# Patient Record
Sex: FEMALE | Race: WHITE | Employment: UNEMPLOYED | URBAN - METROPOLITAN AREA
[De-identification: names, ages, dates, MRNs, and addresses within clinical notes are randomized per-mention and may not be internally consistent; named-entity substitution may affect disease eponyms.]

---

## 2017-02-10 ENCOUNTER — OFFICE VISIT (OUTPATIENT)
Dept: INTERNAL MEDICINE CLINIC | Age: 37
End: 2017-02-10

## 2017-02-10 VITALS
HEART RATE: 97 BPM | BODY MASS INDEX: 39.87 KG/M2 | RESPIRATION RATE: 16 BRPM | HEIGHT: 63 IN | SYSTOLIC BLOOD PRESSURE: 118 MMHG | TEMPERATURE: 97.9 F | WEIGHT: 225 LBS | OXYGEN SATURATION: 98 % | DIASTOLIC BLOOD PRESSURE: 79 MMHG

## 2017-02-10 DIAGNOSIS — K21.9 GASTROESOPHAGEAL REFLUX DISEASE WITHOUT ESOPHAGITIS: ICD-10-CM

## 2017-02-10 DIAGNOSIS — E10.8 TYPE 1 DIABETES MELLITUS WITH COMPLICATIONS (HCC): ICD-10-CM

## 2017-02-10 DIAGNOSIS — J30.9 ALLERGIC RHINITIS, UNSPECIFIED ALLERGIC RHINITIS TRIGGER, UNSPECIFIED RHINITIS SEASONALITY: ICD-10-CM

## 2017-02-10 DIAGNOSIS — J06.9 URTI (ACUTE UPPER RESPIRATORY INFECTION): Primary | ICD-10-CM

## 2017-02-10 RX ORDER — INSULIN DEGLUDEC 200 U/ML
INJECTION, SOLUTION SUBCUTANEOUS
COMMUNITY
End: 2017-06-26 | Stop reason: ALTCHOICE

## 2017-02-10 RX ORDER — FLUTICASONE PROPIONATE 50 MCG
2 SPRAY, SUSPENSION (ML) NASAL DAILY
Qty: 1 BOTTLE | Refills: 0 | Status: SHIPPED | OUTPATIENT
Start: 2017-02-10 | End: 2018-04-10

## 2017-02-10 RX ORDER — AMOXICILLIN 875 MG/1
875 TABLET, FILM COATED ORAL 2 TIMES DAILY
Qty: 20 TAB | Refills: 0 | Status: SHIPPED | OUTPATIENT
Start: 2017-02-10 | End: 2017-06-26 | Stop reason: ALTCHOICE

## 2017-02-10 RX ORDER — PANTOPRAZOLE SODIUM 40 MG/1
TABLET, DELAYED RELEASE ORAL
Qty: 30 TAB | Refills: 5 | Status: SHIPPED | OUTPATIENT
Start: 2017-02-10 | End: 2017-11-30 | Stop reason: SDUPTHER

## 2017-02-10 NOTE — PROGRESS NOTES
Chief Complaint   Patient presents with    Ear Pain    Sinus Pain     Reviewed record  In preparation for visit and have obtained necessary documentation. 1. Have you been to the ER, urgent care clinic since your last visit? Hospitalized since your last visit? No    2. Have you seen or consulted any other health care providers outside of the [facility] since your last visit? Include any pap smears or colon screening. Dr. Renata Couch.     Used 2 patient I. D. 's

## 2017-02-10 NOTE — PROGRESS NOTES
HISTORY OF PRESENT ILLNESS  Gumaro Wilkins is a 43 y.o. female. This patient presents today with complaints of ear pain. The patient has been experiencing right ear pain x 1 week. She has had some sinus pressure and nasal congestion as well as cough. No fever or chills. No chest pain or shortness of breath. Patient's history includes Type I DM, followed by Dr. Jaden Zhang. She states she saw him last month with A1c 6.9. Patient' history also includes GERD. She requests refill of Protonix. Visit Vitals    /79    Pulse 97    Temp 97.9 °F (36.6 °C)    Resp 16    Ht 5' 3\" (1.6 m)    Wt 225 lb (102.1 kg)    SpO2 98%    Breastfeeding Yes    BMI 39.86 kg/m2     HPI    Review of Systems   Constitutional: Negative for chills, fever and malaise/fatigue. HENT: Positive for congestion, ear pain and sore throat. Eyes: Negative for blurred vision. Respiratory: Positive for cough. Negative for shortness of breath and wheezing. Cardiovascular: Negative for chest pain and leg swelling. Musculoskeletal: Negative. Skin: Negative. Neurological: Positive for headaches. Negative for dizziness, tingling and tremors. Endo/Heme/Allergies: Negative. Psychiatric/Behavioral: Negative. Physical Exam   Constitutional: She is oriented to person, place, and time. She appears well-developed and well-nourished. No distress. HENT:   Head: Normocephalic and atraumatic. Right Ear: External ear normal.   Left Ear: External ear normal.   Mouth/Throat: No oropharyngeal exudate. Moderate nasal congestion  Frontal sinus tenderness   Eyes: Conjunctivae are normal.   Neck: Neck supple. Cardiovascular: Normal rate and regular rhythm. No murmur heard. Pulmonary/Chest: Effort normal and breath sounds normal. No respiratory distress. She has no wheezes. She has no rales. Abdominal: Soft. She exhibits no distension. Musculoskeletal: She exhibits no edema.    Lymphadenopathy:     She has no cervical adenopathy. Neurological: She is alert and oriented to person, place, and time. Skin: Skin is warm and dry. Psychiatric: She has a normal mood and affect. Her behavior is normal.       ASSESSMENT and PLAN    ICD-10-CM ICD-9-CM    1. URTI (acute upper respiratory infection) J06.9 465.9 Will order  amoxicillin (AMOXIL) 875 mg tablet, 1 tab po bid x 10 days. Patient encouraged to rest and drink plenty of fluids. 2. Allergic rhinitis, unspecified allergic rhinitis trigger, unspecified rhinitis seasonality J30.9 477.9 Will refill:  fluticasone (FLONASE) 50 mcg/actuation nasal spray, 2 sprays to each nostril daily PRN   3. 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 Brittney Joyner   4. Gastroesophageal reflux disease without esophagitis K21.9 530.81 Will refill:  pantoprazole (PROTONIX) 40 mg tablet, 1 tab po daily. Lab results and schedule of future lab studies reviewed with patient  Reviewed diet, exercise and weight control  Reviewed medications and side effects in detail  Patient encouraged to call or return to office if symptoms do not improve or worsen. Reviewed plan of care with patient who acknowledges understanding and agrees.

## 2017-02-10 NOTE — MR AVS SNAPSHOT
Visit Information Date & Time Provider Department Dept. Phone Encounter #  
 2/10/2017  9:45 AM Stiven Zelaya, [address] [facility] Internal Medicine [phone] [id number] Upcoming Health Maintenance Date Due  
 LIPID PANEL Q1 1978 FOOT EXAM Q1 9/29/1988 EYE EXAM RETINAL OR DILATED Q1 9/29/1988 Pneumococcal 19-64 Medium Risk (1 of 1 - PPSV23) 9/29/1997 DTaP/Tdap/Td series (1 - Tdap) 9/29/1999 PAP AKA CERVICAL CYTOLOGY 9/29/1999 HEMOGLOBIN A1C Q6M 5/26/2012 MICROALBUMIN Q1 10/9/2014 Allergies as of 2/10/2017  Review Complete On: 2/10/2017 By: Stiven Zelaya NP Severity Noted Reaction Type Reactions Aspirin  12/12/2010    Itching Orange  11/26/2011    Swelling Sulfa (Sulfonamide Antibiotics)  12/12/2010    Nausea and Vomiting Sudafed [Pseudoephedrine Hcl] Low 11/27/2011   Intolerance Other (comments) Severe head ache Current Immunizations  Never Reviewed No immunizations on file. Not reviewed this visit Vitals BP Pulse Temp Resp Height(growth percentile) Weight(growth percentile) 118/79 97 97.9 °F (36.6 °C) 16 5' 3\" (1.6 m) 225 lb (102.1 kg) LMP SpO2 Breastfeeding? BMI OB Status Smoking Status 01/30/2017 (Approximate) 98% Yes 39.86 kg/m2 Having regular periods Never Smoker Vitals History BMI and BSA Data Body Mass Index Body Surface Area  
 39.86 kg/m 2 2.13 m 2 Preferred Pharmacy Pharmacy Name Phone [facility] [address], [address] [phone] Your Updated Medication List  
  
   
This list is accurate as of: 2/10/17 10:30 AM.  Always use your most recent med list.  
  
  
  
  
 albuterol 90 mcg/actuation inhaler Commonly known as:  PROVENTIL HFA, VENTOLIN HFA, PROAIR HFA Take 2 puffs by inhalation every four (4) hours as needed for Wheezing. amoxicillin 875 mg tablet Commonly known as:  AMOXIL Take 1 Tab by mouth two (2) times a day. fluticasone 50 mcg/actuation nasal spray Commonly known as:  Dante Metro 2 Sprays by Both Nostrils route daily. HUMALOG PEN SC  
by SubCUTAneous route. pantoprazole 40 mg tablet Commonly known as:  PROTONIX  
TAKE ONE TABLET BY MOUTH EVERY DAY PRENATAL MULTIVIT WITH IRON PO Take  by mouth. TRESIBA FLEXTOUCH U-200 200 unit/mL (3 mL) Inpn Generic drug:  insulin degludec  
by SubCUTAneous route. Prescriptions Sent to Pharmacy Refills  
 fluticasone (FLONASE) 50 mcg/actuation nasal spray 0 Si Sprays by Both Nostrils route daily. Class: Normal  
 Pharmacy: [address] Ph #: [phone] Route: Both Nostrils  
 amoxicillin (AMOXIL) 875 mg tablet 0 Sig: Take 1 Tab by mouth two (2) times a day. Class: Normal  
 Pharmacy: [address] Ph #: [phone] Route: Oral  
 pantoprazole (PROTONIX) 40 mg tablet 5 Sig: TAKE ONE TABLET BY MOUTH EVERY DAY Class: Normal  
 Pharmacy: [address] Ph #: [phone] Please provide this summary of care documentation to your next provider. Your primary care clinician is listed as Yenni Winchester. If you have any questions after today's visit, please call [phone].

## 2017-03-10 RX ORDER — ALBUTEROL SULFATE 90 UG/1
2 AEROSOL, METERED RESPIRATORY (INHALATION)
Qty: 1 INHALER | Refills: 1 | Status: SHIPPED | OUTPATIENT
Start: 2017-03-10 | End: 2018-04-10 | Stop reason: SDUPTHER

## 2017-06-26 ENCOUNTER — OFFICE VISIT (OUTPATIENT)
Dept: INTERNAL MEDICINE CLINIC | Age: 37
End: 2017-06-26

## 2017-06-26 VITALS
OXYGEN SATURATION: 97 % | WEIGHT: 230 LBS | RESPIRATION RATE: 14 BRPM | DIASTOLIC BLOOD PRESSURE: 81 MMHG | TEMPERATURE: 96.7 F | HEIGHT: 62 IN | SYSTOLIC BLOOD PRESSURE: 134 MMHG | HEART RATE: 98 BPM | BODY MASS INDEX: 42.33 KG/M2

## 2017-06-26 DIAGNOSIS — J02.9 PHARYNGITIS, UNSPECIFIED ETIOLOGY: ICD-10-CM

## 2017-06-26 DIAGNOSIS — E10.8 TYPE 1 DIABETES MELLITUS WITH COMPLICATIONS (HCC): ICD-10-CM

## 2017-06-26 DIAGNOSIS — J01.00 ACUTE MAXILLARY SINUSITIS, RECURRENCE NOT SPECIFIED: Primary | ICD-10-CM

## 2017-06-26 RX ORDER — AMOXICILLIN AND CLAVULANATE POTASSIUM 875; 125 MG/1; MG/1
1 TABLET, FILM COATED ORAL EVERY 12 HOURS
Qty: 20 TAB | Refills: 0 | Status: SHIPPED | OUTPATIENT
Start: 2017-06-26 | End: 2017-10-06 | Stop reason: ALTCHOICE

## 2017-06-26 RX ORDER — INSULIN GLARGINE 100 [IU]/ML
41 INJECTION, SOLUTION SUBCUTANEOUS
COMMUNITY
End: 2017-10-06

## 2017-06-26 NOTE — PROGRESS NOTES
Subjective:     Chief Complaint   Patient presents with    Sore Throat       History of Present Illness    Katherine Schwartz is a 43y.o. year old female who presents for evaluation of sore throat and sinus pain. Pt states her symptoms began 1 week ago. She is also complaining of a dry cough. She states she has friends with children who have been recently diagnosed with strep throat. She denies any fever, SOB or chest pain. She has a hx of diabetes and states that her blood sugar has been slightly elevated since she started not feeling well. Pt states she is compliant with her medication. Current Outpatient Prescriptions on File Prior to Visit   Medication Sig Dispense Refill    albuterol (PROVENTIL HFA, VENTOLIN HFA, PROAIR HFA) 90 mcg/actuation inhaler Take 2 Puffs by inhalation every four (4) hours as needed for Wheezing. 1 Inhaler 1    pantoprazole (PROTONIX) 40 mg tablet TAKE ONE TABLET BY MOUTH EVERY DAY 30 Tab 5    INSULIN LISPRO (HUMALOG PEN SC) by SubCUTAneous route.  PNV #29/[id number] FUM/FOLIC ACID (PRENATAL MULTIVIT WITH IRON PO) Take  by mouth.  fluticasone (FLONASE) 50 mcg/actuation nasal spray 2 Sprays by Both Nostrils route daily. 1 Bottle 0     No current facility-administered medications on file prior to visit.         Allergies   Allergen Reactions    Aspirin Itching    Orange Swelling    Sulfa (Sulfonamide Antibiotics) Nausea and Vomiting    Sudafed [Pseudoephedrine Hcl] Other (comments)     Severe head ache      Past Medical History:   Diagnosis Date    Diabetes ([address]     Gastrointestinal disorder     GERD    GERD (gastroesophageal reflux disease)     Thyroid disease       Past Surgical History:   Procedure Laterality Date    HX  SECTION      HX CHOLECYSTECTOMY      HX HEENT      eye surgery    HX LIPOMA RESECTION      HX ORTHOPAEDIC      Carpul jesus manuel    HX OTHER SURGICAL      lipoma removal      Social History   Substance Use Topics    Smoking status: Never Smoker    Smokeless tobacco: Never Used    Alcohol use No      Family History   Problem Relation Age of Onset    Thyroid Disease Mother     Heart Disease Father     Cancer Father 61     Colon cancer    Alcohol abuse Father     Cancer Paternal Aunt      Breast cancer    Arthritis-rheumatoid Paternal Aunt     Cancer Paternal Aunt      breast cancer        Objective:     Vitals:    06/26/17 1355   BP: 134/81   Pulse: 98   Resp: 14   Temp: 96.7 °F (35.9 °C)   TempSrc: Oral   SpO2: 97%   Weight: 230 lb (104.3 kg)   Height: 5\"2'       Review of Systems   Constitutional: Negative. HENT:        Sore throat and facial pain   Eyes: Negative. Respiratory: Negative. Cardiovascular: Negative. Gastrointestinal: Negative. Genitourinary: Negative. Musculoskeletal: Negative. Skin: Negative. Neurological: Negative. Psychiatric/Behavioral: Negative. Physical Exam   Constitutional: She is oriented to person, place, and time. She appears well-developed and well-nourished. No distress. HENT:   Head: Normocephalic and atraumatic. Right Ear: Tympanic membrane normal.   Left Ear: Tympanic membrane normal.   Nose: Mucosal edema present. Right sinus exhibits maxillary sinus tenderness. Right sinus exhibits no frontal sinus tenderness. Left sinus exhibits maxillary sinus tenderness. Left sinus exhibits no frontal sinus tenderness. Mouth/Throat: Uvula is midline. Posterior oropharyngeal erythema present. Eyes: Pupils are equal, round, and reactive to light. Neck: Normal range of motion. Neck supple. Cardiovascular: Normal rate, regular rhythm and normal heart sounds. Pulmonary/Chest: Effort normal and breath sounds normal. No respiratory distress. Abdominal: She exhibits no distension. Musculoskeletal: Normal range of motion. She exhibits no edema. Neurological: She is alert and oriented to person, place, and time. Skin: Skin is warm. She is not diaphoretic. Psychiatric: She has a normal mood and affect. Her behavior is normal.   Nursing note and vitals reviewed. Assessment/ Plan:   Yandel Wall was seen today for sore throat. Diagnoses and all orders for this visit:    Acute maxillary sinusitis, recurrence not specified   Will order  -     amoxicillin-clavulanate (AUGMENTIN) 875-125 mg per tablet; Take 1 Tab by mouth every twelve (12) hours. 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 Patient is followed by endocrinology, Dr. Ulysses Grice. Dr. Ulysses Grice recently switched patient from [place] back to Lantus 41 units q HS. Reviewed plan of care with patient. Patient verbalizes understanding. Patient without questions or concerns at this time. Patient encouraged to call or return to office if symptoms do not improve or worsen.

## 2017-06-26 NOTE — MR AVS SNAPSHOT
Visit Information Date & Time Provider Department Dept. Phone Encounter #  
 6/26/2017  1:45 PM Gabriel Pleitez, [address] [facility] Internal Medicine [phone] [id number] Follow-up Instructions Return if symptoms worsen or fail to improve. Upcoming Health Maintenance Date Due  
 LIPID PANEL Q1 1978 FOOT EXAM Q1 9/29/1988 EYE EXAM RETINAL OR DILATED Q1 9/29/1988 Pneumococcal 19-64 Medium Risk (1 of 1 - PPSV23) 9/29/1997 DTaP/Tdap/Td series (1 - Tdap) 9/29/1999 PAP AKA CERVICAL CYTOLOGY 9/29/1999 HEMOGLOBIN A1C Q6M 5/26/2012 MICROALBUMIN Q1 10/9/2014 INFLUENZA AGE 9 TO ADULT 8/1/2017 Allergies as of 6/26/2017  Review Complete On: 6/26/2017 By: Gabriel Pleitez NP Severity Noted Reaction Type Reactions Aspirin  12/12/2010    Itching Orange  11/26/2011    Swelling Sulfa (Sulfonamide Antibiotics)  12/12/2010    Nausea and Vomiting Sudafed [Pseudoephedrine Hcl] Low 11/27/2011   Intolerance Other (comments) Severe head ache Current Immunizations  Never Reviewed No immunizations on file. Not reviewed this visit You Were Diagnosed With   
  
 Codes Comments Acute maxillary sinusitis, recurrence not specified    -  Primary ICD-10-CM: J01.00 ICD-9-CM: 461.0 Vitals BP Pulse Temp Resp Height(growth percentile) Weight(growth percentile) 134/81 (BP 1 Location: Right arm, BP Patient Position: Sitting) 98 96.7 °F (35.9 °C) (Oral) 14 (!) 5.2\" (0.132 m) 230 lb (104.3 kg) SpO2 BMI OB Status Smoking Status 97% 5,980.31 kg/m2 Having regular periods Never Smoker BMI and BSA Data Body Mass Index Body Surface Area  
 5,980.31 kg/m 2 0.62 m 2 Preferred Pharmacy Pharmacy Name Phone [facility] [phone] Your Updated Medication List  
  
   
 This list is accurate as of: 6/26/17  2:04 PM.  Always use your most recent med list.  
  
  
  
  
 albuterol 90 mcg/actuation inhaler Commonly known as:  PROVENTIL HFA, VENTOLIN HFA, PROAIR HFA Take 2 Puffs by inhalation every four (4) hours as needed for Wheezing. amoxicillin-clavulanate 875-125 mg per tablet Commonly known as:  AUGMENTIN Take 1 Tab by mouth every twelve (12) hours. fluticasone 50 mcg/actuation nasal spray Commonly known as:  Rosalie Smack 2 Sprays by Both Nostrils route daily. HUMALOG PEN SC  
by SubCUTAneous route. LANTUS 100 unit/mL injection Generic drug:  insulin glargine 41 Units by SubCUTAneous route nightly. pantoprazole 40 mg tablet Commonly known as:  PROTONIX  
TAKE ONE TABLET BY MOUTH EVERY DAY PRENATAL MULTIVIT WITH IRON PO Take  by mouth. Prescriptions Sent to Pharmacy Refills  
 amoxicillin-clavulanate (AUGMENTIN) 875-125 mg per tablet 0 Sig: Take 1 Tab by mouth every twelve (12) hours. Class: Normal  
 [facility] [address] [facility] [phone] Route: Oral  
  
Follow-up Instructions Return if symptoms worsen or fail to improve. Please provide this summary of care documentation to your next provider. Your primary care clinician is listed as Ayo Scott. If you have any questions after today's visit, please call [phone].

## 2017-06-26 NOTE — PATIENT INSTRUCTIONS
Sinusitis: Care Instructions  Your Care Instructions    Sinusitis is an infection of the lining of the sinus cavities in your head. Sinusitis often follows a cold. It causes pain and pressure in your head and face. In most cases, sinusitis gets better on its own in 1 to 2 weeks. But some mild symptoms may last for several weeks. Sometimes antibiotics are needed. Follow-up care is a key part of your treatment and safety. Be sure to make and go to all appointments, and call your doctor if you are having problems. It's also a good idea to know your test results and keep a list of the medicines you take. How can you care for yourself at home? · Take an over-the-counter pain medicine, such as acetaminophen (Tylenol), ibuprofen (Advil, Motrin), or naproxen (Aleve). Read and follow all instructions on the label. · If the doctor prescribed antibiotics, take them as directed. Do not stop taking them just because you feel better. You need to take the full course of antibiotics. · Be careful when taking over-the-counter cold or flu medicines and Tylenol at the same time. Many of these medicines have acetaminophen, which is Tylenol. Read the labels to make sure that you are not taking more than the recommended dose. Too much acetaminophen (Tylenol) can be harmful. · Breathe warm, moist air from a steamy shower, a hot bath, or a sink filled with hot water. Avoid cold, dry air. Using a humidifier in your home may help. Follow the directions for cleaning the machine. · Use saline (saltwater) nasal washes to help keep your nasal passages open and wash out mucus and bacteria. You can buy saline nose drops at a grocery store or drugstore. Or you can make your own at home by adding 1 teaspoon of salt and 1 teaspoon of baking soda to 2 cups of distilled water. If you make your own, fill a bulb syringe with the solution, insert the tip into your nostril, and squeeze gently. Valerio Gaw your nose.   · Put a hot, wet towel or a warm gel pack on your face 3 or 4 times a day for 5 to 10 minutes each time. · Try a decongestant nasal spray like oxymetazoline (Afrin). Do not use it for more than 3 days in a row. Using it for more than 3 days can make your congestion worse. When should you call for help? Call your doctor now or seek immediate medical care if:  · You have new or worse swelling or redness in your face or around your eyes. · You have a new or higher fever. Watch closely for changes in your health, and be sure to contact your doctor if:  · You have new or worse facial pain. · The mucus from your nose becomes thicker (like pus) or has new blood in it. · You are not getting better as expected. Where can you learn more? Go to http://shira-obed.info/. Enter R052 in the search box to learn more about \"Sinusitis: Care Instructions. \"  Current as of: July 29, 2016  Content Version: 11.3  © 2241-6353 [employer]. Care instructions adapted under license by [employer] (which disclaims liability or warranty for this information). If you have questions about a medical condition or this instruction, always ask your healthcare professional. [place] [address] any warranty or liability for your use of this information. Sore Throat: Care Instructions  Your Care Instructions    Infection by bacteria or a virus causes most sore throats. Cigarette smoke, dry air, air pollution, allergies, and yelling can also cause a sore throat. Sore throats can be painful and annoying. Fortunately, most sore throats go away on their own. If you have a bacterial infection, your doctor may prescribe antibiotics. Follow-up care is a key part of your treatment and safety. Be sure to make and go to all appointments, and call your doctor if you are having problems. It's also a good idea to know your test results and keep a list of the medicines you take. How can you care for yourself at home?   · If your doctor prescribed antibiotics, take them as directed. Do not stop taking them just because you feel better. You need to take the full course of antibiotics. · Gargle with warm salt water once an hour to help reduce swelling and relieve discomfort. Use 1 teaspoon of salt mixed in 1 cup of warm water. · Take an over-the-counter pain medicine, such as acetaminophen (Tylenol), ibuprofen (Advil, Motrin), or naproxen (Aleve). Read and follow all instructions on the label. · Be careful when taking over-the-counter cold or flu medicines and Tylenol at the same time. Many of these medicines have acetaminophen, which is Tylenol. Read the labels to make sure that you are not taking more than the recommended dose. Too much acetaminophen (Tylenol) can be harmful. · Drink plenty of fluids. Fluids may help soothe an irritated throat. Hot fluids, such as tea or soup, may help decrease throat pain. · Use over-the-counter throat lozenges to soothe pain. Regular cough drops or hard candy may also help. These should not be given to young children because of the risk of choking. · Do not smoke or allow others to smoke around you. If you need help quitting, talk to your doctor about stop-smoking programs and medicines. These can increase your chances of quitting for good. · Use a vaporizer or humidifier to add moisture to your bedroom. Follow the directions for cleaning the machine. When should you call for help? Call your doctor now or seek immediate medical care if:  · You have new or worse trouble swallowing. · Your sore throat gets much worse on one side. Watch closely for changes in your health, and be sure to contact your doctor if you do not get better as expected. Where can you learn more? Go to http://shira-obed.info/. Enter 062 441 [id number] [phone] in the search box to learn more about \"Sore Throat: Care Instructions. \"  Current as of: July 29, 2016  Content Version: 11.3  © 5751-1479 [employer]. Care instructions adapted under license by [employer] (which disclaims liability or warranty for this information). If you have questions about a medical condition or this instruction, always ask your healthcare professional. [address] any warranty or liability for your use of this information.

## 2017-10-06 ENCOUNTER — OFFICE VISIT (OUTPATIENT)
Dept: INTERNAL MEDICINE CLINIC | Age: 37
End: 2017-10-06

## 2017-10-06 VITALS
OXYGEN SATURATION: 95 % | BODY MASS INDEX: 41.41 KG/M2 | HEIGHT: 62 IN | SYSTOLIC BLOOD PRESSURE: 110 MMHG | HEART RATE: 88 BPM | WEIGHT: 225 LBS | TEMPERATURE: 98.2 F | DIASTOLIC BLOOD PRESSURE: 70 MMHG | RESPIRATION RATE: 18 BRPM

## 2017-10-06 DIAGNOSIS — L25.9 CONTACT DERMATITIS, UNSPECIFIED CONTACT DERMATITIS TYPE, UNSPECIFIED TRIGGER: Primary | ICD-10-CM

## 2017-10-06 DIAGNOSIS — E10.8 TYPE 1 DIABETES MELLITUS WITH COMPLICATIONS (HCC): ICD-10-CM

## 2017-10-06 DIAGNOSIS — M72.2 PLANTAR FASCIITIS OF RIGHT FOOT: ICD-10-CM

## 2017-10-06 DIAGNOSIS — E66.9 OBESITY (BMI 30-39.9): ICD-10-CM

## 2017-10-06 RX ORDER — TRIAMCINOLONE ACETONIDE 5 MG/G
OINTMENT TOPICAL 2 TIMES DAILY
Qty: 30 G | Refills: 0 | Status: SHIPPED | OUTPATIENT
Start: 2017-10-06 | End: 2018-06-27

## 2017-10-06 RX ORDER — IBUPROFEN 600 MG/1
600 TABLET ORAL
Qty: 30 TAB | Refills: 0 | Status: SHIPPED | OUTPATIENT
Start: 2017-10-06 | End: 2022-07-28

## 2017-10-06 RX ORDER — BLOOD SUGAR DIAGNOSTIC
STRIP MISCELLANEOUS
Refills: 4 | COMMUNITY
Start: 2017-10-02

## 2017-10-06 RX ORDER — INSULIN GLARGINE 300 U/ML
46 INJECTION, SOLUTION SUBCUTANEOUS
COMMUNITY
Start: 2017-10-05

## 2017-10-06 RX ORDER — INSULIN LISPRO 100 [IU]/ML
INJECTION, SOLUTION INTRAVENOUS; SUBCUTANEOUS
Refills: 1 | COMMUNITY
Start: 2017-10-02 | End: 2018-04-10

## 2017-10-06 NOTE — MR AVS SNAPSHOT
Visit Information Date & Time Provider Department Dept. Phone Encounter #  
 10/6/2017 10:40 AM Yu Rice NP [facility] Internal Medicine [phone] [id number] Upcoming Health Maintenance Date Due  
 LIPID PANEL Q1 1978 FOOT EXAM Q1 9/29/1988 EYE EXAM RETINAL OR DILATED Q1 9/29/1988 Pneumococcal 19-64 Medium Risk (1 of 1 - PPSV23) 9/29/1997 DTaP/Tdap/Td series (1 - Tdap) 9/29/1999 PAP AKA CERVICAL CYTOLOGY 9/29/1999 HEMOGLOBIN A1C Q6M 5/26/2012 MICROALBUMIN Q1 10/9/2014 INFLUENZA AGE 9 TO ADULT 8/1/2017 Allergies as of 10/6/2017  Review Complete On: 10/6/2017 By: Yu Rice NP Severity Noted Reaction Type Reactions Aspirin  12/12/2010    Itching Orange  11/26/2011    Swelling Sulfa (Sulfonamide Antibiotics)  12/12/2010    Nausea and Vomiting Sudafed [Pseudoephedrine Hcl] Low 11/27/2011   Intolerance Other (comments) Severe head ache Current Immunizations  Never Reviewed No immunizations on file. Not reviewed this visit You Were Diagnosed With   
  
 Codes Comments Contact dermatitis, unspecified contact dermatitis type, unspecified trigger    -  Primary ICD-10-CM: L25.9 ICD-9-CM: 692.9 Vitals BP Pulse Temp Resp Height(growth percentile) Weight(growth percentile) 110/70 (BP 1 Location: Left arm, BP Patient Position: Sitting) 88 98.2 °F (36.8 °C) (Oral) 18 5' 2\" (1.575 m) 225 lb (102.1 kg) LMP SpO2 BMI OB Status Smoking Status 09/23/2017 (Exact Date) 95% 41.15 kg/m2 Having regular periods Never Smoker Vitals History BMI and BSA Data Body Mass Index Body Surface Area  
 41.15 kg/m 2 2.11 m 2 Preferred Pharmacy Pharmacy Name Phone [phone] Your Updated Medication List  
  
   
This list is accurate as of: 10/6/17 11:17 AM.  Always use your most recent med list.  
  
  
  
  
 albuterol 90 mcg/actuation inhaler Commonly known as:  PROVENTIL HFA, VENTOLIN HFA, PROAIR HFA Take 2 Puffs by inhalation every four (4) hours as needed for Wheezing. fluticasone 50 mcg/actuation nasal spray Commonly known as:  Jeannette Guild 2 Sprays by Both Nostrils route daily. * HUMALOG PEN SC  
by SubCUTAneous route. * HumaLOG KwikPen 100 unit/mL kwikpen Generic drug:  insulin lispro INJECT 30 UNITS UNDER THE SKIN TID AT MEALS AS INSTRUCTED NOVOFINE 30 30 gauge x 1/3\" Generic drug:  Insulin Needles (Disposable) USE TO INJECT INSULIN TID ONETOUCH VERIO strip Generic drug:  glucose blood VI test strips U TO TEST BLOOD SUGAR 8 TIMES D  
  
 pantoprazole 40 mg tablet Commonly known as:  PROTONIX  
TAKE ONE TABLET BY MOUTH EVERY DAY PRENATAL MULTIVIT WITH IRON PO Take  by mouth. TOUJEO SOLOSTAR 300 unit/mL (1.5 mL) Inpn Generic drug:  insulin glargine  
  
 triamcinolone acetonide 0.5 % ointment Commonly known as:  KENALOG Apply  to affected area two (2) times a day. use thin layer * Notice: This list has 2 medication(s) that are the same as other medications prescribed for you. Read the directions carefully, and ask your doctor or other care provider to review them with you. Prescriptions Sent to Pharmacy Refills  
 triamcinolone acetonide (KENALOG) 0.5 % ointment 0 Sig: Apply  to affected area two (2) times a day. use thin layer Class: Normal  
 Pharmacy: [employer] [facility] [address]  #: [phone] Route: Topical  
  
 Please provide this summary of care documentation to your next provider. Your primary care clinician is listed as Cadence Ortiz. If you have any questions after today's visit, please call [phone].

## 2017-10-06 NOTE — PROGRESS NOTES
Health Maintenance Due   Topic Date Due    LIPID PANEL Q1  1978    FOOT EXAM Q1  09/29/1988    EYE EXAM RETINAL OR DILATED Q1  09/29/1988    Pneumococcal 19-64 Medium Risk (1 of 1 - PPSV23) 09/29/1997    DTaP/Tdap/Td series (1 - Tdap) 09/29/1999    PAP AKA CERVICAL CYTOLOGY  09/29/1999    HEMOGLOBIN A1C Q6M  05/26/2012    MICROALBUMIN Q1  10/09/2014    INFLUENZA AGE 9 TO ADULT  08/01/2017       No chief complaint on file. 1. Have you been to the ER, urgent care clinic since your last visit? Hospitalized since your last visit? Yes When: 8/19/17 Where: [facility] Reason for visit: [place]    2. Have you seen or consulted any other health care providers outside of the [facility] since your last visit? Include any pap smears or colon screening. Yes When: 10/4/17 Where: Dr. Deb Alba Reason for visit: Endocrinologist    3) Do you have an Advance Directive on file? no    4) Are you interested in receiving information on Advance Directives? NO      Patient is accompanied by self I have received verbal consent from [facility] to discuss any/all medical information while they are present in the room.

## 2017-10-06 NOTE — PROGRESS NOTES
Subjective:     Chief Complaint   Patient presents with    Rash     Red area on neck    Foot Pain     when moves    Diabetes       History of Present Illness    Nery Al is a 42y.o. year old female who is a patient of Dr. Casa Hoffmann that presents today with complaints of a small irritated area to her neck. She states it has been there for 1 month. She states it is pruritic. She has used OTC antibiotic cream and antifungal cream with no results. She also reports pain to to the bottom of her right foot that causes swelling to the top of her foot. Onset 1 week. She reports taking motrin with moderate relief. She has also applied ice to the affected area. She is ambulatory with a steady gait. She denies any other complaints today. She states her BS has been inconsistent. She reports recently changing from [facility] to [facility]. She is also breast feeding. Her other hx includes thyroid disease and GERD. No CP, SOB, GI, or  symptoms. Reviewed medications, recent lab work and imaging with patient. Pt reports compliance with medications. Current Outpatient Prescriptions on File Prior to Visit   Medication Sig Dispense Refill    albuterol (PROVENTIL HFA, VENTOLIN HFA, PROAIR HFA) 90 mcg/actuation inhaler Take 2 Puffs by inhalation every four (4) hours as needed for Wheezing. 1 Inhaler 1    fluticasone (FLONASE) 50 mcg/actuation nasal spray 2 Sprays by Both Nostrils route daily. 1 Bottle 0    pantoprazole (PROTONIX) 40 mg tablet TAKE ONE TABLET BY MOUTH EVERY DAY 30 Tab 5    INSULIN LISPRO (HUMALOG PEN SC) by SubCUTAneous route.  PNV #71/[id number] FUM/FOLIC ACID (PRENATAL MULTIVIT WITH IRON PO) Take  by mouth.  insulin glargine (LANTUS) 100 unit/mL injection 41 Units by SubCUTAneous route nightly. No current facility-administered medications on file prior to visit.         Allergies   Allergen Reactions    Aspirin Itching    Orange Swelling    Sulfa (Sulfonamide Antibiotics) Nausea and Vomiting    Sudafed [Pseudoephedrine Hcl] Other (comments)     Severe head ache      Past Medical History:   Diagnosis Date    Diabetes ([address]     Gastrointestinal disorder     GERD    GERD (gastroesophageal reflux disease)     Thyroid disease       Past Surgical History:   Procedure Laterality Date    HX  SECTION      HX CHOLECYSTECTOMY  2013    HX HEENT      eye surgery    HX LIPOMA RESECTION      HX ORTHOPAEDIC      Carpul jesus manuel    HX OTHER SURGICAL      lipoma removal      Social History   Substance Use Topics    Smoking status: Never Smoker    Smokeless tobacco: Never Used    Alcohol use No      Family History   Problem Relation Age of Onset    Thyroid Disease Mother     Heart Disease Father     Cancer Father 61     Colon cancer    Alcohol abuse Father     Cancer Paternal Aunt      Breast cancer    Arthritis-rheumatoid Paternal Aunt     Cancer Paternal Aunt      breast cancer        Objective:     Vitals:    10/06/17 1049   BP: 110/70   Pulse: 88   Resp: 18   Temp: 98.2 °F (36.8 °C)   TempSrc: Oral   SpO2: 95%   Weight: 225 lb (102.1 kg)   Height: 5' 2\" (1.575 m)       Review of Systems   Constitutional: Negative. HENT: Negative. Eyes: Negative. Respiratory: Negative. Cardiovascular: Negative. Gastrointestinal: Negative. Genitourinary: Negative. Musculoskeletal: Positive for myalgias. Skin: Positive for itching and rash. Psychiatric/Behavioral: Negative. Physical Exam   Constitutional: She is oriented to person, place, and time. She appears well-developed and well-nourished. No distress. Well-appearing obese  female. NAD    HENT:   Head: Normocephalic and atraumatic. Eyes: Conjunctivae and EOM are normal. Pupils are equal, round, and reactive to light. Neck: Normal range of motion. Neck supple. Cardiovascular: Normal rate, regular rhythm and normal heart sounds.     Pulmonary/Chest: Effort normal and breath sounds normal. No respiratory distress. Abdominal: Soft. Bowel sounds are normal. She exhibits no distension. There is no tenderness. Musculoskeletal: Normal range of motion. She exhibits tenderness. She exhibits no edema or deformity. Pain to the plantar fascia on the right foot. Neurological: She is alert and oriented to person, place, and time. Skin: Skin is warm and dry. She is not diaphoretic. Small maculopapular area to fold of neck on right side. +pruritic. Psychiatric: She has a normal mood and affect. Her behavior is normal.   Nursing note and vitals reviewed.       Assessment/ Plan:

## 2017-10-06 NOTE — PATIENT INSTRUCTIONS
Dermatitis: Care Instructions  Your Care Instructions  Dermatitis is the general name used for any rash or inflammation of the skin. Different kinds of dermatitis cause different kinds of rashes. Common causes of a rash include new medicines, plants (such as poison oak or poison ivy), heat, and stress. Certain illnesses can also cause a rash. An allergic reaction to something that touches your skin, such as latex, nickel, or poison ivy, is called contact dermatitis. Contact dermatitis may also be caused by something that irritates the skin, such as bleach, a chemical, or soap. These types of rashes cannot be spread from person to person. How long your rash will last depends on what caused it. Rashes may last a few days or months. Follow-up care is a key part of your treatment and safety. Be sure to make and go to all appointments, and call your doctor if you are having problems. It's also a good idea to know your test results and keep a list of the medicines you take. How can you care for yourself at home? · Do not scratch the rash. Cut your nails short, and file them smooth. Or wear gloves if this helps keep you from scratching. · Wash the area with water only. Pat dry. · Put cold, wet cloths on the rash to reduce itching. · Keep cool, and stay out of the sun. · Leave the rash open to the air as much as possible. · If the rash itches, use hydrocortisone cream. Follow the directions on the label. Calamine lotion may help for plant rashes. · Take an over-the-counter antihistamine, such as diphenhydramine (Benadryl) or loratadine (Claritin), to help calm the itching. Read and follow all instructions on the label. · If your doctor prescribed a cream, use it as directed. If your doctor prescribed medicine, take it exactly as directed. When should you call for help?   Call your doctor now or seek immediate medical care if:  · You have symptoms of infection, such as:  ¨ Increased pain, swelling, warmth, or redness. ¨ Red streaks leading from the area. ¨ Pus draining from the area. ¨ A fever. · You have joint pain along with the rash. Watch closely for changes in your health, and be sure to contact your doctor if:  · Your rash is changing or getting worse. · You are not getting better as expected. Where can you learn more? Go to http://abena.info/. Enter (80) [phone] in the search box to learn more about \"Dermatitis: Care Instructions. \"  Current as of: October 13, 2016  Content Version: 11.3  © 3567-6239 [employer]. Care instructions adapted under license by [employer] (which disclaims liability or warranty for this information). If you have questions about a medical condition or this instruction, always ask your healthcare professional. [employer] any warranty or liability for your use of this information. Bones of the Foot: Anatomy Sketch    Current as of: March 21, 2017  Content Version: 11.3  © 6677-3808 [employer]. Care instructions adapted under license by [employer] (which disclaims liability or warranty for this information). If you have questions about a medical condition or this instruction, always ask your healthcare professional. [address] any warranty or liability for your use of this information. Plantar Fasciitis: Care Instructions  Your Care Instructions    Plantar fasciitis is pain and inflammation of the plantar fascia, the tissue at the bottom of your foot that connects the heel bone to the toes. The plantar fascia also supports the arch. If you strain the plantar fascia, it can develop small tears and cause heel pain when you stand or walk. Plantar fasciitis can be caused by running or other sports. It also may occur in people who are overweight or who have high arches or flat feet.  You may get plantar fasciitis if you walk or stand for long periods, or have a tight Achilles tendon or calf muscles. You can improve your foot pain with rest and other care at home. It might take a few weeks to a few months for your foot to heal completely. Follow-up care is a key part of your treatment and safety. Be sure to make and go to all appointments, and call your doctor if you are having problems. It's also a good idea to know your test results and keep a list of the medicines you take. How can you care for yourself at home? · Rest your feet often. Reduce your activity to a level that lets you avoid pain. If possible, do not run or walk on hard surfaces. · Take pain medicines exactly as directed. ¨ If the doctor gave you a prescription medicine for pain, take it as prescribed. ¨ If you are not taking a prescription pain medicine, take an over-the-counter anti-inflammatory medicine for pain and swelling, such as ibuprofen (Advil, Motrin) or naproxen (Aleve). Read and follow all instructions on the label. · Use ice massage to help with pain and swelling. You can use an ice cube or an ice cup several times a day. To make an ice cup, fill a paper cup with water and freeze it. Cut off the top of the cup until a half-inch of ice shows. Hold onto the remaining paper to use the cup. Rub the ice in small circles over the area for 5 to 7 minutes. · Contrast baths, which alternate hot and cold water, can also help reduce swelling. But because heat alone may make pain and swelling worse, end a contrast bath with a soak in cold water. · Wear a night splint if your doctor suggests it. A night splint holds your foot with the toes pointed up and the foot and ankle at a 90-degree angle. This position gives the bottom of your foot a constant, gentle stretch. · Do simple exercises such as calf stretches and towel stretches 2 to 3 times each day, especially when you first get up in the morning. These can help the plantar fascia become more flexible.  They also make the muscles that support your arch stronger. Hold these stretches for 15 to 30 seconds per stretch. Repeat 2 to 4 times. ¨ Stand about 1 foot from a wall. Place the palms of both hands against the wall at chest level. Lean forward against the wall, keeping one leg with the knee straight and heel on the ground while bending the knee of the other leg. ¨ Sit down on the floor or a mat with your feet stretched in front of you. Roll up a towel lengthwise, and loop it over the ball of your foot. Holding the towel at both ends, gently pull the towel toward you to stretch your foot. · Wear shoes with good arch support. Athletic shoes or shoes with a well-cushioned sole are good choices. · Try heel cups or shoe inserts (orthotics) to help cushion your heel. You can buy these at many shoe stores. · Put on your shoes as soon as you get out of bed. Going barefoot or wearing slippers may make your pain worse. · Reach and stay at a good weight for your height. This puts less strain on your feet. When should you call for help? Call your doctor now or seek immediate medical care if:  · You have heel pain with fever, redness, or warmth in your heel. · You cannot put weight on the sore foot. Watch closely for changes in your health, and be sure to contact your doctor if:  · You have numbness or tingling in your heel. · Your heel pain lasts more than 2 weeks. Where can you learn more? Go to http://shira-obed.info/. Caro Lie in the search box to learn more about \"Plantar Fasciitis: Care Instructions. \"  Current as of: March 21, 2017  Content Version: 11.3  © 8378-3979 [employer]. Care instructions adapted under license by [employer] (which disclaims liability or warranty for this information). If you have questions about a medical condition or this instruction, always ask your healthcare professional. [address] any warranty or liability for your use of this information.

## 2018-02-21 ENCOUNTER — OFFICE VISIT (OUTPATIENT)
Dept: INTERNAL MEDICINE CLINIC | Age: 38
End: 2018-02-21

## 2018-02-21 VITALS
HEART RATE: 87 BPM | RESPIRATION RATE: 18 BRPM | TEMPERATURE: 97.4 F | DIASTOLIC BLOOD PRESSURE: 77 MMHG | HEIGHT: 62 IN | SYSTOLIC BLOOD PRESSURE: 142 MMHG | BODY MASS INDEX: 41.66 KG/M2 | OXYGEN SATURATION: 97 % | WEIGHT: 226.4 LBS

## 2018-02-21 DIAGNOSIS — L03.011 PARONYCHIA OF RIGHT MIDDLE FINGER: Primary | ICD-10-CM

## 2018-02-21 DIAGNOSIS — K21.9 GASTROESOPHAGEAL REFLUX DISEASE WITHOUT ESOPHAGITIS: ICD-10-CM

## 2018-02-21 PROBLEM — E66.01 OBESITY, MORBID (HCC): Status: ACTIVE | Noted: 2018-02-21

## 2018-02-21 RX ORDER — PANTOPRAZOLE SODIUM 40 MG/1
TABLET, DELAYED RELEASE ORAL
Qty: 30 TAB | Refills: 3 | Status: SHIPPED | OUTPATIENT
Start: 2018-02-21 | End: 2018-09-19 | Stop reason: SDUPTHER

## 2018-02-21 RX ORDER — CEPHALEXIN 500 MG/1
500 CAPSULE ORAL 3 TIMES DAILY
Qty: 21 CAP | Refills: 0 | Status: SHIPPED | OUTPATIENT
Start: 2018-02-21 | End: 2018-02-28

## 2018-02-21 NOTE — PROGRESS NOTES
Health Maintenance Due   Topic Date Due    LIPID PANEL Q1  1978    FOOT EXAM Q1  09/29/1988    EYE EXAM RETINAL OR DILATED Q1  09/29/1988    Pneumococcal 19-64 Medium Risk (1 of 1 - PPSV23) 09/29/1997    DTaP/Tdap/Td series (1 - Tdap) 09/29/1999    PAP AKA CERVICAL CYTOLOGY  09/29/1999    HEMOGLOBIN A1C Q6M  05/26/2012    MICROALBUMIN Q1  10/09/2014    Influenza Age 9 to Adult  08/01/2017       Chief Complaint   Patient presents with    Hand Injury     wound       1. Have you been to the ER, urgent care clinic since your last visit? Hospitalized since your last visit? No    2. Have you seen or consulted any other health care providers outside of the [address] [place] [address] since your last visit? Include any pap smears or colon screening. Yes.  dorothea  3) Do you have an Advance Directive on file? no    4) Are you interested in receiving information on Advance Directives? NO      Patient is accompanied by self I have received verbal consent from Hussein Ba to discuss any/all medical information while they are present in the room.

## 2018-02-21 NOTE — MR AVS SNAPSHOT
[facility] [facility] [address] [facility] 
[phone] Patient: Serene Loja MRN: [id number] Visit Information Date & Time Provider Department Dept. Phone Encounter #  
 2/21/2018 11:00 AM Thu Whitlock NP [facility] Internal Medicine [phone] [id number] Upcoming Health Maintenance Date Due  
 LIPID PANEL Q1 1978 FOOT EXAM Q1 9/29/1988 EYE EXAM RETINAL OR DILATED Q1 9/29/1988 Pneumococcal 19-64 Medium Risk (1 of 1 - PPSV23) 9/29/1997 DTaP/Tdap/Td series (1 - Tdap) 9/29/1999 PAP AKA CERVICAL CYTOLOGY 9/29/1999 HEMOGLOBIN A1C Q6M 5/26/2012 MICROALBUMIN Q1 10/9/2014 Influenza Age 5 to Adult 8/1/2017 Allergies as of 2/21/2018  Review Complete On: 2/21/2018 By: Thu Whitlock NP Severity Noted Reaction Type Reactions Aspirin  12/12/2010    Itching Orange  11/26/2011    Swelling Sulfa (Sulfonamide Antibiotics)  12/12/2010    Nausea and Vomiting Sudafed [Pseudoephedrine Hcl] Low 11/27/2011   Intolerance Other (comments) Severe head ache Current Immunizations  Never Reviewed No immunizations on file. Not reviewed this visit You Were Diagnosed With   
  
 Codes Comments Paronychia of right middle finger    -  Primary ICD-10-CM: L03.011 
ICD-9-CM: 681.02 Gastroesophageal reflux disease without esophagitis     ICD-10-CM: K21.9 ICD-9-CM: 530.81 Vitals BP Pulse Temp Resp Height(growth percentile) Weight(growth percentile) 142/77 (BP 1 Location: Right arm, BP Patient Position: Sitting) 87 97.4 °F (36.3 °C) (Oral) 18 5' 2\" (1.575 m) 226 lb 6.4 oz (102.7 kg) LMP SpO2 BMI OB Status Smoking Status 02/14/2018 97% 41.41 kg/m2 Having regular periods Never Smoker Vitals History BMI and BSA Data Body Mass Index Body Surface Area  
 41.41 kg/m 2 2.12 m 2 Preferred Pharmacy Pharmacy Name Phone [facility] [phone] Your Updated Medication List  
  
   
This list is accurate as of 2/21/18 11:29 AM.  Always use your most recent med list.  
  
  
  
  
 albuterol 90 mcg/actuation inhaler Commonly known as:  PROVENTIL HFA, VENTOLIN HFA, PROAIR HFA Take 2 Puffs by inhalation every four (4) hours as needed for Wheezing. cephALEXin 500 mg capsule Commonly known as:  Catrachito Gaona Take 1 Cap by mouth three (3) times daily for 7 days. fluticasone 50 mcg/actuation nasal spray Commonly known as:  Delmon Pickup 2 Sprays by Both Nostrils route daily. * HUMALOG PEN SC  
by SubCUTAneous route. * HumaLOG KwikPen Insulin 100 unit/mL kwikpen Generic drug:  insulin lispro INJECT 30 UNITS UNDER THE SKIN TID AT MEALS AS INSTRUCTED  
  
 ibuprofen 600 mg tablet Commonly known as:  MOTRIN Take 1 Tab by mouth every six (6) hours as needed for Pain. NOVOFINE 30 30 gauge x 1/3\" Generic drug:  Insulin Needles (Disposable) USE TO INJECT INSULIN TID ONETOUCH VERIO strip Generic drug:  glucose blood VI test strips U TO TEST BLOOD SUGAR 8 TIMES D  
  
 pantoprazole 40 mg tablet Commonly known as:  PROTONIX  
TAKE 1 TABLET BY MOUTH EVERY DAY PRENATAL MULTIVIT WITH IRON PO Take  by mouth. TOUJEO SOLOSTAR U-300 INSULIN 300 unit/mL (1.5 mL) Inpn Generic drug:  insulin glargine  
  
 triamcinolone acetonide 0.5 % ointment Commonly known as:  KENALOG Apply  to affected area two (2) times a day. use thin layer * Notice: This list has 2 medication(s) that are the same as other medications prescribed for you. Read the directions carefully, and ask your doctor or other care provider to review them with you. Prescriptions Sent to Pharmacy Refills  
 cephALEXin (KEFLEX) 500 mg capsule 0 Sig: Take 1 Cap by mouth three (3) times daily for 7 days. Class: Normal  
 Pharmacy: [facility] [employer] [facility] [address] Ph #: [phone] Route: Oral  
 pantoprazole (PROTONIX) 40 mg tablet 3 Sig: TAKE 1 TABLET BY MOUTH EVERY DAY Class: Normal  
 [facility] [address] Ph #: [phone] Introducing [facility] SERVICES! Dear Pia Ramsey: Thank you for requesting a [employer] account. Our records indicate that you have previously registered for a [employer] account but its currently inactive. Please call our [employer] support line at [phone]. Additional Information If you have questions, please visit the Frequently Asked Questions section of the [employer] website at [email or web address] [email or web address] Remember, [employer] is NOT to be used for urgent needs. For medical emergencies, dial 911. Now available from your iPhone and Android! Please provide this summary of care documentation to your next provider. Your primary care clinician is listed as Cadence Ortiz. If you have any questions after today's visit, please call [phone].

## 2018-04-10 ENCOUNTER — OFFICE VISIT (OUTPATIENT)
Dept: INTERNAL MEDICINE CLINIC | Age: 38
End: 2018-04-10

## 2018-04-10 VITALS
BODY MASS INDEX: 41.77 KG/M2 | OXYGEN SATURATION: 95 % | HEART RATE: 95 BPM | TEMPERATURE: 98.3 F | HEIGHT: 62 IN | SYSTOLIC BLOOD PRESSURE: 128 MMHG | WEIGHT: 227 LBS | RESPIRATION RATE: 18 BRPM | DIASTOLIC BLOOD PRESSURE: 65 MMHG

## 2018-04-10 DIAGNOSIS — J40 BRONCHITIS: Primary | ICD-10-CM

## 2018-04-10 DIAGNOSIS — J01.00 ACUTE NON-RECURRENT MAXILLARY SINUSITIS: ICD-10-CM

## 2018-04-10 RX ORDER — ALBUTEROL SULFATE 90 UG/1
2 AEROSOL, METERED RESPIRATORY (INHALATION)
Qty: 1 INHALER | Refills: 1 | Status: SHIPPED | OUTPATIENT
Start: 2018-04-10

## 2018-04-10 RX ORDER — AZITHROMYCIN 250 MG/1
250 TABLET, FILM COATED ORAL SEE ADMIN INSTRUCTIONS
Qty: 6 TAB | Refills: 0 | Status: SHIPPED | OUTPATIENT
Start: 2018-04-10 | End: 2018-04-15

## 2018-04-10 RX ORDER — PROMETHAZINE HYDROCHLORIDE AND DEXTROMETHORPHAN HYDROBROMIDE 6.25; 15 MG/5ML; MG/5ML
5 SYRUP ORAL
Qty: 118 ML | Refills: 0 | Status: SHIPPED | OUTPATIENT
Start: 2018-04-10 | End: 2018-04-17

## 2018-04-10 RX ORDER — METHYLPREDNISOLONE 4 MG/1
TABLET ORAL
Qty: 1 DOSE PACK | Refills: 0 | Status: SHIPPED | OUTPATIENT
Start: 2018-04-10 | End: 2018-06-19

## 2018-04-10 NOTE — PROGRESS NOTES
Health Maintenance Due   Topic Date Due    LIPID PANEL Q1  1978    FOOT EXAM Q1  09/29/1988    EYE EXAM RETINAL OR DILATED Q1  09/29/1988    Pneumococcal 19-64 Medium Risk (1 of 1 - PPSV23) 09/29/1997    DTaP/Tdap/Td series (1 - Tdap) 09/29/1999    PAP AKA CERVICAL CYTOLOGY  09/29/1999    HEMOGLOBIN A1C Q6M  05/26/2012    MICROALBUMIN Q1  10/09/2014    Influenza Age 9 to Adult  08/01/2017       Chief Complaint   Patient presents with    Cold Symptoms     cough, yellow sputum, congestion,        1. Have you been to the ER, urgent care clinic since your last visit? Hospitalized since your last visit? No    2. Have you seen or consulted any other health care providers outside of the [address] [place] [address] since your last visit? Include any pap smears or colon screening. No    3) Do you have an Advance Directive on file? no    4) Are you interested in receiving information on Advance Directives? NO      Patient is accompanied by self I have received verbal consent from Pepe Russell to discuss any/all medical information while they are present in the room.

## 2018-04-10 NOTE — PATIENT INSTRUCTIONS
Bronchitis: Care Instructions  Your Care Instructions    Bronchitis is inflammation of the bronchial tubes, which carry air to the lungs. The tubes swell and produce mucus, or phlegm. The mucus and inflamed bronchial tubes make you cough. You may have trouble breathing. Most cases of bronchitis are caused by viruses like those that cause colds. Antibiotics usually do not help and they may be harmful. Bronchitis usually develops rapidly and lasts about 2 to 3 weeks in otherwise healthy people. Follow-up care is a key part of your treatment and safety. Be sure to make and go to all appointments, and call your doctor if you are having problems. It's also a good idea to know your test results and keep a list of the medicines you take. How can you care for yourself at home? · Take all medicines exactly as prescribed. Call your doctor if you think you are having a problem with your medicine. · Get some extra rest.  · Take an over-the-counter pain medicine, such as acetaminophen (Tylenol), ibuprofen (Advil, Motrin), or naproxen (Aleve) to reduce fever and relieve body aches. Read and follow all instructions on the label. · Do not take two or more pain medicines at the same time unless the doctor told you to. Many pain medicines have acetaminophen, which is Tylenol. Too much acetaminophen (Tylenol) can be harmful. · Take an over-the-counter cough medicine that contains dextromethorphan to help quiet a dry, hacking cough so that you can sleep. Avoid cough medicines that have more than one active ingredient. Read and follow all instructions on the label. · Breathe moist air from a humidifier, hot shower, or sink filled with hot water. The heat and moisture will thin mucus so you can cough it out. · Do not smoke. Smoking can make bronchitis worse. If you need help quitting, talk to your doctor about stop-smoking programs and medicines. These can increase your chances of quitting for good.   When should you call for help? Call 911 anytime you think you may need emergency care. For example, call if:  ? · You have severe trouble breathing. ?Call your doctor now or seek immediate medical care if:  ? · You have new or worse trouble breathing. ? · You cough up dark brown or bloody mucus (sputum). ? · You have a new or higher fever. ? · You have a new rash. ? Watch closely for changes in your health, and be sure to contact your doctor if:  ? · You cough more deeply or more often, especially if you notice more mucus or a change in the color of your mucus. ? · You are not getting better as expected. Where can you learn more? Go to http://shira-obed.info/. Enter H333 in the search box to learn more about \"Bronchitis: Care Instructions. \"  Current as of: May 12, 2017  Content Version: 11.4  © 3230-4664 [employer]. Care instructions adapted under license by [employer] (which disclaims liability or warranty for this information). If you have questions about a medical condition or this instruction, always ask your healthcare professional. [place] [address] any warranty or liability for your use of this information. Sinusitis: Care Instructions  Your Care Instructions    Sinusitis is an infection of the lining of the sinus cavities in your head. Sinusitis often follows a cold. It causes pain and pressure in your head and face. In most cases, sinusitis gets better on its own in 1 to 2 weeks. But some mild symptoms may last for several weeks. Sometimes antibiotics are needed. Follow-up care is a key part of your treatment and safety. Be sure to make and go to all appointments, and call your doctor if you are having problems. It's also a good idea to know your test results and keep a list of the medicines you take. How can you care for yourself at home?   · Take an over-the-counter pain medicine, such as acetaminophen (Tylenol), ibuprofen (Advil, Motrin), or naproxen (Aleve). Read and follow all instructions on the label. · If the doctor prescribed antibiotics, take them as directed. Do not stop taking them just because you feel better. You need to take the full course of antibiotics. · Be careful when taking over-the-counter cold or flu medicines and Tylenol at the same time. Many of these medicines have acetaminophen, which is Tylenol. Read the labels to make sure that you are not taking more than the recommended dose. Too much acetaminophen (Tylenol) can be harmful. · Breathe warm, moist air from a steamy shower, a hot bath, or a sink filled with hot water. Avoid cold, dry air. Using a humidifier in your home may help. Follow the directions for cleaning the machine. · Use saline (saltwater) nasal washes to help keep your nasal passages open and wash out mucus and bacteria. You can buy saline nose drops at a grocery store or drugstore. Or you can make your own at home by adding 1 teaspoon of salt and 1 teaspoon of baking soda to 2 cups of distilled water. If you make your own, fill a bulb syringe with the solution, insert the tip into your nostril, and squeeze gently. [place] your nose. · Put a hot, wet towel or a warm gel pack on your face 3 or 4 times a day for 5 to 10 minutes each time. · Try a decongestant nasal spray like oxymetazoline (Afrin). Do not use it for more than 3 days in a row. Using it for more than 3 days can make your congestion worse. When should you call for help? Call your doctor now or seek immediate medical care if:  ? · You have new or worse swelling or redness in your face or around your eyes. ? · You have a new or higher fever. ? Watch closely for changes in your health, and be sure to contact your doctor if:  ? · You have new or worse facial pain. ? · The mucus from your nose becomes thicker (like pus) or has new blood in it. ? · You are not getting better as expected. Where can you learn more?   Go to http://shira-obed.info/. Enter V521 in the search box to learn more about \"Sinusitis: Care Instructions. \"  Current as of: May 12, 2017  Content Version: 11.4  © 6020-7852 [employer], [employer]. Care instructions adapted under license by [employer] (which disclaims liability or warranty for this information). If you have questions about a medical condition or this instruction, always ask your healthcare professional. [place] [address] any warranty or liability for your use of this information.

## 2018-04-10 NOTE — MR AVS SNAPSHOT
[age] [address] [facility] [address] [facility] 57 
[phone] Patient: Rupal Gandhi MRN: [id number] OPN:[id number] Visit Information Date & Time Provider Department Dept. Phone Encounter #  
 4/10/2018 11:20 AM Jese Jacques NP [facility] [phone] [id number] Upcoming Health Maintenance Date Due  
 LIPID PANEL Q1 1978 FOOT EXAM Q1 9/29/1988 EYE EXAM RETINAL OR DILATED Q1 9/29/1988 Pneumococcal 19-64 Medium Risk (1 of 1 - PPSV23) 9/29/1997 DTaP/Tdap/Td series (1 - Tdap) 9/29/1999 PAP AKA CERVICAL CYTOLOGY 9/29/1999 HEMOGLOBIN A1C Q6M 5/26/2012 MICROALBUMIN Q1 10/9/2014 Influenza Age 5 to Adult 8/1/2017 Allergies as of 4/10/2018  Review Complete On: 4/10/2018 By: Jese Jacques NP Severity Noted Reaction Type Reactions Aspirin  12/12/2010    Itching Orange  11/26/2011    Swelling Sulfa (Sulfonamide Antibiotics)  12/12/2010    Nausea and Vomiting Sudafed [Pseudoephedrine Hcl] Low 11/27/2011   Intolerance Other (comments) Severe head ache Current Immunizations  Never Reviewed No immunizations on file. Not reviewed this visit You Were Diagnosed With   
  
 Codes Comments Bronchitis    -  Primary ICD-10-CM: Y87 ICD-9-CM: 932 Vitals BP Pulse Temp Resp Height(growth percentile) Weight(growth percentile) 128/65 (BP 1 Location: Right arm, BP Patient Position: Sitting) 95 98.3 °F (36.8 °C) (Oral) 18 5' 2\" (1.575 m) 227 lb (103 kg) LMP SpO2 BMI OB Status Smoking Status 03/18/2018 95% 41.52 kg/m2 Having regular periods Never Smoker Vitals History BMI and BSA Data Body Mass Index Body Surface Area 41.52 kg/m 2 2.12 m 2 Preferred Pharmacy Pharmacy Name Phone [facility] [phone] Your Updated Medication List  
  
   
This list is accurate as of 4/10/18 11:49 AM.  Always use your most recent med list.  
  
  
  
  
 albuterol 90 mcg/actuation inhaler Commonly known as:  PROVENTIL HFA, VENTOLIN HFA, PROAIR HFA Take 2 Puffs by inhalation every four (4) hours as needed for Wheezing. azithromycin 250 mg tablet Commonly known as:  [place] Bartholomew Take 1 Tab by mouth See Admin Instructions for 5 days. HUMALOG PEN SC  
by SubCUTAneous route. ibuprofen 600 mg tablet Commonly known as:  MOTRIN Take 1 Tab by mouth every six (6) hours as needed for Pain. methylPREDNISolone 4 mg tablet Commonly known as:  Luis Angel Stellaris Use as directed NOVOFINE 30 30 gauge x 1/3\" Generic drug:  Insulin Needles (Disposable) USE TO INJECT INSULIN TID ONETOUCH VERIO strip Generic drug:  glucose blood VI test strips U TO TEST BLOOD SUGAR 8 TIMES D  
  
 pantoprazole 40 mg tablet Commonly known as:  PROTONIX  
TAKE 1 TABLET BY MOUTH EVERY DAY PRENATAL MULTIVIT WITH IRON PO Take  by mouth.  
  
 promethazine-dextromethorphan 6.25-15 mg/5 mL syrup Commonly known as:  PROMETHAZINE-DM Take 5 mL by mouth every four (4) hours as needed for Cough for up to 7 days. TOUJEO SOLOSTAR U-300 INSULIN 300 unit/mL (1.5 mL) Inpn Generic drug:  insulin glargine  
  
 triamcinolone acetonide 0.5 % ointment Commonly known as:  KENALOG Apply  to affected area two (2) times a day. use thin layer Prescriptions Sent to Pharmacy Refills  
 albuterol (PROVENTIL HFA, VENTOLIN HFA, PROAIR HFA) 90 mcg/actuation inhaler 1 Sig: Take 2 Puffs by inhalation every four (4) hours as needed for Wheezing. Class: Normal  
 Pharmacy: [facility] Ph #: [phone] Route: Inhalation  
 methylPREDNISolone (MEDROL DOSEPACK) 4 mg tablet 0 Sig: Use as directed Class: Normal  
 Pharmacy: [place] [id number] Ph #: [phone]  
 promethazine-dextromethorphan (PROMETHAZINE-DM) 6.25-15 mg/5 mL syrup 0 Sig: Take 5 mL by mouth every four (4) hours as needed for Cough for up to 7 days. Class: Normal  
 Pharmacy: [place] [id number] Ph #: [phone] Route: Oral  
 azithromycin (ZITHROMAX) 250 mg tablet 0 Sig: Take 1 Tab by mouth See Admin Instructions for 5 days. Class: Normal  
 Pharmacy: [address] [facility] Ph #: [phone] Route: Oral  
  
Introducing [facility]! Dear Lacey Meade: Thank you for requesting a [employer] account. Our records indicate that you have previously registered for a [employer] account but its currently inactive. Please call our [employer] support line at [phone]. Additional Information If you have questions, please visit the Frequently Asked Questions section of the [employer] website at [email or web address] Remember, [employer] is NOT to be used for urgent needs. For medical emergencies, dial 911. Now available from your iPhone and Android! Please provide this summary of care documentation to your next provider. Your primary care clinician is listed as Cadence Ortiz. If you have any questions after today's visit, please call [phone].

## 2018-04-10 NOTE — PROGRESS NOTES
Subjective:     Chief Complaint   Patient presents with    Cold Symptoms     cough, yellow sputum, congestion,     Diabetic Foot Exam     eye exam, (done last spring, requesting)       History of Present Illness    Ole Ceja is a 42y.o. year old female who presents for complaints of URI symptoms. She states that 2 weeks ago she began with chest congestion, cough, and sinus pressure. She reports a try cough. She has not taken anything for her symptoms. She denies any fever. She has been using albuterol with relief. She needs a refill. NAD. She denies any other new complaints today. She reports increasing her physical activity. She has been going to the gym on a schedule for 12 weeks. No CP, SOB, GI, or  symptoms. Reviewed medications, recent lab work and imaging with patient. Pt reports compliance with medications. Current Outpatient Prescriptions on File Prior to Visit   Medication Sig Dispense Refill    pantoprazole (PROTONIX) 40 mg tablet TAKE 1 TABLET BY MOUTH EVERY DAY 30 Tab 3    pantoprazole (PROTONIX) 40 mg tablet TAKE 1 TABLET BY MOUTH EVERY DAY 30 Tab 3    TOUJEO SOLOSTAR 300 unit/mL (1.5 mL) inpn       HUMALOG KWIKPEN 100 unit/mL kwikpen INJECT 30 UNITS UNDER THE SKIN TID AT MEALS AS INSTRUCTED  1    NOVOFINE 30 30 gauge x 1/3\" USE TO INJECT INSULIN TID  2    ONETOUCH VERIO strip U TO TEST BLOOD SUGAR 8 TIMES D  4    triamcinolone acetonide (KENALOG) 0.5 % ointment Apply  to affected area two (2) times a day. use thin layer 30 g 0    ibuprofen (MOTRIN) 600 mg tablet Take 1 Tab by mouth every six (6) hours as needed for Pain. 30 Tab 0    albuterol (PROVENTIL HFA, VENTOLIN HFA, PROAIR HFA) 90 mcg/actuation inhaler Take 2 Puffs by inhalation every four (4) hours as needed for Wheezing. 1 Inhaler 1    fluticasone (FLONASE) 50 mcg/actuation nasal spray 2 Sprays by Both Nostrils route daily. 1 Bottle 0    INSULIN LISPRO (HUMALOG PEN SC) by SubCUTAneous route.       PNV #32/[id number] FUM/FOLIC ACID (PRENATAL MULTIVIT WITH IRON PO) Take  by mouth. No current facility-administered medications on file prior to visit. Allergies   Allergen Reactions    Aspirin Itching    Orange Swelling    Sulfa (Sulfonamide Antibiotics) Nausea and Vomiting    Sudafed [Pseudoephedrine Hcl] Other (comments)     Severe head ache      Past Medical History:   Diagnosis Date    Diabetes ([address]     Gastrointestinal disorder     GERD    GERD (gastroesophageal reflux disease)     Thyroid disease       Past Surgical History:   Procedure Laterality Date    HX  SECTION      HX CHOLECYSTECTOMY      HX HEENT      eye surgery    HX LIPOMA RESECTION      HX ORTHOPAEDIC      Carpul jesus manuel    HX OTHER SURGICAL      lipoma removal      Social History   Substance Use Topics    Smoking status: Never Smoker    Smokeless tobacco: Never Used    Alcohol use No      Family History   Problem Relation Age of Onset    Thyroid Disease Mother     Heart Disease Father     Cancer Father 61     Colon cancer    Alcohol abuse Father     Cancer Paternal Aunt      Breast cancer    Arthritis-rheumatoid Paternal Aunt     Cancer Paternal Aunt      breast cancer        Objective:     Vitals:    04/10/18 1130   BP: 128/65   Pulse: 95   Resp: 18   Temp: 98.3 °F (36.8 °C)   TempSrc: Oral   SpO2: 95%   Weight: 227 lb (103 kg)   Height: 5' 2\" (1.575 m)       Review of Systems   Constitutional: Negative. HENT: Positive for congestion and sinus pain. Eyes: Negative. Respiratory: Positive for cough. Cardiovascular: Positive for PND. Gastrointestinal: Negative. Genitourinary: Negative. Musculoskeletal: Negative. Skin: Negative. Neurological: Negative. Psychiatric/Behavioral: Negative. Physical Exam   Constitutional: She is oriented to person, place, and time. She appears well-developed and well-nourished. No distress. Well-appearing  female.   NAD HENT:   Head: Normocephalic and atraumatic. Right Ear: Tympanic membrane normal.   Left Ear: Tympanic membrane normal.   Nose: Mucosal edema and rhinorrhea present. Right sinus exhibits maxillary sinus tenderness. Left sinus exhibits maxillary sinus tenderness. Mouth/Throat: Posterior oropharyngeal erythema present. Eyes: Conjunctivae and EOM are normal. Pupils are equal, round, and reactive to light. Neck: Normal range of motion. Neck supple. Cardiovascular: Normal rate, regular rhythm and normal heart sounds. Pulmonary/Chest: Effort normal and breath sounds normal. No respiratory distress. She has no wheezes. Abdominal: She exhibits no distension. Musculoskeletal: Normal range of motion. She exhibits no edema, tenderness or deformity. Neurological: She is alert and oriented to person, place, and time. Skin: Skin is warm and dry. No rash noted. She is not diaphoretic. No erythema. No pallor. Psychiatric: She has a normal mood and affect. Her behavior is normal.   Nursing note and vitals reviewed. Diabetic foot exam:     Left: Reflexes 3+     Vibratory sensation normal    Proprioception normal   Sharp/dull discrimination normal    Filament test normal sensation with micro filament   Pulse DP: 2+ (normal)   Pulse PT: 2+ (normal)   Deformities: None  Right: Reflexes 3+   Vibratory sensation normal   Proprioception normal   Sharp/dull discrimination normal   Filament test normal sensation with micro filament   Pulse DP: 2+ (normal)   Pulse PT: 2+ (normal)   Deformities: None    Assessment/ Plan:     Diagnoses and all orders for this visit:    1. Bronchitis   Will order  -     albuterol (PROVENTIL HFA, VENTOLIN HFA, PROAIR HFA) 90 mcg/actuation inhaler; Take 2 Puffs by inhalation every four (4) hours as needed for Wheezing.  -     methylPREDNISolone (MEDROL DOSEPACK) 4 mg tablet; Use as directed  -     promethazine-dextromethorphan (PROMETHAZINE-DM) 6.25-15 mg/5 mL syrup;  Take 5 mL by mouth every four (4) hours as needed for Cough for up to 7 days. 2. Acute non-recurrent maxillary sinusitis   Will order  -     methylPREDNISolone (MEDROL DOSEPACK) 4 mg tablet; Use as directed  -     azithromycin (ZITHROMAX) 250 mg tablet; Take 1 Tab by mouth See Admin Instructions for 5 days. Patient's plan of care has been reviewed with them. Patient and/or family have verbally conveyed their understanding and agreement of the patient's signs, symptoms, diagnosis, treatment and prognosis and additionally agree to follow up as recommended or return to [facility] should their condition change prior to follow-up. Discharge instructions have also been provided to the patient with some educational information regarding their diagnosis as well a list of reasons why they would want to return to the office prior to their follow-up appointment should their condition change. Follow-up as scheduled.

## 2018-06-19 ENCOUNTER — OFFICE VISIT (OUTPATIENT)
Dept: URGENT CARE | Age: 38
End: 2018-06-19

## 2018-06-19 VITALS
OXYGEN SATURATION: 98 % | TEMPERATURE: 97.8 F | RESPIRATION RATE: 16 BRPM | DIASTOLIC BLOOD PRESSURE: 64 MMHG | SYSTOLIC BLOOD PRESSURE: 135 MMHG | HEART RATE: 87 BPM | BODY MASS INDEX: 4.93 KG/M2 | WEIGHT: 26.8 LBS | HEIGHT: 62 IN

## 2018-06-19 DIAGNOSIS — R21 RASH: Primary | ICD-10-CM

## 2018-06-19 DIAGNOSIS — M79.645 THUMB PAIN, LEFT: ICD-10-CM

## 2018-06-19 RX ORDER — CEPHALEXIN 500 MG/1
500 CAPSULE ORAL 3 TIMES DAILY
Qty: 21 CAP | Refills: 0 | Status: SHIPPED | OUTPATIENT
Start: 2018-06-19 | End: 2018-06-26

## 2018-06-19 RX ORDER — NAPROXEN 500 MG/1
500 TABLET ORAL
Qty: 20 TAB | Refills: 0 | Status: SHIPPED | OUTPATIENT
Start: 2018-06-19 | End: 2022-08-03

## 2018-06-19 RX ORDER — INSULIN LISPRO 100 [IU]/ML
INJECTION, SOLUTION INTRAVENOUS; SUBCUTANEOUS
Refills: 4 | COMMUNITY
Start: 2018-03-31 | End: 2022-07-28

## 2018-06-19 NOTE — MR AVS SNAPSHOT
[address] [facility] [address] 
[phone] Patient: Pretty Macario MRN: [id number] KCE:[birth year]92[birth year] Visit Information Date & Time Provider Department Dept. Phone Encounter #  
 6/19/2018  9:45 AM [address] [facility] [phone] [id number] Upcoming Health Maintenance Date Due  
 LIPID PANEL Q1 1978 EYE EXAM RETINAL OR DILATED Q1 9/29/1988 Pneumococcal 19-64 Medium Risk (1 of 1 - PPSV23) 9/29/1997 DTaP/Tdap/Td series (1 - Tdap) 9/29/1999 PAP AKA CERVICAL CYTOLOGY 9/29/1999 HEMOGLOBIN A1C Q6M 5/26/2012 MICROALBUMIN Q1 10/9/2014 Influenza Age 5 to Adult 9/1/2018* FOOT EXAM Q1 4/10/2019 *Topic was postponed. The date shown is not the original due date. Allergies as of 6/19/2018  Review Complete On: 6/19/2018 By: Annia Owens RN Severity Noted Reaction Type Reactions Aspirin  12/12/2010    Itching Orange  11/26/2011    Swelling Sulfa (Sulfonamide Antibiotics)  12/12/2010    Nausea and Vomiting Sudafed [Pseudoephedrine Hcl] Low 11/27/2011   Intolerance Other (comments) Severe head ache Current Immunizations  Never Reviewed No immunizations on file. Not reviewed this visit You Were Diagnosed With   
  
 Codes Comments Rash    -  Primary ICD-10-CM: R21 
ICD-9-CM: 782.1 Thumb pain, left     ICD-10-CM: A48.738 ICD-9-CM: 729.5 Vitals BP Pulse Temp Resp Height(growth percentile) Weight(growth percentile) 135/64 87 97.8 °F (36.6 °C) 16 5' 2\" (1.575 m) 26 lb 12.8 oz (12.2 kg) LMP SpO2 BMI OB Status Smoking Status 06/15/2018 98% 4.9 kg/m2 Having regular periods Never Smoker BMI and BSA Data Body Mass Index Body Surface Area 4.9 kg/m 2 0.73 m 2 Preferred Pharmacy Pharmacy Name [address] [phone] Your Updated Medication List  
  
   
This list is accurate as of 6/19/18 10:11 AM.  Always use your most recent med list.  
  
  
  
  
 albuterol 90 mcg/actuation inhaler Commonly known as:  PROVENTIL HFA, VENTOLIN HFA, PROAIR HFA Take 2 Puffs by inhalation every four (4) hours as needed for Wheezing. cephALEXin 500 mg capsule Commonly known as:  Isaak Neer Take 1 Cap by mouth three (3) times daily for 7 days. * HUMALOG PEN SC  
by SubCUTAneous route. * HumaLOG KwikPen Insulin 100 unit/mL kwikpen Generic drug:  insulin lispro INJECT 30 UNITS UNDER THE SKIN TID AT MEALS AS INSTRUCTED  
  
 ibuprofen 600 mg tablet Commonly known as:  MOTRIN Take 1 Tab by mouth every six (6) hours as needed for Pain. naproxen 500 mg tablet Commonly known as:  NAPROSYN Take 1 Tab by mouth every twelve (12) hours as needed for Pain. NOVOFINE 30 30 gauge x 1/3\" Generic drug:  Insulin Needles (Disposable) USE TO INJECT INSULIN TID ONETOUCH VERIO strip Generic drug:  glucose blood VI test strips U TO TEST BLOOD SUGAR 8 TIMES D  
  
 pantoprazole 40 mg tablet Commonly known as:  PROTONIX  
TAKE 1 TABLET BY MOUTH EVERY DAY PRENATAL MULTIVIT WITH IRON PO Take  by mouth. TOUJEO SOLOSTAR U-300 INSULIN 300 unit/mL (1.5 mL) Inpn Generic drug:  insulin glargine  
  
 triamcinolone acetonide 0.5 % ointment Commonly known as:  KENALOG Apply  to affected area two (2) times a day. use thin layer * Notice: This list has 2 medication(s) that are the same as other medications prescribed for you. Read the directions carefully, and ask your doctor or other care provider to review them with you. Prescriptions Sent to Pharmacy Refills  
 naproxen (NAPROSYN) 500 mg tablet 0 Sig: Take 1 Tab by mouth every twelve (12) hours as needed for Pain.   
 Class: Normal  
 Pharmacy: [facility] [facility] [address] Ph #: [phone] Route: Oral  
 cephALEXin (KEFLEX) 500 mg capsule 0 Sig: Take 1 Cap by mouth three (3) times daily for 7 days. Class: Normal  
 Pharmacy: [facility] [address] Ph #: [phone] Route: Oral  
  
Patient Instructions Rash: Care Instructions Your Care Instructions A rash is any irritation or inflammation of the skin. Rashes have many possible causes, including allergy, infection, illness, heat, and emotional stress. Follow-up care is a key part of your treatment and safety. Be sure to make and go to all appointments, and call your doctor if you are having problems. It's also a good idea to know your test results and keep a list of the medicines you take. How can you care for yourself at home? · Wash the area with water only. Soap can make dryness and itching worse. Pat dry. · Put cold, wet cloths on the rash to reduce itching. · Keep cool, and stay out of the sun. · Leave the rash open to the air as much of the time as possible. · Sometimes petroleum jelly (Vaseline) can help relieve the discomfort caused by a rash. A moisturizing lotion, such as Cetaphil, also may help. Calamine lotion may help for rashes caused by contact with something (such as a plant or soap) that irritated the skin. Use it 3 or 4 times a day. · If your doctor prescribed a cream, use it as directed. If your doctor prescribed medicine, take it exactly as directed. · If your rash itches so badly that it interferes with your normal activities, take an over-the-counter antihistamine, such as diphenhydramine (Benadryl) or loratadine (Claritin). Read and follow all instructions on the label. When should you call for help? Call your doctor now or seek immediate medical care if: 
? · You have signs of infection, such as: 
¨ Increased pain, swelling, warmth, or redness. ¨ Red streaks leading from the area. ¨ Pus draining from the area. ¨ A fever. ? · You have joint pain along with the rash. ? Watch closely for changes in your health, and be sure to contact your doctor if: 
? · Your rash is changing or getting worse. For example, call if you have pain along with the rash, the rash is spreading, or you have new blisters. ? · You do not get better after 1 week. Where can you learn more? Go to http://shira-obed.info/. Enter C439 in the search box to learn more about \"Rash: Care Instructions. \" Current as of: October 13, 2016 Content Version: 11.4 © 7509-0655 [employer]. Care instructions adapted under license by "[employer] (which disclaims liability or warranty for this information). If you have questions about a medical condition or this instruction, always ask your healthcare professional. [place] [address] any warranty or liability for your use of this information. Introducing [address]! Dear Viviana Dillon: Thank you for requesting a [employer] account. Our records indicate that you already have an active [employer] account. You can access your account anytime at [email or web address] Did you know that you can access your hospital and ER discharge instructions at any time in [employer]? You can also review all of your test results from your hospital stay or ER visit. Additional Information If you have questions, please visit the Frequently Asked Questions section of the [employer] website at [email or web address] Remember, [employer] is NOT to be used for urgent needs. For medical emergencies, dial 911. Now available from your iPhone and Android! Please provide this summary of care documentation to your next provider. Your primary care clinician is listed as Cadence Ortiz.  If you have any questions after today's visit, please call [phone].

## 2018-06-19 NOTE — PATIENT INSTRUCTIONS
Rash: Care Instructions  Your Care Instructions  A rash is any irritation or inflammation of the skin. Rashes have many possible causes, including allergy, infection, illness, heat, and emotional stress. Follow-up care is a key part of your treatment and safety. Be sure to make and go to all appointments, and call your doctor if you are having problems. It's also a good idea to know your test results and keep a list of the medicines you take. How can you care for yourself at home? · Wash the area with water only. Soap can make dryness and itching worse. Pat dry. · Put cold, wet cloths on the rash to reduce itching. · Keep cool, and stay out of the sun. · Leave the rash open to the air as much of the time as possible. · Sometimes petroleum jelly (Vaseline) can help relieve the discomfort caused by a rash. A moisturizing lotion, such as Cetaphil, also may help. Calamine lotion may help for rashes caused by contact with something (such as a plant or soap) that irritated the skin. Use it 3 or 4 times a day. · If your doctor prescribed a cream, use it as directed. If your doctor prescribed medicine, take it exactly as directed. · If your rash itches so badly that it interferes with your normal activities, take an over-the-counter antihistamine, such as diphenhydramine (Benadryl) or loratadine (Claritin). Read and follow all instructions on the label. When should you call for help? Call your doctor now or seek immediate medical care if:  ? · You have signs of infection, such as:  ¨ Increased pain, swelling, warmth, or redness. ¨ Red streaks leading from the area. ¨ Pus draining from the area. ¨ A fever. ? · You have joint pain along with the rash. ? Watch closely for changes in your health, and be sure to contact your doctor if:  ? · Your rash is changing or getting worse. For example, call if you have pain along with the rash, the rash is spreading, or you have new blisters.    ? · You do not get better after 1 week. Where can you learn more? Go to http://shira-obed.info/. Enter U941 in the search box to learn more about \"Rash: Care Instructions. \"  Current as of: October 13, 2016  Content Version: 11.4  © 6415-1510 [employer]. Care instructions adapted under license by [employer] (which disclaims liability or warranty for this information). If you have questions about a medical condition or this instruction, always ask your healthcare professional. [address] any warranty or liability for your use of this information.

## 2018-06-19 NOTE — PROGRESS NOTES
Patient is a 42 y.o. female presenting with rash and hand pain. The history is provided by the patient. Rash    This is a new problem. Episode onset: started about 2-3 weeks ago. started behind right ear, then spread to behind left ear. rash is bumpy, mildly red. nowhere else on scalp/face. pt tried triamcinolone ointment for the past 5 days and rash actually got a little worse. The problem has been gradually worsening. The problem is associated with nothing (denies any new cosmetics, detergents, lotions, etc. No headgear/glasses, etc.). There has been no fever. The patient is experiencing no pain. Pertinent negatives include no blisters, no pain, no weeping and no hives. She has tried steroid cream for the symptoms. The treatment provided no relief. Hand Pain   This is a new (Left thumb pain. Located over IP joint. No redness, swelling. No trauma. No joint pain anywhere else. No joint deformity. Only mild ache, not constant. No hx of repetitive movement) problem. Episode onset: 1 week. Episode frequency: occasionally. The problem has not changed since onset. Pertinent negatives include no chest pain, no abdominal pain, no headaches and no shortness of breath. Nothing aggravates the symptoms. The symptoms are relieved by medications. Treatments tried: ibuprofen. The treatment provided mild relief.         Past Medical History:   Diagnosis Date    Diabetes ([address]     Gastrointestinal disorder     GERD    GERD (gastroesophageal reflux disease)     Thyroid disease         Past Surgical History:   Procedure Laterality Date    HX  SECTION      HX CHOLECYSTECTOMY      HX HEENT      eye surgery    HX LIPOMA RESECTION      HX ORTHOPAEDIC      Carpul jesus manuel    HX OTHER SURGICAL      lipoma removal         Family History   Problem Relation Age of Onset    Thyroid Disease Mother     Heart Disease Father     Cancer Father 61     Colon cancer    Alcohol abuse Father     Cancer Paternal Aunt Breast cancer    Arthritis-rheumatoid Paternal Aunt     Cancer Paternal Aunt      breast cancer        Social History     Social History    Marital status:      Spouse name: N/A    Number of children: N/A    Years of education: N/A     Occupational History     Self Employed     Social History Main Topics    Smoking status: Never Smoker    Smokeless tobacco: Never Used    Alcohol use No    Drug use: No    Sexual activity: Yes     Partners: Male     Birth control/ protection: Condom      Comment:  has 2 children     Other Topics Concern    Not on file     Social History Narrative                ALLERGIES: Aspirin; Orange; Sulfa (sulfonamide antibiotics); and Sudafed [pseudoephedrine hcl]    Review of Systems   Constitutional: Negative for chills, diaphoresis, fever and unexpected weight change. HENT: Negative for rhinorrhea and sore throat. Eyes: Negative for pain. Respiratory: Negative for shortness of breath, wheezing and stridor. Cardiovascular: Negative for chest pain and leg swelling. Gastrointestinal: Negative for abdominal pain, blood in stool, diarrhea, nausea and vomiting. Genitourinary: Negative for difficulty urinating, dysuria and flank pain. Musculoskeletal: Positive for arthralgias (left thumb). Negative for back pain and neck stiffness. Skin: Positive for rash. Neurological: Negative for seizures, syncope, weakness, light-headedness and headaches. Psychiatric/Behavioral: Negative for confusion. Vitals:    06/19/18 0953   BP: 135/64   Pulse: 87   Resp: 16   Temp: 97.8 °F (36.6 °C)   SpO2: 98%   Weight: 26 lb 12.8 oz (12.2 kg)   Height: 5' 2\" (1.575 m)       Physical Exam   Constitutional: She is oriented to person, place, and time. She appears well-developed. No distress. HENT:   Head: Normocephalic and atraumatic.    Right Ear: External ear normal.   Left Ear: External ear normal.   Eyes: Conjunctivae are normal. Right eye exhibits no discharge. Left eye exhibits no discharge. Cardiovascular: Normal rate. Pulmonary/Chest: Effort normal.   Musculoskeletal: Normal range of motion. She exhibits no edema or deformity. Mild ttp over left thumb IP joint. No swelling/erythema. FROM. Neg finkelstein test   Neurological: She is alert and oriented to person, place, and time. Skin: Skin is warm and dry. Rash (papular, mildly erythematous rash in the bilateral posterior auricular area) noted. She is not diaphoretic. MDM     Differential Diagnosis; Clinical Impression; Plan:     Posterior auricular skin rash, refractory to steroids. Does not appear fungal in nature. No evidence mastoiditis. Will tx with empiric abx. Left thumb pain. Unclear etiology. No evidence of septic joint or rheumatologic process. Conservative tx with nsaids for now.       Procedures

## 2018-06-27 ENCOUNTER — OFFICE VISIT (OUTPATIENT)
Dept: INTERNAL MEDICINE CLINIC | Age: 38
End: 2018-06-27

## 2018-06-27 VITALS
WEIGHT: 226.7 LBS | OXYGEN SATURATION: 98 % | SYSTOLIC BLOOD PRESSURE: 132 MMHG | HEIGHT: 62 IN | DIASTOLIC BLOOD PRESSURE: 65 MMHG | HEART RATE: 88 BPM | TEMPERATURE: 97.2 F | BODY MASS INDEX: 41.72 KG/M2 | RESPIRATION RATE: 20 BRPM

## 2018-06-27 DIAGNOSIS — L98.9 SKIN PROBLEM: Primary | ICD-10-CM

## 2018-06-27 RX ORDER — CEPHALEXIN 500 MG/1
500 CAPSULE ORAL 2 TIMES DAILY
Qty: 14 CAP | Refills: 0 | Status: SHIPPED | OUTPATIENT
Start: 2018-06-27 | End: 2018-07-04

## 2018-06-27 RX ORDER — BACITRACIN ZINC 500 UNIT/G
OINTMENT (GRAM) TOPICAL 2 TIMES DAILY
Qty: 15 G | Refills: 0 | Status: SHIPPED | OUTPATIENT
Start: 2018-06-27 | End: 2019-02-12

## 2018-06-27 NOTE — PROGRESS NOTES
Subjective:     Chief Complaint   Patient presents with    Rash     rash behind ears       History of Present Illness    Corinne Reyna is a 42y.o. year old female who presents for evaluation. She was recently seen at [facility] for complaints of a rash behind the ears. She was prescribed Keflex and told to follow-up with PCP. She states since taking the Keflex the areas have improved but are not fully resolved. She does report itching. She denies fever or pain. No other complaints at this time. NAD. No CP, SOB, GI, or  symptoms. Reviewed medications, recent lab work and imaging with patient. Pt reports compliance with medications. Current Outpatient Prescriptions on File Prior to Visit   Medication Sig Dispense Refill    HUMALOG KWIKPEN INSULIN 100 unit/mL kwikpen INJECT 30 UNITS UNDER THE SKIN TID AT MEALS AS INSTRUCTED  4    naproxen (NAPROSYN) 500 mg tablet Take 1 Tab by mouth every twelve (12) hours as needed for Pain. 20 Tab 0    albuterol (PROVENTIL HFA, VENTOLIN HFA, PROAIR HFA) 90 mcg/actuation inhaler Take 2 Puffs by inhalation every four (4) hours as needed for Wheezing. 1 Inhaler 1    pantoprazole (PROTONIX) 40 mg tablet TAKE 1 TABLET BY MOUTH EVERY DAY 30 Tab 3    TOUJEO SOLOSTAR 300 unit/mL (1.5 mL) inpn       NOVOFINE 30 30 gauge x 1/3\" USE TO INJECT INSULIN TID  2    ONETOUCH VERIO strip U TO TEST BLOOD SUGAR 8 TIMES D  4    ibuprofen (MOTRIN) 600 mg tablet Take 1 Tab by mouth every six (6) hours as needed for Pain. 30 Tab 0    INSULIN LISPRO (HUMALOG PEN SC) by SubCUTAneous route.  PNV #95/[id number] FUM/FOLIC ACID (PRENATAL MULTIVIT WITH IRON PO) Take  by mouth.  [] cephALEXin (KEFLEX) 500 mg capsule Take 1 Cap by mouth three (3) times daily for 7 days. 21 Cap 0     No current facility-administered medications on file prior to visit.         Allergies   Allergen Reactions    Aspirin Itching    Orange Swelling    Sulfa (Sulfonamide Antibiotics) Nausea and Vomiting    Sudafed [Pseudoephedrine Hcl] Other (comments)     Severe head ache      Past Medical History:   Diagnosis Date    Diabetes ([address]     Gastrointestinal disorder     GERD    GERD (gastroesophageal reflux disease)     Thyroid disease       Past Surgical History:   Procedure Laterality Date    HX  SECTION      HX CHOLECYSTECTOMY  2013    HX HEENT      eye surgery    HX LIPOMA RESECTION      HX ORTHOPAEDIC      Carpul jesus manuel    HX OTHER SURGICAL      lipoma removal      Social History   Substance Use Topics    Smoking status: Never Smoker    Smokeless tobacco: Never Used    Alcohol use No      Family History   Problem Relation Age of Onset    Thyroid Disease Mother     Heart Disease Father     Cancer Father 61     Colon cancer    Alcohol abuse Father     Cancer Paternal Aunt      Breast cancer    Arthritis-rheumatoid Paternal Aunt     Cancer Paternal Aunt      breast cancer        Objective:     Vitals:    18 1019   BP: 132/65   Pulse: 88   Resp: 20   Temp: 97.2 °F (36.2 °C)   TempSrc: Oral   SpO2: 98%   Weight: 226 lb 11.2 oz (102.8 kg)   Height: 5' 2\" (1.575 m)       Review of Systems   Constitutional: Negative. HENT: Negative. Eyes: Negative. Respiratory: Negative. Cardiovascular: Negative. Gastrointestinal: Negative. Genitourinary: Negative. Musculoskeletal: Negative. Skin: Positive for itching and rash. Neurological: Negative. Psychiatric/Behavioral: Negative. Physical Exam   Constitutional: She is oriented to person, place, and time. She appears well-developed and well-nourished. No distress. Well-appearing  female. NAD. HENT:   Head: Normocephalic and atraumatic. Eyes: Conjunctivae and EOM are normal. Pupils are equal, round, and reactive to light. Neck: Normal range of motion. Neck supple. Cardiovascular: Normal rate, regular rhythm and normal heart sounds.     Pulmonary/Chest: Effort normal and breath sounds normal. No respiratory distress. She has no wheezes. Abdominal: She exhibits no distension. Musculoskeletal: Normal range of motion. She exhibits no edema, tenderness or deformity. Neurological: She is alert and oriented to person, place, and time. Skin: Skin is warm and dry. No rash noted. She is not diaphoretic. No erythema. No pallor. One small papule behind each ear. No drainage, erythema, or pain. Psychiatric: She has a normal mood and affect. Her behavior is normal.   Nursing note and vitals reviewed. Assessment/ Plan:   Diagnoses and all orders for this visit:    1. Skin problem   Will order  -     bacitracin zinc (BACITRACIN) ointment; Apply  to affected area two (2) times a day. -     cephALEXin (KEFLEX) 500 mg capsule; Take 1 Cap by mouth two (2) times a day for 7 days. (Watchful waiting. Only use if symptoms do not improve). Patient's plan of care has been reviewed with them. Patient and/or family have verbally conveyed their understanding and agreement of the patient's signs, symptoms, diagnosis, treatment and prognosis and additionally agree to follow up as recommended or return to [facility] should their condition change prior to follow-up. Discharge instructions have also been provided to the patient with some educational information regarding their diagnosis as well a list of reasons why they would want to return to the office prior to their follow-up appointment should their condition change. Follow-up as scheduled.

## 2018-06-27 NOTE — PATIENT INSTRUCTIONS
Dermatitis: Care Instructions  Your Care Instructions  Dermatitis is the general name used for any rash or inflammation of the skin. Different kinds of dermatitis cause different kinds of rashes. Common causes of a rash include new medicines, plants (such as poison oak or poison ivy), heat, and stress. Certain illnesses can also cause a rash. An allergic reaction to something that touches your skin, such as latex, nickel, or poison ivy, is called contact dermatitis. Contact dermatitis may also be caused by something that irritates the skin, such as bleach, a chemical, or soap. These types of rashes cannot be spread from person to person. How long your rash will last depends on what caused it. Rashes may last a few days or months. Follow-up care is a key part of your treatment and safety. Be sure to make and go to all appointments, and call your doctor if you are having problems. It's also a good idea to know your test results and keep a list of the medicines you take. How can you care for yourself at home? · Do not scratch the rash. Cut your nails short, and file them smooth. Or wear gloves if this helps keep you from scratching. · Wash the area with water only. Pat dry. · Put cold, wet cloths on the rash to reduce itching. · Keep cool, and stay out of the sun. · Leave the rash open to the air as much as possible. · If the rash itches, use hydrocortisone cream. Follow the directions on the label. Calamine lotion may help for plant rashes. · Take an over-the-counter antihistamine, such as diphenhydramine (Benadryl) or loratadine (Claritin), to help calm the itching. Read and follow all instructions on the label. · If your doctor prescribed a cream, use it as directed. If your doctor prescribed medicine, take it exactly as directed. When should you call for help?   Call your doctor now or seek immediate medical care if:  ? · You have symptoms of infection, such as:  ¨ Increased pain, swelling, warmth, or redness. ¨ Red streaks leading from the area. ¨ Pus draining from the area. ¨ A fever. ? · You have joint pain along with the rash. ? Watch closely for changes in your health, and be sure to contact your doctor if:  ? · Your rash is changing or getting worse. ? · You are not getting better as expected. Where can you learn more? Go to http://shira-obed.info/. Enter [phone] in the search box to learn more about \"Dermatitis: Care Instructions. \"  Current as of: October 13, 2016  Content Version: 11.4  © 3631-2695 [employer]. Care instructions adapted under license by [employer] (which disclaims liability or warranty for this information). If you have questions about a medical condition or this instruction, always ask your healthcare professional. [address] any warranty or liability for your use of this information.

## 2018-06-27 NOTE — MR AVS SNAPSHOT
[address] [facility] 102 1400 [address] [place] [address] 
[phone] Patient: Christiano Villar MRN: [id number]:[id number] Visit Information Date & Time Provider Department Dept. Phone Encounter #  
 6/27/2018 10:20 AM Rick Licea NP [facility] [phone] [id number] Upcoming Health Maintenance Date Due  
 LIPID PANEL Q1 1978 EYE EXAM RETINAL OR DILATED Q1 9/29/1988 Pneumococcal 19-64 Medium Risk (1 of 1 - PPSV23) 9/29/1997 DTaP/Tdap/Td series (1 - Tdap) 9/29/1999 PAP AKA CERVICAL CYTOLOGY 9/29/1999 HEMOGLOBIN A1C Q6M 5/26/2012 MICROALBUMIN Q1 10/9/2014 Influenza Age 5 to Adult 9/1/2018* FOOT EXAM Q1 4/10/2019 *Topic was postponed. The date shown is not the original due date. Allergies as of 6/27/2018  Review Complete On: 6/27/2018 By: Libra Brady LPN Severity Noted Reaction Type Reactions Aspirin  12/12/2010    Itching Orange  11/26/2011    Swelling Sulfa (Sulfonamide Antibiotics)  12/12/2010    Nausea and Vomiting Sudafed [Pseudoephedrine Hcl] Low 11/27/2011   Intolerance Other (comments) Severe head ache Current Immunizations  Never Reviewed No immunizations on file. Not reviewed this visit You Were Diagnosed With   
  
 Codes Comments Skin problem    -  Primary ICD-10-CM: L98.9 ICD-9-CM: 709.9 Vitals BP Pulse Temp Resp Height(growth percentile) Weight(growth percentile) 132/65 88 97.2 °F (36.2 °C) (Oral) 20 5' 2\" (1.575 m) 226 lb 11.2 oz (102.8 kg) LMP SpO2 Breastfeeding? BMI OB Status Smoking Status 06/15/2018 98% No 41.46 kg/m2 Having regular periods Never Smoker BMI and BSA Data Body Mass Index Body Surface Area  
 41.46 kg/m 2 2.12 m 2 Preferred Pharmacy Pharmacy Name Phone [facility] [phone] Your Updated Medication List  
  
   
This list is accurate as of 6/27/18 10:38 AM.  Always use your most recent med list.  
  
  
  
  
 albuterol 90 mcg/actuation inhaler Commonly known as:  PROVENTIL HFA, VENTOLIN HFA, PROAIR HFA Take 2 Puffs by inhalation every four (4) hours as needed for Wheezing. bacitracin zinc ointment Commonly known as:  BACITRACIN Apply  to affected area two (2) times a day. cephALEXin 500 mg capsule Commonly known as:  Nadine Luiz Take 1 Cap by mouth two (2) times a day for 7 days. * HUMALOG PEN SC  
by SubCUTAneous route. * HumaLOG KwikPen Insulin 100 unit/mL kwikpen Generic drug:  insulin lispro INJECT 30 UNITS UNDER THE SKIN TID AT MEALS AS INSTRUCTED  
  
 ibuprofen 600 mg tablet Commonly known as:  MOTRIN Take 1 Tab by mouth every six (6) hours as needed for Pain. naproxen 500 mg tablet Commonly known as:  NAPROSYN Take 1 Tab by mouth every twelve (12) hours as needed for Pain. NOVOFINE 30 30 gauge x 1/3\" Generic drug:  Insulin Needles (Disposable) USE TO INJECT INSULIN TID ONETOUCH VERIO strip Generic drug:  glucose blood VI test strips U TO TEST BLOOD SUGAR 8 TIMES D  
  
 pantoprazole 40 mg tablet Commonly known as:  PROTONIX  
TAKE 1 TABLET BY MOUTH EVERY DAY PRENATAL MULTIVIT WITH IRON PO Take  by mouth. TOUJEO SOLOSTAR U-300 INSULIN 300 unit/mL (1.5 mL) Inpn Generic drug:  insulin glargine  
  
 triamcinolone acetonide 0.5 % ointment Commonly known as:  KENALOG Apply  to affected area two (2) times a day. use thin layer * Notice: This list has 2 medication(s) that are the same as other medications prescribed for you. Read the directions carefully, and ask your doctor or other care provider to review them with you. Prescriptions Sent to Pharmacy Refills  
 bacitracin zinc (BACITRACIN) ointment 0 Sig: Apply  to affected area two (2) times a day. Class: Normal  
 Pharmacy: [facility] Ph #: [phone] Route: Topical  
 cephALEXin (KEFLEX) 500 mg capsule 0 Sig: Take 1 Cap by mouth two (2) times a day for 7 days. Class: Normal  
 Pharmacy: [facility] [address] Ph #: [phone] Route: Oral  
  
Introducing [facility] & [facility]! Dear Viviana Dillon: Thank you for requesting a Kivo account. Our records indicate that you already have an active Kivo account. You can access your account anytime at [email or web address] Did you know that you can access your hospital and ER discharge instructions at any time in Kivo? You can also review all of your test results from your hospital stay or ER visit. Additional Information If you have questions, please visit the Frequently Asked Questions section of the Kivo website at [email or web address] Remember, Kivo is NOT to be used for urgent needs. For medical emergencies, dial 911. Now available from your iPhone and Android! Please provide this summary of care documentation to your next provider. Your primary care clinician is listed as Cadence Ortiz. If you have any questions after today's visit, please call [phone].

## 2018-06-27 NOTE — PROGRESS NOTES
Chief Complaint   Patient presents with    Rash     rash behind ears     1. Have you been to the ER, urgent care clinic since your last visit? Hospitalized since your last visit? Yes, Urgent Care    2. Have you seen or consulted any other health care providers outside of the [address] [place] [address] since your last visit? Include any pap smears or colon screening.  No

## 2018-09-19 DIAGNOSIS — K21.9 GASTROESOPHAGEAL REFLUX DISEASE WITHOUT ESOPHAGITIS: ICD-10-CM

## 2018-09-19 RX ORDER — PANTOPRAZOLE SODIUM 40 MG/1
TABLET, DELAYED RELEASE ORAL
Qty: 30 TAB | Refills: 3 | Status: SHIPPED | OUTPATIENT
Start: 2018-09-19 | End: 2019-01-28 | Stop reason: SDUPTHER

## 2019-02-08 ENCOUNTER — OFFICE VISIT (OUTPATIENT)
Dept: INTERNAL MEDICINE CLINIC | Age: 39
End: 2019-02-08

## 2019-02-08 VITALS
HEART RATE: 96 BPM | RESPIRATION RATE: 18 BRPM | SYSTOLIC BLOOD PRESSURE: 128 MMHG | BODY MASS INDEX: 41.73 KG/M2 | DIASTOLIC BLOOD PRESSURE: 82 MMHG | OXYGEN SATURATION: 97 % | HEIGHT: 62 IN | TEMPERATURE: 97.6 F | WEIGHT: 226.8 LBS

## 2019-02-08 DIAGNOSIS — E66.9 OBESITY (BMI 30-39.9): ICD-10-CM

## 2019-02-08 DIAGNOSIS — R23.8 SKIN IRRITATION: Primary | ICD-10-CM

## 2019-02-08 RX ORDER — INSULIN ASPART INJECTION 100 [IU]/ML
10 INJECTION, SOLUTION SUBCUTANEOUS
COMMUNITY
Start: 2019-02-02

## 2019-02-08 RX ORDER — CLOTRIMAZOLE AND BETAMETHASONE DIPROPIONATE 10; .64 MG/G; MG/G
CREAM TOPICAL 2 TIMES DAILY
Qty: 15 G | Refills: 0 | Status: SHIPPED | OUTPATIENT
Start: 2019-02-08 | End: 2022-07-28

## 2019-02-08 RX ORDER — ZINC GLUCONATE 50 MG
1 TABLET ORAL
COMMUNITY

## 2019-02-08 NOTE — PROGRESS NOTES
Subjective:     Chief Complaint   Patient presents with    Rash     lower stomach region has had rash since Monday. History of Present Illness    Christiano Villar is a 34y.o. year old female who presents for evaluation. She arrives today complaining about a rash to her lower abdomen. She denies any pain or irritation. She states she put steroid cream on it and it has improved. She denies any other new complaints at this time. She is requesting to have her cholesterol checked. She states she recently had it checked with endocrinology but she wasn't fasting and her LDL was slightly elevated. Last A1c was 6.6. No other new complaints at this time. NAD. No CP, SOB, GI, or  symptoms. Reviewed medications, recent lab work and imaging with patient. Pt reports compliance with medications. Current Outpatient Medications on File Prior to Visit   Medication Sig Dispense Refill    FIASP FLEXTOUCH U-100 INSULIN 100 unit/mL (3 mL) inpn       Magnesium Oxide 500 mg cap Take  by mouth.  pantoprazole (PROTONIX) 40 mg tablet take 1 tablet by mouth once daily 90 Tab 4    naproxen (NAPROSYN) 500 mg tablet Take 1 Tab by mouth every twelve (12) hours as needed for Pain. 20 Tab 0    albuterol (PROVENTIL HFA, VENTOLIN HFA, PROAIR HFA) 90 mcg/actuation inhaler Take 2 Puffs by inhalation every four (4) hours as needed for Wheezing. 1 Inhaler 1    TOUJEO SOLOSTAR 300 unit/mL (1.5 mL) inpn       NOVOFINE 30 30 gauge x 1/3\" USE TO INJECT INSULIN TID  2    ONETOUCH VERIO strip U TO TEST BLOOD SUGAR 8 TIMES D  4    PNV #94/INQZSEU FUM/FOLIC ACID (PRENATAL MULTIVIT WITH IRON PO) Take  by mouth.  bacitracin zinc (BACITRACIN) ointment Apply  to affected area two (2) times a day. 15 g 0    HUMALOG KWIKPEN INSULIN 100 unit/mL kwikpen INJECT 30 UNITS UNDER THE SKIN TID AT MEALS AS INSTRUCTED  4    ibuprofen (MOTRIN) 600 mg tablet Take 1 Tab by mouth every six (6) hours as needed for Pain.  30 Tab 0    INSULIN LISPRO (HUMALOG PEN SC) by SubCUTAneous route. No current facility-administered medications on file prior to visit. Allergies   Allergen Reactions    Aspirin Itching    Orange Swelling    Sulfa (Sulfonamide Antibiotics) Nausea and Vomiting    Sudafed [Pseudoephedrine Hcl] Other (comments)     Severe head ache      Past Medical History:   Diagnosis Date    Diabetes ([address]     Gastrointestinal disorder     GERD    GERD (gastroesophageal reflux disease)     Thyroid disease       Past Surgical History:   Procedure Laterality Date    HX  SECTION      HX CHOLECYSTECTOMY      HX HEENT      eye surgery    HX LIPOMA RESECTION      HX ORTHOPAEDIC      Carpul jesus manuel    HX OTHER SURGICAL      lipoma removal      Social History     Tobacco Use    Smoking status: Never Smoker    Smokeless tobacco: Never Used   Substance Use Topics    Alcohol use: No     Alcohol/week: 0.0 oz    Drug use: No      Family History   Problem Relation Age of Onset    Thyroid Disease Mother     Heart Disease Father     Cancer Father 61        Colon cancer    Alcohol abuse Father     Cancer Paternal Aunt         Breast cancer    Arthritis-rheumatoid Paternal Aunt     Cancer Paternal Aunt         breast cancer        Objective:     Vitals:    19 1014   BP: 128/82   Pulse: 96   Resp: 18   Temp: 97.6 °F (36.4 °C)   TempSrc: Oral   SpO2: 97%   Weight: 226 lb 12.8 oz (102.9 kg)   Height: 5' 2\" (1.575 m)       Review of Systems   Constitutional: Negative. HENT: Negative. Eyes: Negative. Respiratory: Negative. Cardiovascular: Negative. Gastrointestinal: Negative. Genitourinary: Negative. Musculoskeletal: Negative. Skin: Positive for rash. Neurological: Negative. Psychiatric/Behavioral: Negative. Physical Exam   Constitutional: She is oriented to person, place, and time. She appears well-developed and well-nourished. No distress.    Well-appearing morbidly obese  female. NAD   HENT:   Head: Normocephalic and atraumatic. Eyes: Conjunctivae and EOM are normal. Pupils are equal, round, and reactive to light. Neck: Normal range of motion. Neck supple. Cardiovascular: Normal rate, regular rhythm and normal heart sounds. Pulmonary/Chest: Effort normal and breath sounds normal. No respiratory distress. She has no wheezes. Abdominal: She exhibits no distension. Musculoskeletal: Normal range of motion. She exhibits no edema, tenderness or deformity. Neurological: She is alert and oriented to person, place, and time. Skin: Skin is warm and dry. Rash (three small red circular macular areas to left lower abdomen. No drainage. Non tender ) noted. She is not diaphoretic. No erythema. No pallor. Psychiatric: She has a normal mood and affect. Her behavior is normal.   Nursing note and vitals reviewed. Assessment/ Plan:   Diagnoses and all orders for this visit:    1. Skin irritation/possible ring worm? Will order  -     clotrimazole-betamethasone (LOTRISONE) topical cream; Apply  to affected area two (2) times a day. 2. Obesity (BMI 30-39. 9)   Will order  -     LIPID PANEL    Patient's plan of care has been reviewed with them. Patient and/or family have verbally conveyed their understanding and agreement of the patient's signs, symptoms, diagnosis, treatment and prognosis and additionally agree to follow up as recommended or return to [facility] should their condition change prior to follow-up. Discharge instructions have also been provided to the patient with some educational information regarding their diagnosis as well a list of reasons why they would want to return to the office prior to their follow-up appointment should their condition change. Follow-up in 6 months.

## 2019-02-08 NOTE — PROGRESS NOTES
Pretty Macario is a 34 y.o. female     Chief Complaint   Patient presents with    Rash     lower stomach region has had rash since Monday. Visit Vitals  /82 (BP 1 Location: Left arm, BP Patient Position: Sitting)   Pulse 96   Temp 97.6 °F (36.4 °C) (Oral)   Resp 18   Ht 5' 2\" (1.575 m)   Wt 226 lb 12.8 oz (102.9 kg)   SpO2 97%   BMI 41.48 kg/m²       Health Maintenance Due   Topic Date Due    LIPID PANEL Q1  1978    EYE EXAM RETINAL OR DILATED  09/29/1988    Pneumococcal 19-64 Medium Risk (1 of 1 - PPSV23) 09/29/1997    DTaP/Tdap/Td series (1 - Tdap) 09/29/1999    PAP AKA CERVICAL CYTOLOGY  09/29/1999    HEMOGLOBIN A1C Q6M  05/26/2012    MICROALBUMIN Q1  10/09/2014    Influenza Age 9 to Adult  08/01/2018       1. Have you been to the ER, urgent care clinic since your last visit? Hospitalized since your last visit? No    2. Have you seen or consulted any other health care providers outside of the [address] [place] [address] since your last visit? Include any pap smears or colon screening.  No

## 2019-02-12 ENCOUNTER — TELEPHONE (OUTPATIENT)
Dept: INTERNAL MEDICINE CLINIC | Age: 39
End: 2019-02-12

## 2019-02-12 ENCOUNTER — OFFICE VISIT (OUTPATIENT)
Dept: URGENT CARE | Age: 39
End: 2019-02-12

## 2019-02-12 VITALS
HEIGHT: 62 IN | OXYGEN SATURATION: 98 % | BODY MASS INDEX: 41.96 KG/M2 | WEIGHT: 228 LBS | TEMPERATURE: 98.3 F | HEART RATE: 91 BPM | RESPIRATION RATE: 16 BRPM | DIASTOLIC BLOOD PRESSURE: 67 MMHG | SYSTOLIC BLOOD PRESSURE: 130 MMHG

## 2019-02-12 DIAGNOSIS — J02.9 SORE THROAT: ICD-10-CM

## 2019-02-12 DIAGNOSIS — J03.90 TONSILLITIS: Primary | ICD-10-CM

## 2019-02-12 LAB
GLUCOSE POC: 94 MG/DL
S PYO AG THROAT QL: NEGATIVE
VALID INTERNAL CONTROL?: YES

## 2019-02-12 RX ORDER — AMOXICILLIN AND CLAVULANATE POTASSIUM 875; 125 MG/1; MG/1
1 TABLET, FILM COATED ORAL EVERY 12 HOURS
Qty: 20 TAB | Refills: 0 | Status: SHIPPED | OUTPATIENT
Start: 2019-02-12 | End: 2019-02-22

## 2019-02-12 NOTE — PROGRESS NOTES
33 yo female presents to  with cc of sore throat for past 3-4 days. She is accompanied by her children, one of which is + for strep throat. Her PMH is significant for type I diabetes, which she manages under the care of an endocrinologist and with long acting and short acting sliding scale insulin. She reports that her blood sugars have been more variable than previously during this last few days, but that she has sufficient sensitivity and control over her glucose with her sliding scale. She has not been hypoglycemic. The history is provided by the patient. Sore Throat    The history is provided by the patient. This is a new problem. The current episode started more than 2 days ago. The problem has been gradually worsening. There has been no fever. Associated symptoms include congestion, ear pain, headaches, swollen glands and cough. Pertinent negatives include no drooling, no shortness of breath and no trouble swallowing. She has had exposure to strep. She has tried acetaminophen for the symptoms. The treatment provided mild relief.         Past Medical History:   Diagnosis Date    Diabetes ([address]     Gastrointestinal disorder     GERD    GERD (gastroesophageal reflux disease)     Thyroid disease         Past Surgical History:   Procedure Laterality Date    HX  SECTION      HX CHOLECYSTECTOMY      HX HEENT      eye surgery    HX LIPOMA RESECTION      HX ORTHOPAEDIC      Carpul jesus manuel    HX OTHER SURGICAL      lipoma removal         Family History   Problem Relation Age of Onset    Thyroid Disease Mother     Heart Disease Father     Cancer Father 61        Colon cancer    Alcohol abuse Father     Cancer Paternal Aunt         Breast cancer    Arthritis-rheumatoid Paternal Aunt     Cancer Paternal Aunt         breast cancer        Social History     Socioeconomic History    Marital status:      Spouse name: Not on file    Number of children: Not on file    Years of education: Not on file    Highest education level: Not on file   Social Needs    Financial resource strain: Not on file    Food insecurity - worry: Not on file    Food insecurity - inability: Not on file    Transportation needs - medical: Not on file   Trufa needs - non-medical: Not on file   Occupational History     Employer: self employed   Tobacco Use    Smoking status: Never Smoker    Smokeless tobacco: Never Used   Substance and Sexual Activity    Alcohol use: No     Alcohol/week: 0.0 oz    Drug use: No    Sexual activity: Yes     Partners: Male     Birth control/protection: Condom     Comment:  has 2 children   Other Topics Concern    Not on file   Social History Narrative    Not on file                ALLERGIES: Aspirin; Orange; Sulfa (sulfonamide antibiotics); and Sudafed [pseudoephedrine hcl]    Review of Systems   Constitutional: Positive for fatigue. Negative for chills and fever. HENT: Positive for congestion, ear pain, sinus pressure, sinus pain and sore throat. Negative for dental problem, drooling, mouth sores, trouble swallowing and voice change. Respiratory: Positive for cough. Negative for shortness of breath. Cardiovascular: Negative. Gastrointestinal: Negative. Endocrine: Negative. Genitourinary: Negative. Musculoskeletal: Negative. Allergic/Immunologic: Positive for environmental allergies and food allergies. Neurological: Positive for headaches. Vitals:    02/12/19 1533   BP: 130/67   Pulse: 91   Resp: 16   Temp: 98.3 °F (36.8 °C)   SpO2: 98%   Weight: 228 lb (103.4 kg)   Height: 5' 2\" (1.575 m)       Physical Exam   Constitutional: She appears well-developed and well-nourished. She is cooperative. No distress. HENT:   Head: Normocephalic. Right Ear: Hearing, external ear and ear canal normal. A middle ear effusion is present.    Left Ear: Hearing, external ear and ear canal normal. A middle ear effusion is present. Nose: Mucosal edema and rhinorrhea present. Right sinus exhibits no maxillary sinus tenderness and no frontal sinus tenderness. Left sinus exhibits no maxillary sinus tenderness and no frontal sinus tenderness. Mouth/Throat: No oropharyngeal exudate or tonsillar abscesses. Neck: Normal range of motion. Neck supple. Cardiovascular: Normal rate and regular rhythm. Pulmonary/Chest: Effort normal and breath sounds normal.   Abdominal: Soft. Neurological: She is alert. Skin: She is not diaphoretic. Nursing note and vitals reviewed. MDM     Differential Diagnosis; Clinical Impression; Plan: Tonsillitis  Orders Placed This Encounter      amoxicillin-clavulanate (AUGMENTIN) 875-125 mg per tablet          Sig: Take 1 Tab by mouth every twelve (12) hours for 10 days. Dispense:  20 Tab          Refill:  0    Reviewed symptomatic management with tylenol. Discussed with patient hand hygiene, encouraged hydration of sugar free fluids. She is diabetic and reports that her glucoses have been less regularly controlled over the last few days. She does report sufficient management of her diabetes with a sliding scale of short acting insulin. The patients condition was discussed with the patient and they understand. The patient is to follow up with PCP. If signs and symptoms become worse the pt is to go to the ER. The patient is to take medications as prescribed. Recent Results (from the past 12 hour(s))  -AMB POC RAPID STREP A  Collection Time: 02/12/19  3:37 PM       Result                      Value             Ref Range           VALID INTERNAL CONTROL*     Yes                                   Group A Strep Ag            Negative          Negative         Throat Culture send out- results will return in 1-2 days expected.            Procedures

## 2019-02-12 NOTE — TELEPHONE ENCOUNTER
Pt called with an update on her condition from last visit. Pt advised that her throat is starting to hurt and wants to know what  recommends?

## 2019-02-12 NOTE — PATIENT INSTRUCTIONS

## 2019-02-12 NOTE — TELEPHONE ENCOUNTER
Call placed to pt and she states that x 2 days she has had a sore throat and dry cough, denies nasal congestion, fever, body aches and chills.  Writer conferred with provider and advise pt to treat the sx and if pt sx were to worsen or continue to call the office and schedule an appointment, pt voiced understanding

## 2019-02-12 NOTE — PROGRESS NOTES
HPI  
 
Past Medical History:  
Diagnosis Date  Diabetes ([facility] [address]  Gastrointestinal disorder GERD  GERD (gastroesophageal reflux disease)  Thyroid disease Past Surgical History:  
Procedure Laterality Date  HX  SECTION    HX CHOLECYSTECTOMY    HX HEENT    
 eye surgery [address] RESECTION    HX ORTHOPAEDIC [place]  HX OTHER SURGICAL    
 lipoma removal  
   
 
Family History Problem Relation Age of Onset  Thyroid Disease Mother  Heart Disease Father  Cancer Father 61 Colon cancer  Alcohol abuse Father  Cancer Paternal Aunt Breast cancer  Arthritis-rheumatoid Paternal Aunt  Cancer Paternal Aunt   
     breast cancer Social History Socioeconomic History  Marital status:  Spouse name: Not on file  Number of children: Not on file  Years of education: Not on file  Highest education level: Not on file Social Needs  Financial resource strain: Not on file  Food insecurity - worry: Not on file  Food insecurity - inability: Not on file  Transportation needs - medical: Not on file  Transportation needs - non-medical: Not on file Occupational History Employer: self employed Tobacco Use  Smoking status: Never Smoker  Smokeless tobacco: Never Used Substance and Sexual Activity  Alcohol use: No  
  Alcohol/week: 0.0 oz  Drug use: No  
 Sexual activity: Yes  
  Partners: Male Birth control/protection: Condom Comment:  has 2 children Other Topics Concern  Not on file Social History Narrative  Not on file ALLERGIES: Aspirin; Orange; Sulfa (sulfonamide antibiotics); and Sudafed [pseudoephedrine hcl] Review of Systems Vitals:  
 19 1533 BP: 130/67 Pulse: 91  
Resp: 16 Temp: 98.3 °F (36.8 °C) SpO2: 98% Weight: 228 lb (103.4 kg) Height: 5' 2\" (1.575 m) Physical Exam 
 
MDM 
 
 Procedures

## 2019-02-16 LAB
CHOLEST SERPL-MCNC: 191 MG/DL (ref 100–199)
HDLC SERPL-MCNC: 55 MG/DL
INTERPRETATION, 910389: NORMAL
LDLC SERPL CALC-MCNC: 121 MG/DL (ref 0–99)
TRIGL SERPL-MCNC: 77 MG/DL (ref 0–149)
VLDLC SERPL CALC-MCNC: 15 MG/DL (ref 5–40)

## 2019-02-18 NOTE — PROGRESS NOTES
1.  LDL slightly elevated. Recommend that patient watch diet for fatty foods and exercise as tolerated.

## 2020-03-27 ENCOUNTER — HOSPITAL ENCOUNTER (OUTPATIENT)
Dept: MAMMOGRAPHY | Age: 40
Discharge: HOME | End: 2020-03-27
Attending: NURSE PRACTITIONER
Payer: MEDICAID

## 2020-03-27 DIAGNOSIS — R59.1 LYMPHADENOPATHY: ICD-10-CM

## 2020-03-27 DIAGNOSIS — R59.9 SWELLING OF LYMPH NODES: ICD-10-CM

## 2020-03-27 PROCEDURE — 76882 US LMTD JT/FCL EVL NVASC XTR: CPT

## 2020-03-27 PROCEDURE — 77062 BREAST TOMOSYNTHESIS BI: CPT

## 2021-08-14 ENCOUNTER — TRANSCRIBE ORDER (OUTPATIENT)
Dept: SCHEDULING | Age: 41
End: 2021-08-14

## 2021-08-14 DIAGNOSIS — N89.8 VAGINAL CYST: ICD-10-CM

## 2021-08-14 DIAGNOSIS — R19.00 ABDOMINAL WALL SWELLING: Primary | ICD-10-CM

## 2022-03-19 PROBLEM — E66.01 OBESITY, MORBID (HCC): Status: ACTIVE | Noted: 2018-02-21

## 2022-06-10 NOTE — PATIENT INSTRUCTIONS
Occupational Therapy                              [facility] ARU OCCUPATIONAL THERAPY EVALUATION    Chart Review:  Past Medical History:   Diagnosis Date    Diabetes mellitus ([address]     H/O cardiac catheterization 05/18/2021    no significant CAD in main vessels, has severe stenosis in small  branch diagonal vessel    H/O cardiovascular stress test 3/22/18,03/30/2017    ECG portion of the stress test is negative for ischemia EF >70% Normal stress myocardial perfusion.  H/O cardiovascular stress test 04/07/2021    abnormal stress    H/O Doppler ultrasound (Abdomimal Aorta) 03/29/2017    No evidence of abdominal aortic aneurysm.  H/O echocardiogram 07/02/2014    Normal left ventricular size, wall motion and systolic function. Mildle elevated pulmonary artery systolic pressure. Mild MR and TR and trace AR EF 55 to 60%    Hx of echocardiogram 03/29/2017    EF 55-60%. Grade I diastolic dysfunction. Mildly dilated left atrium. No evidence of pericardial effusion.      Hyperlipidemia     Hypertension     Sleep apnea     Thyroid disease      Past Surgical History:   Procedure Laterality Date    BREAST BIOPSY Right     approx age 74, benign    CATARACT REMOVAL      CHOLECYSTECTOMY      COLONOSCOPY N/A 5/25/2022    COLONOSCOPY POLYPECTOMY SNARE/COLD BIOPSY performed by Yaw Kramer MD at [address] [facility] N/A 5/27/2022    BOWEL RESECTION RIGHT HEMICOLECTOMY LAPAROSCOPIC ROBOTIC XI performed by Opal Landin MD at [address] [facility]      eye lift     Social History:  Social/Functional History  Lives With: Spouse  Type of Home: House  Home Layout: One level  Home Access: Stairs to enter with rails  Entrance Stairs - Number of Steps: 2 to porch, 1 into house  Entrance Stairs - Rails: Both  Bathroom Shower/Tub: Walk-in shower  Bathroom Toilet: Standard  Bathroom Equipment: Built-in shower seat,Grab bars in [employer] Paronychia: Care Instructions  Your Care Instructions  Paronychia (say \"rnjq-io-ZA-jhonatan-uh\") is an infection of the skin around a fingernail or toenail. It happens when germs enter through a break in the skin. The doctor may have made a small cut in the infected area to drain the pus. Most cases of paronychia improve in a few days. But watch your symptoms and follow your doctor's advice. Though rare, a mild case can turn into something more serious and infect your entire finger or toe. Also, it is possible for an infection to return. Follow-up care is a key part of your treatment and safety. Be sure to make and go to all appointments, and call your doctor if you are having problems. It's also a good idea to know your test results and keep a list of the medicines you take. How can you care for yourself at home? · If your doctor told you how to care for your infected nail, follow the doctor's instructions. If you did not get instructions, follow this general advice:  ¨ Wash the area with clean water 2 times a day. Don't use hydrogen peroxide or alcohol, which can slow healing. ¨ You may cover the area with a thin layer of petroleum jelly, such as Vaseline, and a nonstick bandage. ¨ Apply more petroleum jelly and replace the bandage as needed. · If your doctor prescribed antibiotics, take them as directed. Do not stop taking them just because you feel better. You need to take the full course of antibiotics. · Take an over-the-counter pain medicine, such as acetaminophen (Tylenol), ibuprofen (Advil, Motrin), or naproxen (Aleve). Read and follow all instructions on the label. · Do not take two or more pain medicines at the same time unless the doctor told you to. Many pain medicines have acetaminophen, which is Tylenol. Too much acetaminophen (Tylenol) can be harmful. · Prop up the toe or finger so that it is higher than the level of your heart. This will help with pain and swelling. · Apply heat.  Put a warm water bottle, heating pad set on low, or warm cloth on your finger or toe. Do not go to sleep with a heating pad on your skin. · Soak the area in warm water twice a day for 15 minutes each time. After soaking, dry the area well and apply a thin layer of petroleum jelly, such as Vaseline. Put on a new bandage. When should you call for help? Call your doctor now or seek immediate medical care if:  ? · You have signs of new or worsening infection, such as:  ¨ Increased pain, swelling, warmth, or redness. ¨ Red streaks leading from the infected skin. ¨ Pus draining from the area. ¨ A fever. ? Watch closely for changes in your health, and be sure to contact your doctor if:  ? · You do not get better as expected. Where can you learn more? Go to http://shira-obed.info/. Enter C435 in the search box to learn more about \"Paronychia: Care Instructions. \"  Current as of: October 13, 2016  Content Version: 11.4  © 1634-1422 [employer]. Care instructions adapted under license by [employer] (which disclaims liability or warranty for this information). If you have questions about a medical condition or this instruction, always ask your healthcare professional. [address] any warranty or liability for your use of this information. (degrees)  Right Hand AROM: WFL    Strength:    LUE Strength  L Hand General: 4/5  LUE Strength Comment: 4/5 grossly  RUE Strength  R Hand General: 4/5  RUE Strength Comment: 4/5 grossly    Quality of Movement: Tone RUE  RUE Tone: Normotonic  Tone LUE  LUE Tone: Normotonic  Coordination  Movements Are Fluid And Coordinated: Yes  Coordination and Movement Description: Fine motor impairments       Sensation:    Sensation  Overall Sensation Status: Impaired (Pt with numbness to B hands, previous CT release in R hand and current CTS in L hand.)     ADLs:  Eating: Eating  Assistance Needed: Independent  Comment: Pt able to open packages/containers to self feed IND. CARE Score: 6  Discharge Goal: Independent       Oral Hygiene: Oral Hygiene  Assistance Needed: Independent  Comment: Pt completed oral care Mod I seated. CARE Score: 6  Discharge Goal: Independent    UB/LB Bathing: Shower/Bathe Self  Assistance Needed: Substantial/maximal assistance  Comment: Pt required assist with rear washing and Max A for balance to remain in stance. CARE Score: 2  Discharge Goal: Supervision or touching assistance    UB Dressing: Upper Body Dressing  Assistance Needed: Partial/moderate assistance  Comment: Pt required assist pulling shirt down in the back and adjusting sleeves. CARE Score: 3  Discharge Goal: Independent         LB Dressing:   Lower Body Dressing  Assistance Needed: Substantial/maximal assistance  Comment: Pt required assist threading depends onto L foot, however Pt able to thread BLEs into pants. Pt required Max A as Pt required therapist pull pants up from below knee level while providing assist for balance to remain in stance. CARE Score: 2  Discharge Goal: Supervision or touching assistance    Donning and [place] Footwear: Putting On/Taking Off Footwear  Assistance Needed: Supervision or touching assistance  Comment: Pt able to doff/don socks using figure four method. Pt without shoes on ARU at this time.   CARE Score: 4  Discharge Goal: Independent      Toiletin [address] needed: Substantial/maximal assistance  Comment: Pt lost balance during clothing management at toilet using grab bars, requiring Max A to correct and assistance managing depends. CARE Score: 2  Discharge Goal: Supervision or touching assistance      Bed Mobility:    Bed mobility  Supine to Sit: Minimal assistance    Transfers:    Transfers  Stand Pivot Transfers: Minimal assistance  Sit to stand: Minimal assistance,Moderate assistance (Ranged from Min-Mod A.)  Stand to sit: Minimal assistance           Toilet Transfer  Assistance needed: Partial/moderate assistance  Comment: Mod A to perform stand pivot transfer using 2WW and grab bars d/t loss of balance. Pt required Max safety cues for correct positioning of hands/body/2WW. CARE Score: 3  Discharge Goal: Supervision or touching assistance    Functional Mobility:    Balance  Sitting Balance: Stand by assistance  Standing Balance: Maximum assistance (Occasional Max A to correct R lateral lean.)  Standing Balance  Time: < 1 min  Activity: ADL  Functional Mobility  Functional - Mobility Device: Rolling Walker  Activity: To/from bathroom  Assist Level: Minimal assistance  Functional Mobility Comments: Pt consistently losing balance requiring Min A to correct. Cognition:  Cognition  Overall Cognitive Status: Exceptions  Arousal/Alertness: Appropriate responses to stimuli  Following Commands:  Follows one step commands with increased time  Safety Judgement: Decreased awareness of need for assistance,Decreased awareness of need for safety  Problem Solving: Decreased awareness of errors,Assistance required to identify errors made,Assistance required to generate solutions  Insights: Decreased awareness of deficits  Initiation: Requires cues for some  Sequencing: Requires cues for some    Perception:  Perception  Overall Perceptual Status: WFL      Assessment:     Performance deficits / Impairments: Decreased functional mobility ,Decreased safe awareness,Decreased balance,Decreased coordination,Decreased ADL status,Decreased cognition,Decreased posture,Decreased endurance,Decreased high-level IADLs,Decreased strength,Decreased sensation,Decreased fine motor control    The patient is an 78year old female admitted onto ARU after hospitalization for complaints of abdominal pain with resulting cecal mass. Pt underwent right hemicolectomy on 5/27 and remains with CLAUDIO drain and full liquid diet. Oncology pathology revealed that Pt has stage IIIB terminal ileum adenocarcinoma. Option of adjuvant chemotherapy discussed with Pt and Pt would begin treatment s/p ARU admission. PTA, Pt living with spouse and IND with ADLs/IADLs as well as functional mobility without a device. Today, Pt demonstrated poor static/dynamic standing balance with right lateral lean, requiring Max A during LB dressing/bathing and toileting d/t this deficit. Pt completed functional mobility/transfers with 2WW requiring Mod A. The QI, MMT, and ROM standardized assessments were used this date to determine the above performance deficits, which compromise pt's ability to safely complete ADLs/IADLs/mobility. Pt will benefit from ARU OT services to increase functional performance and return to PLOF. Decision Making: Medium Complexity  Clinical Presentation:  Pt presents to this ARU with deficits including functional balance/transfers/mobility, strength, endurance, FMC, and ADLs/IADLs. Patient education:   ARU procotol, Role of O.T., O.T. plan of care  []   Patient goal was established and reviewed in Rehabtracker with patient and/or family this date. REQUIRES OT FOLLOW-UP: Yes  Discharge Recommendations:  Home with spouse and increased supervision; [facility]  Equipment Recommendations:  None, Pt has built in shower seat with grab bars and a BSC.     Goals:     Short Term Goals  Time Frame for Short term goals: STGs=LTGs  Long Term Goals  Time Frame for Long term goals : 10-12 days or until d/c. Long Term Goal 1: Pt will complete grooming tasks c Mod I.  Long Term Goal 2: Pt will complete total body bathing c AE PRN and supervision. Long Term Goal 3: Pt will complete UB dressing c Mod I.  Long Term Goal 4: Pt will complete LB dressing c AE PRN and supervision. Long Term Goal 5: Pt will doff/don footwear c AE PRN and Mod I. Additional Goals?: Yes  Long Term Goal 6: Pt will complete toileting c supervision. Long Term Goal 7: Pt will perform functional transfers (bed, chair, toilet, shower) c DME PRN and supervision. Long Term Goal 8: Pt will perform therex/therax to facilitate an increase in strength/endurance/ax tolerance (c emphasis on static/dynamic standing balance/tolerance > 6 mins) c supervision. Long Term Goal 9: Pt will complete light home management tasks c supervision.     Plan:    Pt will be seen at least 60 minutes per day for a minimum of 5 days per week, plus group therapy as appropriate  Current Treatment Recommendations: Strengthening,Balance training,Functional mobility training,Endurance training,Safety education & training,Patient/Caregiver education & training,Equipment evaluation, education, & procurement,Positioning,Home management training,Self-Care / Aloma Chauncey re-education,Coordination training    OT Individual Minutes  Time In: 5134  Time Out: 7455  Minutes: 101                Number of Minutes/Billable Intervention      OT Evaluation 25   Therapeutic Exercise    ADL Self-care 76   Neuro Re-Ed    Therapeutic Activity    Group    Other:    TOTAL 101     Electronically signed by:    RANDELL Hill, OTR/L    6/10/2022, 3:11 PM

## 2022-07-27 ENCOUNTER — APPOINTMENT (OUTPATIENT)
Dept: CT IMAGING | Age: 42
End: 2022-07-27
Attending: FAMILY MEDICINE
Payer: MEDICAID

## 2022-07-27 ENCOUNTER — HOSPITAL ENCOUNTER (EMERGENCY)
Age: 42
Discharge: HOME | End: 2022-07-27
Attending: EMERGENCY MEDICINE | Admitting: EMERGENCY MEDICINE
Payer: MEDICAID

## 2022-07-27 VITALS
SYSTOLIC BLOOD PRESSURE: 117 MMHG | DIASTOLIC BLOOD PRESSURE: 66 MMHG | RESPIRATION RATE: 18 BRPM | TEMPERATURE: 98.6 F | OXYGEN SATURATION: 99 % | HEART RATE: 95 BPM

## 2022-07-27 DIAGNOSIS — R19.00 PELVIC MASS: Primary | ICD-10-CM

## 2022-07-27 LAB
ALBUMIN SERPL-MCNC: 3.3 G/DL (ref 3.5–5)
ALBUMIN/GLOB SERPL: 0.9 {RATIO} (ref 1.1–2.2)
ALP SERPL-CCNC: 81 U/L (ref 45–117)
ALT SERPL-CCNC: 14 U/L (ref 12–78)
ANION GAP SERPL CALC-SCNC: 8 MMOL/L (ref 5–15)
APPEARANCE UR: ABNORMAL
AST SERPL-CCNC: 16 U/L (ref 15–37)
BACTERIA URNS QL MICRO: NEGATIVE /HPF
BASOPHILS # BLD: 0 K/UL (ref 0–0.1)
BASOPHILS NFR BLD: 0 % (ref 0–1)
BILIRUB SERPL-MCNC: 0.3 MG/DL (ref 0.2–1)
BILIRUB UR QL: NEGATIVE
BUN SERPL-MCNC: 5 MG/DL (ref 6–20)
BUN/CREAT SERPL: 8 (ref 12–20)
CALCIUM SERPL-MCNC: 9.1 MG/DL (ref 8.5–10.1)
CANCER AG125 SERPL-ACNC: 73 U/ML (ref 1.5–35)
CEA SERPL-MCNC: 70.5 NG/ML
CHLORIDE SERPL-SCNC: 103 MMOL/L (ref 97–108)
CO2 SERPL-SCNC: 28 MMOL/L (ref 21–32)
COLOR UR: ABNORMAL
COMMENT, HOLDF: NORMAL
CREAT SERPL-MCNC: 0.61 MG/DL (ref 0.55–1.02)
DIFFERENTIAL METHOD BLD: NORMAL
EOSINOPHIL # BLD: 0.1 K/UL (ref 0–0.4)
EOSINOPHIL NFR BLD: 2 % (ref 0–7)
EPITH CASTS URNS QL MICRO: ABNORMAL /LPF
ERYTHROCYTE [DISTWIDTH] IN BLOOD BY AUTOMATED COUNT: 13.3 % (ref 11.5–14.5)
GLOBULIN SER CALC-MCNC: 3.5 G/DL (ref 2–4)
GLUCOSE SERPL-MCNC: 264 MG/DL (ref 65–100)
GLUCOSE UR STRIP.AUTO-MCNC: 100 MG/DL
HCG UR QL: NEGATIVE
HCT VFR BLD AUTO: 37.1 % (ref 35–47)
HGB BLD-MCNC: 12.2 G/DL (ref 11.5–16)
HGB UR QL STRIP: NEGATIVE
HYALINE CASTS URNS QL MICRO: ABNORMAL /LPF (ref 0–5)
IMM GRANULOCYTES # BLD AUTO: 0 K/UL (ref 0–0.04)
IMM GRANULOCYTES NFR BLD AUTO: 0 % (ref 0–0.5)
KETONES UR QL STRIP.AUTO: NEGATIVE MG/DL
LEUKOCYTE ESTERASE UR QL STRIP.AUTO: NEGATIVE
LIPASE SERPL-CCNC: 59 U/L (ref 73–393)
LYMPHOCYTES # BLD: 0.9 K/UL (ref 0.8–3.5)
LYMPHOCYTES NFR BLD: 20 % (ref 12–49)
MCH RBC QN AUTO: 29.3 PG (ref 26–34)
MCHC RBC AUTO-ENTMCNC: 32.9 G/DL (ref 30–36.5)
MCV RBC AUTO: 89 FL (ref 80–99)
MONOCYTES # BLD: 0.3 K/UL (ref 0–1)
MONOCYTES NFR BLD: 7 % (ref 5–13)
NEUTS SEG # BLD: 3.2 K/UL (ref 1.8–8)
NEUTS SEG NFR BLD: 71 % (ref 32–75)
NITRITE UR QL STRIP.AUTO: NEGATIVE
NRBC # BLD: 0 K/UL (ref 0–0.01)
NRBC BLD-RTO: 0 PER 100 WBC
PH UR STRIP: 8.5 [PH] (ref 5–8)
PLATELET # BLD AUTO: 230 K/UL (ref 150–400)
PMV BLD AUTO: 9.7 FL (ref 8.9–12.9)
POTASSIUM SERPL-SCNC: 3.6 MMOL/L (ref 3.5–5.1)
PROT SERPL-MCNC: 6.8 G/DL (ref 6.4–8.2)
PROT UR STRIP-MCNC: NEGATIVE MG/DL
RBC # BLD AUTO: 4.17 M/UL (ref 3.8–5.2)
RBC #/AREA URNS HPF: ABNORMAL /HPF (ref 0–5)
SAMPLES BEING HELD,HOLD: NORMAL
SODIUM SERPL-SCNC: 139 MMOL/L (ref 136–145)
SP GR UR REFRACTOMETRY: 1.01 (ref 1–1.03)
UA: UC IF INDICATED,UAUC: ABNORMAL
UROBILINOGEN UR QL STRIP.AUTO: 0.2 EU/DL (ref 0.2–1)
WBC # BLD AUTO: 4.5 K/UL (ref 3.6–11)
WBC URNS QL MICRO: ABNORMAL /HPF (ref 0–4)

## 2022-07-27 PROCEDURE — 36415 COLL VENOUS BLD VENIPUNCTURE: CPT

## 2022-07-27 PROCEDURE — 82378 CARCINOEMBRYONIC ANTIGEN: CPT

## 2022-07-27 PROCEDURE — 86301 IMMUNOASSAY TUMOR CA 19-9: CPT

## 2022-07-27 PROCEDURE — 74177 CT ABD & PELVIS W/CONTRAST: CPT

## 2022-07-27 PROCEDURE — 80053 COMPREHEN METABOLIC PANEL: CPT

## 2022-07-27 PROCEDURE — 85025 COMPLETE CBC W/AUTO DIFF WBC: CPT

## 2022-07-27 PROCEDURE — 99285 EMERGENCY DEPT VISIT HI MDM: CPT

## 2022-07-27 PROCEDURE — 83690 ASSAY OF LIPASE: CPT

## 2022-07-27 PROCEDURE — 81025 URINE PREGNANCY TEST: CPT

## 2022-07-27 PROCEDURE — 86304 IMMUNOASSAY TUMOR CA 125: CPT

## 2022-07-27 PROCEDURE — 74011000636 HC RX REV CODE- 636: Performed by: EMERGENCY MEDICINE

## 2022-07-27 PROCEDURE — 81001 URINALYSIS AUTO W/SCOPE: CPT

## 2022-07-27 RX ORDER — ONDANSETRON 2 MG/ML
4 INJECTION INTRAMUSCULAR; INTRAVENOUS
Status: DISCONTINUED | OUTPATIENT
Start: 2022-07-27 | End: 2022-07-28 | Stop reason: HOSPADM

## 2022-07-27 RX ORDER — MORPHINE SULFATE 4 MG/ML
4 INJECTION INTRAVENOUS
Status: DISCONTINUED | OUTPATIENT
Start: 2022-07-27 | End: 2022-07-28 | Stop reason: HOSPADM

## 2022-07-27 RX ADMIN — IOPAMIDOL 100 ML: 755 INJECTION, SOLUTION INTRAVENOUS at 16:49

## 2022-07-27 NOTE — ED TRIAGE NOTES
For the last month pt has had abdominal pain. Pain gets worse after eating. Pt only has a bowel movement every few days and then it is loose. Pt does have nausea but denies vomiting. Pt denies sob, chest pain, and fevers.

## 2022-07-27 NOTE — ED PROVIDER NOTES
Patient is a 75-year-old female with past medical history of GERD, diabetes, hypothyroidism presenting for evaluation of constipation and abdominal pain. Reports that she has been having issues with constipation for approximately 1 month. Notes that she goes several days without having a bowel movement, and has loose stool. She has tried Mylanta, probiotics, and MiraLAX at home without symptom relief. She states that MiraLAX caused severe gas related pain. She does have a history of GERD and takes Protonix daily, which does help with the symptoms. She reports that her last bowel movement today, but it was just a small amount of loose stool. Went to her primary care, who wanted her to come to the emergency department for CAT scan to rule out small bowel obstruction.   She does have an appointment with GI at the end of August.           Past Medical History:   Diagnosis Date    Diabetes ([address]     Gastrointestinal disorder     GERD    GERD (gastroesophageal reflux disease)     Thyroid disease        Past Surgical History:   Procedure Laterality Date    HX  SECTION      HX CHOLECYSTECTOMY      HX HEENT      eye surgery    HX LIPOMA RESECTION      HX ORTHOPAEDIC      Carpul jesus manuel    HX OTHER SURGICAL      lipoma removal         Family History:   Problem Relation Age of Onset    Thyroid Disease Mother     Heart Disease Father     Cancer Father 61        Colon cancer    Alcohol abuse Father     Breast Cancer Paternal Grandmother     Breast Cancer Other         great paternal aunt    Cancer Paternal Aunt         Breast cancer    Arthritis-rheumatoid Paternal Aunt     Breast Cancer Paternal Aunt     Cancer Paternal Aunt         breast cancer    Breast Cancer Paternal Aunt        Social History     Socioeconomic History    Marital status:      Spouse name: Not on file    Number of children: Not on file    Years of education: Not on file    Highest education level: Not on file   Occupational History     Employer: self employed   Tobacco Use    Smoking status: Never    Smokeless tobacco: Never   Substance and Sexual Activity    Alcohol use: No     Alcohol/week: 0.0 standard drinks    Drug use: No    Sexual activity: Yes     Partners: Male     Birth control/protection: Condom     Comment:  has 2 children   Other Topics Concern    Not on file   Social History Narrative    Not on file     Social Determinants of Health     Financial Resource Strain: Not on file   Food Insecurity: Not on file   Transportation Needs: Not on file   Physical Activity: Not on file   Stress: Not on file   Social Connections: Not on file   Intimate Partner Violence: Not on file   Housing Stability: Not on file         ALLERGIES: Aspirin, Orange, Sulfa (sulfonamide antibiotics), and Sudafed [pseudoephedrine hcl]    Review of Systems   Constitutional:  Negative for unexpected weight change. HENT:  Negative for congestion. Eyes:  Negative for visual disturbance. Respiratory:  Negative for cough, chest tightness and shortness of breath. Cardiovascular:  Negative for chest pain. Gastrointestinal:  Positive for abdominal pain, constipation and nausea. Negative for blood in stool and vomiting. Endocrine: Negative for polyuria. Genitourinary:  Negative for dysuria. Musculoskeletal:  Negative for back pain. Skin:  Negative for color change. Allergic/Immunologic: Negative for immunocompromised state. Neurological:  Negative for dizziness and headaches. Hematological:  Negative for adenopathy. Psychiatric/Behavioral:  Negative for agitation. Vitals:    07/27/22 1359   BP: 117/66   Pulse: 95   Resp: 18   Temp: 98.6 °F (37 °C)   SpO2: 99%            Physical Exam  Vitals and nursing note reviewed. Constitutional:       General: She is not in acute distress. Appearance: She is well-developed. She is obese. HENT:      Head: Atraumatic.    Cardiovascular:      Rate and Rhythm: Normal rate and regular rhythm. Heart sounds: Normal heart sounds. Pulmonary:      Effort: Pulmonary effort is normal.      Breath sounds: Normal breath sounds. Abdominal:      General: Abdomen is flat. Bowel sounds are normal.      Palpations: Abdomen is soft. Tenderness: There is generalized abdominal tenderness. There is no right CVA tenderness, left CVA tenderness, guarding or rebound. Hernia: No hernia is present. Skin:     General: Skin is warm and dry. Capillary Refill: Capillary refill takes less than 2 seconds. Neurological:      General: No focal deficit present. Mental Status: She is alert and oriented to person, place, and time. Psychiatric:         Mood and Affect: Mood normal.         Behavior: Behavior normal.        MDM  Number of Diagnoses or Management Options  Pelvic mass  Diagnosis management comments: Patient presenting with abdominal pain, bloating, possible constipation. Sent by PCP to rule out obstruction. Labs within normal limits. UA unremarkable. CT with evidence of large pelvic cystic mass, likely adnexal in origin. Offered patient admission for pain control and further work-up versus discharge home and outpatient follow-up. Patient prefers to be admitted. Spoke to Dr. Alma Boyd, who will admit patient after he is out of surgery. [address] [place], [place] [address]Dr. Waldo Ramon evaluated patient at bedside. At this time, patient has changed her mind and prefers to go home. She will follow-up in office on Friday and plan for surgical intervention on Monday. Declines wanting prescription for pain medication. Discussed my clinical impression(s), any labs and/or radiology results with the patient. I answered any questions and addressed any concerns. Discussed the importance of following up with their primary care physician and/or specialist(s). Discussed signs or symptoms that would warrant return back to the ER for further evaluation. The patient is agreeable with discharge.        Amount and/or Complexity of Data Reviewed  Clinical lab tests: ordered and reviewed  Tests in the radiology section of CPT®: ordered and reviewed    Patient Progress  Patient progress: stable         Procedures

## 2022-07-28 ENCOUNTER — OFFICE VISIT (OUTPATIENT)
Dept: GYNECOLOGY | Age: 42
End: 2022-07-28
Payer: MEDICAID

## 2022-07-28 VITALS
WEIGHT: 208.4 LBS | HEIGHT: 62 IN | BODY MASS INDEX: 38.35 KG/M2 | DIASTOLIC BLOOD PRESSURE: 73 MMHG | HEART RATE: 81 BPM | SYSTOLIC BLOOD PRESSURE: 108 MMHG

## 2022-07-28 DIAGNOSIS — R19.00 PELVIC MASS: Primary | ICD-10-CM

## 2022-07-28 LAB
COMMENT, HOLDF: NORMAL
SAMPLES BEING HELD,HOLD: NORMAL

## 2022-07-28 PROCEDURE — 99204 OFFICE O/P NEW MOD 45 MIN: CPT | Performed by: OBSTETRICS & GYNECOLOGY

## 2022-07-28 RX ORDER — METHIMAZOLE 10 MG/1
TABLET ORAL
COMMUNITY
Start: 2022-05-31 | End: 2022-07-28

## 2022-07-28 RX ORDER — METHIMAZOLE 10 MG/1
10 TABLET ORAL
COMMUNITY

## 2022-07-28 NOTE — PERIOP NOTES
PAT PREOP PHONE INTERVIEW COMPLETED WITH:     PATIENT ADVISED NOT TO EAT/DRINK ANYTHING PAST MIDNIGHT EVENING PRIOR TO SURGERY,  NOTHING TO EAT/DRINK MORNING OF SURGERY UNLESS SIP OF WATER TO SWALLOW MEDICATION;  LEAVE ALL VALUABLES AT HOME; DO BRING PICTURE ID, INSURANCE CARD AND ANY COPAY; WEAR COMFORTABLE CLOTHING;  NO PERFUMES, POWDERS, LOTIONS; NO ALCOHOL 24 HOURS BEFORE OR AFTER SURGERY;  WILL NEED TO BE DRIVEN HOME BY FAMILY OR FRIEND;  AVOID TAKING NSAIDS, ASPIRIN, FISH OIL, VITAMIN E OR GLUCOSAMINE/CHONDROITIN DURING THIS TIME PRIOR TO SURGERY;  MAY TAKE TYLENOL. INSTRUCTED TO REPORT  [address] BY SURGEON'S OFFICE. INSTRUCTION PROVIDED FOR CHG SOAP.        NOT VACCINATED WITH COVID 19.

## 2022-07-28 NOTE — CONSULTS
Patient briefly seen and evaluated. CT findings discussed. Explained that she will need surgery, but I would not be able to do it until Monday. She is OK with that and would like to go home. I will see her in the office on Friday for a pelvic exam and further discuss surgery. I will talk with her gyn, Dr. Brandi Cadet about a vaginal cyst that she has been following her for. We may need to address it at the same time. We will check tumor markers, including CA-125, CEA, and CA 19-9, before she goes home. An electronic signature was used to sign this note.     J Carlos Palacio MD  07/27/22   9:50 PM

## 2022-07-28 NOTE — PROGRESS NOTES
New Patient, Referred by [facility] ER, pt reports pelvic pain that is constant, she reports nausea and loose stools, no vomiting , no fever or chills    1. Have you been to the ER, urgent care clinic since your last visit? Hospitalized since your last visit? 2. Have you seen or consulted any other health care providers outside of the [address] [place] [address] since your last visit? Include any pap smears or colon screening.

## 2022-07-28 NOTE — PROGRESS NOTES
[address] [place] [address] [address], [address] [place]  P [phone]  F [phone]    Office Note  Patient ID:  Name:  Saulo Cagle  MRN:  [id number]  :  1980/41 y.o. Date:  2022      HISTORY OF PRESENT ILLNESS:  Saulo Cagle is a 35 y.o.  premenopausal female who is a new patient being seen for a large, multi-septated, pelvic mass. She was seen in the ER on the evening of 22 for complaints of abdominal pain and constipation. These issues had been ongoing for at least a month. She had seen her PCP about the symptoms and they had actually ordered a CT scan, but that had not been done. She also had an appointment with gastroenterology later in the month. A CT scan was done in the ER. This revealed a large pelvic cystic mass measuring 20.2 x 12.7 x 17.4 cm with multiple septations. There was no free fluid, carcinomatosis, or lymphadenopathy. I briefly saw her in the ER and reviewed her imaging results. She wanted to go home since I would not be able to operate for several days. I did have the ER draw tumor markers before she was discharged. Her CA-125 was mildly elevated at 73. Her CEA was normal.  Her CA 19-9 is still pending. She is followed by Dr. Odilia Dowling with [facility] for gynecologic care. She last saw her in 2021. Her pap smear that time was negative. Dr. Gabby Rausch has been following her for a large cyst of the left vaginal wall. She actually drained it by aspiration in the office at her last visit. The cyst remains and was noted on her CT.        ROS:   and GI review:  Negative  Cardiopulmonary review:  Negative   Musculoskeletal:  Negative    A comprehensive review of systems was negative except for that written in the History of Present Illness. , 10 point ROS      OB/GYN ROS/HX:  [id number]   section x 2      Problem List:  Patient Active Problem List    Diagnosis Date Noted    Obesity, morbid ([facility] 2018 ANGULO (dyspnea on exertion) 2016    Type 1 diabetes mellitus with complications ([facility]     Obesity (BMI 30-39.9) 2016    Pneumonia, organism unspecified(486) 2011    Gall bladder stones 2011     PMH:  Past Medical History:   Diagnosis Date    Diabetes ([facility]     Gastrointestinal disorder     GERD    GERD (gastroesophageal reflux disease)     Thyroid disease       PSH:  Past Surgical History:   Procedure Laterality Date    HX  SECTION      HX CHOLECYSTECTOMY      HX HEENT      eye surgery    HX LIPOMA RESECTION      HX ORTHOPAEDIC      Carpul jesus manuel    HX OTHER SURGICAL      lipoma removal      Social History:  Social History     Tobacco Use    Smoking status: Never    Smokeless tobacco: Never   Substance Use Topics    Alcohol use: No     Alcohol/week: 0.0 standard drinks      Family History:  Family History   Problem Relation Age of Onset    Thyroid Disease Mother     Heart Disease Father     Cancer Father 61        Colon cancer    Alcohol abuse Father     Breast Cancer Paternal Grandmother     Cancer Paternal Aunt         Breast cancer    Arthritis-rheumatoid Paternal Aunt     Breast Cancer Paternal Aunt     Cancer Paternal Aunt         breast cancer    Breast Cancer Paternal Aunt     Breast Cancer Other         great paternal aunt    Cancer Other         paternal aunt, ovarian cancer      Medications: (reviewed)  Current Outpatient Medications   Medication Sig    methIMAzole (TAPAZOLE) 10 mg tablet TAKE 3 TABLETS BY MOUTH EVERY MORNING    FIASP FLEXTOUCH U-100 INSULIN 100 unit/mL (3 mL) inpn     Magnesium Oxide 500 mg cap Take  by mouth. clotrimazole-betamethasone (LOTRISONE) topical cream Apply  to affected area two (2) times a day. pantoprazole (PROTONIX) 40 mg tablet take 1 tablet by mouth once daily    naproxen (NAPROSYN) 500 mg tablet Take 1 Tab by mouth every twelve (12) hours as needed for Pain.     albuterol (PROVENTIL HFA, VENTOLIN HFA, PROAIR HFA) 90 mcg/actuation inhaler Take 2 Puffs by inhalation every four (4) hours as needed for Wheezing. TOUJEO SOLOSTAR 300 unit/mL (1.5 mL) inpn     ibuprofen (MOTRIN) 600 mg tablet Take 1 Tab by mouth every six (6) hours as needed for Pain. PNV #08/UMQSTLR FUM/FOLIC ACID (PRENATAL MULTIVIT WITH IRON PO) Take  by mouth. HUMALOG KWIKPEN INSULIN 100 unit/mL kwikpen INJECT 30 UNITS UNDER THE SKIN TID AT MEALS AS INSTRUCTED (Patient not taking: Reported on 7/28/2022)    Eve Bhatia 30 30 gauge x 1/3\" USE TO INJECT INSULIN TID (Patient not taking: Reported on 7/28/2022)    ONETOUCH VERIO strip U TO TEST BLOOD SUGAR 8 TIMES D (Patient not taking: Reported on 7/28/2022)    INSULIN LISPRO (HUMALOG PEN SC) by SubCUTAneous route. (Patient not taking: Reported on 7/28/2022)     No current facility-administered medications for this visit. Allergies: (reviewed)  Allergies   Allergen Reactions    Aspirin Itching    Orange Swelling    Sulfa (Sulfonamide Antibiotics) Nausea and Vomiting    Sudafed [Pseudoephedrine Hcl] Other (comments)     Severe head ache          OBJECTIVE:    Physical Exam:  VITAL SIGNS: Vitals:    07/28/22 1139   BP: 108/73   Pulse: 81   Weight: 208 lb 6.4 oz (94.5 kg)   Height: 5' 2\" (1.575 m)     Body mass index is 38.12 kg/m². GENERAL TIGIST: Conversant, alert, oriented. No acute distress. HEENT: HEENT. No thyroid enlargement. No JVD. Neck: Supple without restrictions. RESPIRATORY: Clear to auscultation and percussion to the bases. No CVAT. CARDIOVASC: RRR without murmur/rub. GASTROINT: Soft, ND, mildly tender. Mass palpable to the umbilicus. MUSCULOSKEL: no joint tenderness, deformity or swelling   EXTREMITIES: extremities normal, atraumatic, no cyanosis or edema   PELVIC: Normal external genitalia. Broad-based, egg-shaped, cyst noted along the left vaginal wall measuring 4-5 cm in greatest dimension. Normal cervix. Large mass noted on bimanual exam. Limited mobility.   No appreciable cul de sac nodularity. RECTAL: Deferred   HEATHER SURVEY: No suspicious lymphadenopathy or edema noted. NEURO: Grossly intact. No acute deficit. Lab Data:    Lab Results   Component Value Date/Time    WBC 4.5 07/27/2022 02:30 PM    HGB 12.2 07/27/2022 02:30 PM    HCT 37.1 07/27/2022 02:30 PM    PLATELET 709 60/64/6781 02:30 PM    MCV 89.0 07/27/2022 02:30 PM     Lab Results   Component Value Date/Time    Sodium 139 07/27/2022 02:30 PM    Potassium 3.6 07/27/2022 02:30 PM    Chloride 103 07/27/2022 02:30 PM    CO2 28 07/27/2022 02:30 PM    Anion gap 8 07/27/2022 02:30 PM    Glucose 264 (H) 07/27/2022 02:30 PM    BUN 5 (L) 07/27/2022 02:30 PM    Creatinine 0.61 07/27/2022 02:30 PM    BUN/Creatinine ratio 8 (L) 07/27/2022 02:30 PM    GFR est AA >60 07/27/2022 02:30 PM    GFR est non-AA >60 07/27/2022 02:30 PM    Calcium 9.1 07/27/2022 02:30 PM         CT of abdomen/pelvis (7/27/22)  The visualized lung bases demonstrate no mass or consolidation. The heart size  is normal. There is no pericardial or pleural effusion. The liver, spleen, pancreas, and adrenal glands are normal. The gall bladder is  surgically absent without intra- or extra-hepatic biliary dilatation. The kidneys enhance symmetrically. There is mild left hydroureteronephrosis  likely attributable to mass effect from the pelvic cystic mass. There is no left  hydronephrosis. There are no dilated bowel loops. The appendix is normal.       There are no enlarged lymph nodes. There is no free fluid or free air. The urinary bladder is normal. There is a large pelvic cystic mass measuring  20.2 x 12.7 x 17.4 cm with multiple septations. The bony structures are age-appropriate. IMPRESSION  A large pelvic cystic mass with multiple septations is likely adnexal in origin  with differential considerations including benign and malignant etiologies. Mild  right hydronephrosis.       IMPRESSION/PLAN:  Ana María sifuentes a 35 y.o. female with a diagnosis of large, complex, pelvic mass. I reviewed with Dagojose Burgos her medical records, physical exam, and review of symptoms. I personally reviewed the CT images with her and her  and explained the findings. I discussed with them the differential diagnosis, including both benign and malignant etiologies. Although the mass is complex in appearance, I am reassured by the lack of ascites, carcinomatosis, and lymphadenopathy. Her CA-125 is mildly elevated, but I explained that it is not that specific, especially in a premenopausal woman. I recommend proceeding with surgical excision of the mass. Due to the size of the mass and the limited mobility, this is not something that can be done laparoscopically. I recommended that we proceed with exploratory laparotomy with resection of the mass. We will decide whether or not to proceed with hysterectomy and bilateral salpingooophorectomy based upon the operative findings. We will also address the vaginal cyst while we are in the OR. We may opt excise it, but will likely proceed with drainage and marsupialization, based upon operative findings. She was counseled on the risks, benefits, indications and alternatives of the planned procedure. We discussed the possible surgical risks of bleeding, infection, potential injury to other organs/structures, and the risks of anesthesia. Her questions were answered to her satisfaction and she wishes to proceed as planned. An electronic signature was used to sign this note.     Brandi Howard MD  07/28/22

## 2022-07-28 NOTE — LETTER
2022 12:35 PM    Patient:  Derick Alicea   YOB: 1980  Date of Visit: 2022      Dear Jaden Langston NP  Via In [facility], [facility] [address]  Via Fax: [phone]     Dion Graham, [address] [facility] [address]  [place] [id number]  Via Fax: [phone]: Thank you for referring Ms. Jules Jeff to me for evaluation/treatment. Below are the relevant portions of my assessment and plan of care. [address] [place] [address], [address], [address] [facility] [phone]  F [phone]    Office Note  Patient ID:  Name:  Derick Alicea  MRN:  [id number]  :  1980/41 y.o. Date:  2022      HISTORY OF PRESENT ILLNESS:  Derick Alicea is a 35 y.o.  premenopausal female who is a new patient being seen for a large, multi-septated, pelvic mass. She was seen in the ER on the evening of 22 for complaints of abdominal pain and constipation. These issues had been ongoing for at least a month. She had seen her PCP about the symptoms and they had actually ordered a CT scan, but that had not been done. She also had an appointment with gastroenterology later in the month. A CT scan was done in the ER. This revealed a large pelvic cystic mass measuring 20.2 x 12.7 x 17.4 cm with multiple septations. There was no free fluid, carcinomatosis, or lymphadenopathy. I briefly saw her in the ER and reviewed her imaging results. She wanted to go home since I would not be able to operate for several days. I did have the ER draw tumor markers before she was discharged. Her CA-125 was mildly elevated at 73. Her CEA was normal.  Her CA 19-9 is still pending. She is followed by Dr. Dion Graham with [facility] for gynecologic care. She last saw her in 2021. Her pap smear that time was negative. Dr. Kristal Whittaker has been following her for a large cyst of the left vaginal wall.   She actually drained it by aspiration in the office at her last visit. The cyst remains and was noted on her CT.        ROS:   and GI review:  Negative  Cardiopulmonary review:  Negative   Musculoskeletal:  Negative    A comprehensive review of systems was negative except for that written in the History of Present Illness. , 10 point ROS      OB/GYN ROS/HX:  [id number]   section x 2      Problem List:  Patient Active Problem List    Diagnosis Date Noted    Obesity, morbid ([address] 2018    ANGULO (dyspnea on exertion) 2016    Type 1 diabetes mellitus with complications ([address]     Obesity (BMI 30-39.9) 2016    Pneumonia, organism unspecified(486) 2011    Gall bladder stones 2011     PMH:  Past Medical History:   Diagnosis Date    Diabetes ([address]     Gastrointestinal disorder     GERD    GERD (gastroesophageal reflux disease)     Thyroid disease       PSH:  Past Surgical History:   Procedure Laterality Date    HX  SECTION      HX CHOLECYSTECTOMY      HX HEENT      eye surgery    HX LIPOMA RESECTION      HX ORTHOPAEDIC      Carpul jesus manuel    HX OTHER SURGICAL      lipoma removal      Social History:  Social History     Tobacco Use    Smoking status: Never    Smokeless tobacco: Never   Substance Use Topics    Alcohol use: No     Alcohol/week: 0.0 standard drinks      Family History:  Family History   Problem Relation Age of Onset    Thyroid Disease Mother     Heart Disease Father     Cancer Father 61        Colon cancer    Alcohol abuse Father     Breast Cancer Paternal Grandmother     Cancer Paternal Aunt         Breast cancer    Arthritis-rheumatoid Paternal Aunt     Breast Cancer Paternal Aunt     Cancer Paternal Aunt         breast cancer    Breast Cancer Paternal Aunt     Breast Cancer Other         great paternal aunt    Cancer Other         paternal aunt, ovarian cancer      Medications: (reviewed)  Current Outpatient Medications   Medication Sig  methIMAzole (TAPAZOLE) 10 mg tablet TAKE 3 TABLETS BY MOUTH EVERY MORNING    FIASP FLEXTOUCH U-100 INSULIN 100 unit/mL (3 mL) inpn     Magnesium Oxide 500 mg cap Take  by mouth.  clotrimazole-betamethasone (LOTRISONE) topical cream Apply  to affected area two (2) times a day.  pantoprazole (PROTONIX) 40 mg tablet take 1 tablet by mouth once daily    naproxen (NAPROSYN) 500 mg tablet Take 1 Tab by mouth every twelve (12) hours as needed for Pain.  albuterol (PROVENTIL HFA, VENTOLIN HFA, PROAIR HFA) 90 mcg/actuation inhaler Take 2 Puffs by inhalation every four (4) hours as needed for Wheezing.  TOUJEO SOLOSTAR 300 unit/mL (1.5 mL) inpn     ibuprofen (MOTRIN) 600 mg tablet Take 1 Tab by mouth every six (6) hours as needed for Pain.  PNV #40/[id number] FUM/FOLIC ACID (PRENATAL MULTIVIT WITH IRON PO) Take  by mouth.  HUMALOG KWIKPEN INSULIN 100 unit/mL kwikpen INJECT 30 UNITS UNDER THE SKIN TID AT MEALS AS INSTRUCTED (Patient not taking: Reported on 7/28/2022)    NOVOFINE 30 30 gauge x 1/3\" USE TO INJECT INSULIN TID (Patient not taking: Reported on 7/28/2022)    ONETOUCH VERIO strip U TO TEST BLOOD SUGAR 8 TIMES D (Patient not taking: Reported on 7/28/2022)    INSULIN LISPRO (HUMALOG PEN SC) by SubCUTAneous route. (Patient not taking: Reported on 7/28/2022)     No current facility-administered medications for this visit. Allergies: (reviewed)  Allergies   Allergen Reactions    Aspirin Itching    Orange Swelling    Sulfa (Sulfonamide Antibiotics) Nausea and Vomiting    Sudafed [Pseudoephedrine Hcl] Other (comments)     Severe head ache          OBJECTIVE:    Physical Exam:  VITAL SIGNS: Vitals:    07/28/22 1139   BP: 108/73   Pulse: 81   Weight: 208 lb 6.4 oz (94.5 kg)   Height: 5' 2\" (1.575 m)     Body mass index is 38.12 kg/m². GENERAL TIGIST: Conversant, alert, oriented. No acute distress. HEENT: HEENT. No thyroid enlargement. No JVD. Neck: Supple without restrictions. RESPIRATORY: Clear to auscultation and percussion to the bases. No CVAT. CARDIOVASC: RRR without murmur/rub. GASTROINT: Soft, ND, mildly tender. Mass palpable to the umbilicus. MUSCULOSKEL: no joint tenderness, deformity or swelling   EXTREMITIES: extremities normal, atraumatic, no cyanosis or edema   PELVIC: Normal external genitalia. Broad-based, egg-shaped, cyst noted along the left vaginal wall measuring 4-5 cm in greatest dimension. Normal cervix. Large mass noted on bimanual exam. Limited mobility. No appreciable cul de sac nodularity. RECTAL: Deferred   HEATHER SURVEY: No suspicious lymphadenopathy or edema noted. NEURO: Grossly intact. No acute deficit. Lab Data:    Lab Results   Component Value Date/Time    WBC 4.5 07/27/2022 02:30 PM    HGB 12.2 07/27/2022 02:30 PM    HCT 37.1 07/27/2022 02:30 PM    PLATELET 293 03/01/3859 02:30 PM    MCV 89.0 07/27/2022 02:30 PM     Lab Results   Component Value Date/Time    Sodium 139 07/27/2022 02:30 PM    Potassium 3.6 07/27/2022 02:30 PM    Chloride 103 07/27/2022 02:30 PM    CO2 28 07/27/2022 02:30 PM    Anion gap 8 07/27/2022 02:30 PM    Glucose 264 (H) 07/27/2022 02:30 PM    BUN 5 (L) 07/27/2022 02:30 PM    Creatinine 0.61 07/27/2022 02:30 PM    BUN/Creatinine ratio 8 (L) 07/27/2022 02:30 PM    GFR est AA >60 07/27/2022 02:30 PM    GFR est non-AA >60 07/27/2022 02:30 PM    Calcium 9.1 07/27/2022 02:30 PM         CT of abdomen/pelvis (7/27/22)  The visualized lung bases demonstrate no mass or consolidation. The heart size  is normal. There is no pericardial or pleural effusion. The liver, spleen, pancreas, and adrenal glands are normal. The gall bladder is  surgically absent without intra- or extra-hepatic biliary dilatation. The kidneys enhance symmetrically. There is mild left hydroureteronephrosis  likely attributable to mass effect from the pelvic cystic mass. There is no left  hydronephrosis.      There are no dilated bowel loops.  The appendix is normal.       There are no enlarged lymph nodes. There is no free fluid or free air. The urinary bladder is normal. There is a large pelvic cystic mass measuring  20.2 x 12.7 x 17.4 cm with multiple septations. The bony structures are age-appropriate. IMPRESSION  A large pelvic cystic mass with multiple septations is likely adnexal in origin  with differential considerations including benign and malignant etiologies. Mild  right hydronephrosis. IMPRESSION/PLAN:  Caleb Roldan is a 35 y.o. female with a diagnosis of large, complex, pelvic mass. I reviewed with Caleb Roldan her medical records, physical exam, and review of symptoms. I personally reviewed the CT images with her and her  and explained the findings. I discussed with them the differential diagnosis, including both benign and malignant etiologies. Although the mass is complex in appearance, I am reassured by the lack of ascites, carcinomatosis, and lymphadenopathy. Her CA-125 is mildly elevated, but I explained that it is not that specific, especially in a premenopausal woman. I recommend proceeding with surgical excision of the mass. Due to the size of the mass and the limited mobility, this is not something that can be done laparoscopically. I recommended that we proceed with exploratory laparotomy with resection of the mass. We will decide whether or not to proceed with hysterectomy and bilateral salpingooophorectomy based upon the operative findings. We will also address the vaginal cyst while we are in the OR. We may opt excise it, but will likely proceed with drainage and marsupialization, based upon operative findings. She was counseled on the risks, benefits, indications and alternatives of the planned procedure. We discussed the possible surgical risks of bleeding, infection, potential injury to other organs/structures, and the risks of anesthesia.   Her questions were answered to her satisfaction and she wishes to proceed as planned. An electronic signature was used to sign this note. Franc Spence MD  07/28/22          New Patient, Referred by [facility] ER, pt reports pelvic pain that is constant, she reports nausea and loose stools, no vomiting , no fever or chills    1. Have you been to the ER, urgent care clinic since your last visit? Hospitalized since your last visit? 2. Have you seen or consulted any other health care providers outside of the [address] [place] [address] since your last visit? Include any pap smears or colon screening. If you have questions, please do not hesitate to call me. I look forward to following Ms. Jenkins along with you.         Sincerely,      Franc Spence MD

## 2022-07-29 LAB — CANCER AG19-9 SERPL-ACNC: 26 U/ML (ref 0–35)

## 2022-07-29 NOTE — H&P
[address] [place] [address] Mathias Moritz [address], [address] [facility]  P [phone]  F [phone]        Patient ID:  Name:  Dago Burgos  MRN:  [id number]  :  1980/41 y.o. Date:  2022      HISTORY OF PRESENT ILLNESS:  Dago Burgos is a 35 y.o.  premenopausal female who is a new patient being seen for a large, multi-septated, pelvic mass. She was seen in the ER on the evening of 22 for complaints of abdominal pain and constipation. These issues had been ongoing for at least a month. She had seen her PCP about the symptoms and they had actually ordered a CT scan, but that had not been done. She also had an appointment with gastroenterology later in the month. A CT scan was done in the ER. This revealed a large pelvic cystic mass measuring 20.2 x 12.7 x 17.4 cm with multiple septations. There was no free fluid, carcinomatosis, or lymphadenopathy. I briefly saw her in the ER and reviewed her imaging results. She wanted to go home since I would not be able to operate for several days. I did have the ER draw tumor markers before she was discharged. Her CA-125 was mildly elevated at 73. She is followed by Dr. Annika Shrestha with [facility] for gynecologic care. She last saw her in 2021. Her pap smear that time was negative. Dr. David Claudio has been following her for a large cyst of the left vaginal wall. She actually drained it by aspiration in the office at her last visit. The cyst remains and was noted on her CT.        ROS:   and GI review:  Negative  Cardiopulmonary review:  Negative   Musculoskeletal:  Negative    A comprehensive review of systems was negative except for that written in the History of Present Illness. , 10 point ROS      OB/GYN ROS/HX:  [id number]   section x 2      Problem List:  Patient Active Problem List    Diagnosis Date Noted    Obesity, morbid ([address] 2018    ANGULO (dyspnea on exertion) 2016    Type 1 diabetes mellitus with complications ([facility]     Obesity (BMI 30-39.9) 2016    Pneumonia, organism unspecified([address]) 2011    Gall bladder stones 2011     PMH:  Past Medical History:   Diagnosis Date    Asthma     Diabetes ([facility]     Gastrointestinal disorder     GERD    GERD (gastroesophageal reflux disease)     Thyroid disease       PSH:  Past Surgical History:   Procedure Laterality Date    HX  SECTION  2012    HX  SECTION      HX CHOLECYSTECTOMY  2013    HX HEENT      eye surgery    HX LIPOMA RESECTION  2010    HX ORTHOPAEDIC      Carpul jesus manuel    HX OTHER SURGICAL      lipoma removal      Social History:  Social History     Tobacco Use    Smoking status: Never    Smokeless tobacco: Never   Substance Use Topics    Alcohol use: No     Alcohol/week: 0.0 standard drinks      Family History:  Family History   Problem Relation Age of Onset    Thyroid Disease Mother     Heart Disease Father     Cancer Father 61        Colon cancer    Alcohol abuse Father     No Known Problems Sister     Cancer Paternal Aunt         Breast cancer    Arthritis-rheumatoid Paternal Aunt     Breast Cancer Paternal Aunt     Cancer Paternal Aunt         breast cancer    Breast Cancer Paternal Aunt     Breast Cancer Paternal Grandmother     Breast Cancer Other         great paternal aunt    Cancer Other         paternal aunt, ovarian cancer    Anesth Problems Neg Hx       Medications: (reviewed)  No current facility-administered medications for this encounter. Current Outpatient Medications   Medication Sig    methIMAzole (TAPAZOLE) 10 mg tablet Take 10 mg by mouth in the morning. FIASP FLEXTOUCH U-100 INSULIN 100 unit/mL (3 mL) inpn     Magnesium Oxide 500 mg cap Take  by mouth.    pantoprazole (PROTONIX) 40 mg tablet take 1 tablet by mouth once daily    naproxen (NAPROSYN) 500 mg tablet Take 1 Tab by mouth every twelve (12) hours as needed for Pain.     albuterol (PROVENTIL HFA, VENTOLIN HFA, PROAIR HFA) 90 mcg/actuation inhaler Take 2 Puffs by inhalation every four (4) hours as needed for Wheezing. TOUJEO SOLOSTAR 300 unit/mL (1.5 mL) inpn 46 Units by SubCUTAneous route daily. NOVOFINE 30 30 gauge x 1/3\"     ONETOUCH VERIO strip     PNV #37/HBXOSKN FUM/FOLIC ACID (PRENATAL MULTIVIT WITH IRON PO) Take  by mouth. Allergies: (reviewed)  Allergies   Allergen Reactions    Aspirin Itching    Orange Swelling    Sulfa (Sulfonamide Antibiotics) Nausea and Vomiting    Sudafed [Pseudoephedrine Hcl] Other (comments)     Severe head ache          OBJECTIVE:    Physical Exam:  VITAL SIGNS: Vitals:    07/28/22 1454   Weight: 205 lb (93 kg)   Height: 5' 2\" (1.575 m)     Body mass index is 37.49 kg/m². GENERAL TIGIST: Conversant, alert, oriented. No acute distress. HEENT: HEENT. No thyroid enlargement. No JVD. Neck: Supple without restrictions. RESPIRATORY: Clear to auscultation and percussion to the bases. No CVAT. CARDIOVASC: RRR without murmur/rub. GASTROINT: Soft, ND, mildly tender. Mass palpable to the umbilicus. MUSCULOSKEL: no joint tenderness, deformity or swelling   EXTREMITIES: extremities normal, atraumatic, no cyanosis or edema   PELVIC: Normal external genitalia. Broad-based, egg-shaped, cyst noted along the left vaginal wall measuring 4-5 cm in greatest dimension. Normal cervix. Large mass noted on bimanual exam. Limited mobility. No appreciable cul de sac nodularity. RECTAL: Deferred   HEATHER SURVEY: No suspicious lymphadenopathy or edema noted. NEURO: Grossly intact. No acute deficit.        Lab Data:    Lab Results   Component Value Date/Time    WBC 4.5 07/27/2022 02:30 PM    HGB 12.2 07/27/2022 02:30 PM    HCT 37.1 07/27/2022 02:30 PM    PLATELET 168 99/17/9101 02:30 PM    MCV 89.0 07/27/2022 02:30 PM     Lab Results   Component Value Date/Time    Sodium 139 07/27/2022 02:30 PM    Potassium 3.6 07/27/2022 02:30 PM    Chloride 103 07/27/2022 02:30 PM    CO2 28 07/27/2022 02:30 PM    Anion gap 8 07/27/2022 02:30 PM    Glucose 264 (H) 07/27/2022 02:30 PM    BUN 5 (L) 07/27/2022 02:30 PM    Creatinine 0.61 07/27/2022 02:30 PM    BUN/Creatinine ratio 8 (L) 07/27/2022 02:30 PM    GFR est AA >60 07/27/2022 02:30 PM    GFR est non-AA >60 07/27/2022 02:30 PM    Calcium 9.1 07/27/2022 02:30 PM         CT of abdomen/pelvis (7/27/22)  The visualized lung bases demonstrate no mass or consolidation. The heart size  is normal. There is no pericardial or pleural effusion. The liver, spleen, pancreas, and adrenal glands are normal. The gall bladder is  surgically absent without intra- or extra-hepatic biliary dilatation. The kidneys enhance symmetrically. There is mild left hydroureteronephrosis  likely attributable to mass effect from the pelvic cystic mass. There is no left  hydronephrosis. There are no dilated bowel loops. The appendix is normal.       There are no enlarged lymph nodes. There is no free fluid or free air. The urinary bladder is normal. There is a large pelvic cystic mass measuring  20.2 x 12.7 x 17.4 cm with multiple septations. The bony structures are age-appropriate. IMPRESSION  A large pelvic cystic mass with multiple septations is likely adnexal in origin  with differential considerations including benign and malignant etiologies. Mild  right hydronephrosis. IMPRESSION/PLAN:  Ankush Baker is a 35 y.o. female with a diagnosis of large, complex, pelvic mass. I reviewed with Ankush Baker her medical records, physical exam, and review of symptoms. I personally reviewed the CT images with her and her  and explained the findings. I discussed with them the differential diagnosis, including both benign and malignant etiologies. Although the mass is complex in appearance, I am reassured by the lack of ascites, carcinomatosis, and lymphadenopathy.   Her CA-125 is mildly elevated, but I explained that it is not that specific, especially in a premenopausal woman. I recommend proceeding with surgical excision of the mass. Due to the size of the mass and the limited mobility, this is not something that can be done laparoscopically. I recommended that we proceed with exploratory laparotomy with resection of the mass. We will decide whether or not to proceed with hysterectomy and bilateral salpingooophorectomy based upon the operative findings. We will also address the vaginal cyst while we are in the OR. We may opt excise it, but will likely proceed with drainage and marsupialization, based upon operative findings. She was counseled on the risks, benefits, indications and alternatives of the planned procedure. We discussed the possible surgical risks of bleeding, infection, potential injury to other organs/structures, and the risks of anesthesia. Her questions were answered to her satisfaction and she wishes to proceed as planned. An electronic signature was used to sign this note. Dot Allan MD  07/29/22        Date of Surgery Update  Lito Xavier was seen and examined. History and physical has been reviewed. The patient has been examined. There have been no significant clinical changes since the completion of the originally dated History and Physical.  Patient identified by surgeon; surgical site was confirmed by patient and surgeon.     Dot Allan MD  August 1, 2022  7:07 AM

## 2022-08-01 ENCOUNTER — ANESTHESIA EVENT (OUTPATIENT)
Dept: SURGERY | Age: 42
DRG: 511 | End: 2022-08-01
Payer: MEDICAID

## 2022-08-01 ENCOUNTER — ANESTHESIA (OUTPATIENT)
Dept: SURGERY | Age: 42
DRG: 511 | End: 2022-08-01
Payer: MEDICAID

## 2022-08-01 ENCOUNTER — HOSPITAL ENCOUNTER (INPATIENT)
Age: 42
LOS: 2 days | Discharge: HOME | DRG: 511 | End: 2022-08-03
Attending: OBSTETRICS & GYNECOLOGY | Admitting: OBSTETRICS & GYNECOLOGY
Payer: MEDICAID

## 2022-08-01 DIAGNOSIS — R19.00 PELVIC MASS: Primary | ICD-10-CM

## 2022-08-01 LAB
GLUCOSE BLD STRIP.AUTO-MCNC: 111 MG/DL (ref 65–117)
GLUCOSE BLD STRIP.AUTO-MCNC: 168 MG/DL (ref 65–117)
GLUCOSE BLD STRIP.AUTO-MCNC: 173 MG/DL (ref 65–117)
GLUCOSE BLD STRIP.AUTO-MCNC: 177 MG/DL (ref 65–117)
GLUCOSE BLD STRIP.AUTO-MCNC: 214 MG/DL (ref 65–117)
GLUCOSE BLD STRIP.AUTO-MCNC: 216 MG/DL (ref 65–117)
HCG UR QL: NEGATIVE
SERVICE CMNT-IMP: ABNORMAL
SERVICE CMNT-IMP: NORMAL

## 2022-08-01 PROCEDURE — C9290 INJ, BUPIVACAINE LIPOSOME: HCPCS | Performed by: OBSTETRICS & GYNECOLOGY

## 2022-08-01 PROCEDURE — 77030040830 HC CATH URETH FOL MDII -A: Performed by: OBSTETRICS & GYNECOLOGY

## 2022-08-01 PROCEDURE — 88342 IMHCHEM/IMCYTCHM 1ST ANTB: CPT

## 2022-08-01 PROCEDURE — 77030042556 HC PNCL CAUT -B: Performed by: OBSTETRICS & GYNECOLOGY

## 2022-08-01 PROCEDURE — 74011250636 HC RX REV CODE- 250/636: Performed by: NURSE ANESTHETIST, CERTIFIED REGISTERED

## 2022-08-01 PROCEDURE — 77030039147 HC PWDR HEMSTS SURGICEL JNJ -D: Performed by: OBSTETRICS & GYNECOLOGY

## 2022-08-01 PROCEDURE — 88309 TISSUE EXAM BY PATHOLOGIST: CPT

## 2022-08-01 PROCEDURE — 88112 CYTOPATH CELL ENHANCE TECH: CPT

## 2022-08-01 PROCEDURE — 74011000250 HC RX REV CODE- 250: Performed by: OBSTETRICS & GYNECOLOGY

## 2022-08-01 PROCEDURE — 76060000037 HC ANESTHESIA 3 TO 3.5 HR: Performed by: OBSTETRICS & GYNECOLOGY

## 2022-08-01 PROCEDURE — 77030040922 HC BLNKT HYPOTHRM STRY -A

## 2022-08-01 PROCEDURE — 0UT90ZZ RESECTION OF UTERUS, OPEN APPROACH: ICD-10-PCS | Performed by: OBSTETRICS & GYNECOLOGY

## 2022-08-01 PROCEDURE — 88341 IMHCHEM/IMCYTCHM EA ADD ANTB: CPT

## 2022-08-01 PROCEDURE — 74011000258 HC RX REV CODE- 258: Performed by: OBSTETRICS & GYNECOLOGY

## 2022-08-01 PROCEDURE — 76210000016 HC OR PH I REC 1 TO 1.5 HR: Performed by: OBSTETRICS & GYNECOLOGY

## 2022-08-01 PROCEDURE — 0UT20ZZ RESECTION OF BILATERAL OVARIES, OPEN APPROACH: ICD-10-PCS | Performed by: OBSTETRICS & GYNECOLOGY

## 2022-08-01 PROCEDURE — 99220 PR INITIAL OBSERVATION CARE/DAY 70 MINUTES: CPT | Performed by: INTERNAL MEDICINE

## 2022-08-01 PROCEDURE — 2709999900 HC NON-CHARGEABLE SUPPLY: Performed by: OBSTETRICS & GYNECOLOGY

## 2022-08-01 PROCEDURE — 74011000250 HC RX REV CODE- 250: Performed by: NURSE ANESTHETIST, CERTIFIED REGISTERED

## 2022-08-01 PROCEDURE — 74011250636 HC RX REV CODE- 250/636: Performed by: ANESTHESIOLOGY

## 2022-08-01 PROCEDURE — 65410000002 HC RM PRIVATE OB

## 2022-08-01 PROCEDURE — 88307 TISSUE EXAM BY PATHOLOGIST: CPT

## 2022-08-01 PROCEDURE — 0UBF0ZZ EXCISION OF CUL-DE-SAC, OPEN APPROACH: ICD-10-PCS | Performed by: OBSTETRICS & GYNECOLOGY

## 2022-08-01 PROCEDURE — 74011250637 HC RX REV CODE- 250/637: Performed by: ANESTHESIOLOGY

## 2022-08-01 PROCEDURE — 74011636637 HC RX REV CODE- 636/637: Performed by: OBSTETRICS & GYNECOLOGY

## 2022-08-01 PROCEDURE — 77030040361 HC SLV COMPR DVT MDII -B: Performed by: OBSTETRICS & GYNECOLOGY

## 2022-08-01 PROCEDURE — 77030013079 HC BLNKT BAIR HGGR 3M -A: Performed by: ANESTHESIOLOGY

## 2022-08-01 PROCEDURE — 0UBC0ZZ EXCISION OF CERVIX, OPEN APPROACH: ICD-10-PCS | Performed by: OBSTETRICS & GYNECOLOGY

## 2022-08-01 PROCEDURE — 74011250637 HC RX REV CODE- 250/637: Performed by: PHYSICIAN ASSISTANT

## 2022-08-01 PROCEDURE — 88304 TISSUE EXAM BY PATHOLOGIST: CPT

## 2022-08-01 PROCEDURE — 77030002966 HC SUT PDS J&J -A: Performed by: OBSTETRICS & GYNECOLOGY

## 2022-08-01 PROCEDURE — 77030002996 HC SUT SLK J&J -A: Performed by: OBSTETRICS & GYNECOLOGY

## 2022-08-01 PROCEDURE — C1765 ADHESION BARRIER: HCPCS | Performed by: OBSTETRICS & GYNECOLOGY

## 2022-08-01 PROCEDURE — 74011250636 HC RX REV CODE- 250/636: Performed by: OBSTETRICS & GYNECOLOGY

## 2022-08-01 PROCEDURE — 36415 COLL VENOUS BLD VENIPUNCTURE: CPT

## 2022-08-01 PROCEDURE — 88305 TISSUE EXAM BY PATHOLOGIST: CPT

## 2022-08-01 PROCEDURE — 77030008684 HC TU ET CUF COVD -B: Performed by: ANESTHESIOLOGY

## 2022-08-01 PROCEDURE — 77030011278 HC ELECTRD LIG IMPT COVD -F: Performed by: OBSTETRICS & GYNECOLOGY

## 2022-08-01 PROCEDURE — 99231 SBSQ HOSP IP/OBS SF/LOW 25: CPT | Performed by: SURGERY

## 2022-08-01 PROCEDURE — P9045 ALBUMIN (HUMAN), 5%, 250 ML: HCPCS | Performed by: NURSE ANESTHETIST, CERTIFIED REGISTERED

## 2022-08-01 PROCEDURE — 0DTJ0ZZ RESECTION OF APPENDIX, OPEN APPROACH: ICD-10-PCS | Performed by: OBSTETRICS & GYNECOLOGY

## 2022-08-01 PROCEDURE — 0UT70ZZ RESECTION OF BILATERAL FALLOPIAN TUBES, OPEN APPROACH: ICD-10-PCS | Performed by: OBSTETRICS & GYNECOLOGY

## 2022-08-01 PROCEDURE — 76010000133 HC OR TIME 3 TO 3.5 HR: Performed by: OBSTETRICS & GYNECOLOGY

## 2022-08-01 PROCEDURE — 82962 GLUCOSE BLOOD TEST: CPT

## 2022-08-01 PROCEDURE — 77030026438 HC STYL ET INTUB CARD -A: Performed by: ANESTHESIOLOGY

## 2022-08-01 PROCEDURE — 77030008462 HC STPLR SKN PROX J&J -A: Performed by: OBSTETRICS & GYNECOLOGY

## 2022-08-01 PROCEDURE — 0DBU0ZZ EXCISION OF OMENTUM, OPEN APPROACH: ICD-10-PCS | Performed by: OBSTETRICS & GYNECOLOGY

## 2022-08-01 PROCEDURE — 77030031139 HC SUT VCRL2 J&J -A: Performed by: OBSTETRICS & GYNECOLOGY

## 2022-08-01 PROCEDURE — 88331 PATH CONSLTJ SURG 1 BLK 1SPC: CPT

## 2022-08-01 PROCEDURE — 81025 URINE PREGNANCY TEST: CPT

## 2022-08-01 RX ORDER — FENTANYL CITRATE 50 UG/ML
50 INJECTION, SOLUTION INTRAMUSCULAR; INTRAVENOUS AS NEEDED
Status: DISCONTINUED | OUTPATIENT
Start: 2022-08-01 | End: 2022-08-01 | Stop reason: HOSPADM

## 2022-08-01 RX ORDER — SODIUM CHLORIDE 0.9 % (FLUSH) 0.9 %
5-40 SYRINGE (ML) INJECTION AS NEEDED
Status: DISCONTINUED | OUTPATIENT
Start: 2022-08-01 | End: 2022-08-03 | Stop reason: HOSPADM

## 2022-08-01 RX ORDER — HYDROMORPHONE HYDROCHLORIDE 1 MG/ML
0.5 INJECTION, SOLUTION INTRAMUSCULAR; INTRAVENOUS; SUBCUTANEOUS
Status: DISCONTINUED | OUTPATIENT
Start: 2022-08-01 | End: 2022-08-01 | Stop reason: HOSPADM

## 2022-08-01 RX ORDER — SODIUM CHLORIDE 0.9 % (FLUSH) 0.9 %
5-40 SYRINGE (ML) INJECTION EVERY 8 HOURS
Status: DISCONTINUED | OUTPATIENT
Start: 2022-08-01 | End: 2022-08-03 | Stop reason: HOSPADM

## 2022-08-01 RX ORDER — ONDANSETRON 2 MG/ML
4 INJECTION INTRAMUSCULAR; INTRAVENOUS
Status: DISCONTINUED | OUTPATIENT
Start: 2022-08-01 | End: 2022-08-03 | Stop reason: HOSPADM

## 2022-08-01 RX ORDER — MORPHINE SULFATE 2 MG/ML
2 INJECTION, SOLUTION INTRAMUSCULAR; INTRAVENOUS
Status: DISCONTINUED | OUTPATIENT
Start: 2022-08-01 | End: 2022-08-01 | Stop reason: HOSPADM

## 2022-08-01 RX ORDER — SODIUM CHLORIDE 0.9 % (FLUSH) 0.9 %
5-40 SYRINGE (ML) INJECTION EVERY 8 HOURS
Status: DISCONTINUED | OUTPATIENT
Start: 2022-08-01 | End: 2022-08-01 | Stop reason: HOSPADM

## 2022-08-01 RX ORDER — PROPOFOL 10 MG/ML
INJECTION, EMULSION INTRAVENOUS AS NEEDED
Status: DISCONTINUED | OUTPATIENT
Start: 2022-08-01 | End: 2022-08-01 | Stop reason: HOSPADM

## 2022-08-01 RX ORDER — SODIUM CHLORIDE, SODIUM LACTATE, POTASSIUM CHLORIDE, CALCIUM CHLORIDE 600; 310; 30; 20 MG/100ML; MG/100ML; MG/100ML; MG/100ML
100 INJECTION, SOLUTION INTRAVENOUS CONTINUOUS
Status: DISCONTINUED | OUTPATIENT
Start: 2022-08-01 | End: 2022-08-01 | Stop reason: HOSPADM

## 2022-08-01 RX ORDER — MIDAZOLAM HYDROCHLORIDE 1 MG/ML
INJECTION, SOLUTION INTRAMUSCULAR; INTRAVENOUS AS NEEDED
Status: DISCONTINUED | OUTPATIENT
Start: 2022-08-01 | End: 2022-08-01 | Stop reason: HOSPADM

## 2022-08-01 RX ORDER — DIPHENHYDRAMINE HYDROCHLORIDE 50 MG/ML
12.5 INJECTION, SOLUTION INTRAMUSCULAR; INTRAVENOUS
Status: DISCONTINUED | OUTPATIENT
Start: 2022-08-01 | End: 2022-08-03 | Stop reason: HOSPADM

## 2022-08-01 RX ORDER — LIDOCAINE HYDROCHLORIDE 10 MG/ML
0.1 INJECTION, SOLUTION EPIDURAL; INFILTRATION; INTRACAUDAL; PERINEURAL AS NEEDED
Status: DISCONTINUED | OUTPATIENT
Start: 2022-08-01 | End: 2022-08-01 | Stop reason: HOSPADM

## 2022-08-01 RX ORDER — FENTANYL CITRATE 50 UG/ML
INJECTION, SOLUTION INTRAMUSCULAR; INTRAVENOUS AS NEEDED
Status: DISCONTINUED | OUTPATIENT
Start: 2022-08-01 | End: 2022-08-01 | Stop reason: HOSPADM

## 2022-08-01 RX ORDER — ROCURONIUM BROMIDE 10 MG/ML
INJECTION, SOLUTION INTRAVENOUS AS NEEDED
Status: DISCONTINUED | OUTPATIENT
Start: 2022-08-01 | End: 2022-08-01 | Stop reason: HOSPADM

## 2022-08-01 RX ORDER — NALOXONE HYDROCHLORIDE 0.4 MG/ML
0.4 INJECTION, SOLUTION INTRAMUSCULAR; INTRAVENOUS; SUBCUTANEOUS AS NEEDED
Status: DISCONTINUED | OUTPATIENT
Start: 2022-08-01 | End: 2022-08-03 | Stop reason: HOSPADM

## 2022-08-01 RX ORDER — KETAMINE HYDROCHLORIDE 10 MG/ML
INJECTION, SOLUTION INTRAMUSCULAR; INTRAVENOUS AS NEEDED
Status: DISCONTINUED | OUTPATIENT
Start: 2022-08-01 | End: 2022-08-01 | Stop reason: HOSPADM

## 2022-08-01 RX ORDER — MIDAZOLAM HYDROCHLORIDE 1 MG/ML
0.5 INJECTION, SOLUTION INTRAMUSCULAR; INTRAVENOUS
Status: DISCONTINUED | OUTPATIENT
Start: 2022-08-01 | End: 2022-08-01 | Stop reason: HOSPADM

## 2022-08-01 RX ORDER — SODIUM CHLORIDE 0.9 % (FLUSH) 0.9 %
5-40 SYRINGE (ML) INJECTION AS NEEDED
Status: DISCONTINUED | OUTPATIENT
Start: 2022-08-01 | End: 2022-08-01 | Stop reason: HOSPADM

## 2022-08-01 RX ORDER — DIPHENHYDRAMINE HYDROCHLORIDE 50 MG/ML
12.5 INJECTION, SOLUTION INTRAMUSCULAR; INTRAVENOUS AS NEEDED
Status: DISCONTINUED | OUTPATIENT
Start: 2022-08-01 | End: 2022-08-01 | Stop reason: HOSPADM

## 2022-08-01 RX ORDER — METHIMAZOLE 5 MG/1
10 TABLET ORAL DAILY
Status: DISCONTINUED | OUTPATIENT
Start: 2022-08-02 | End: 2022-08-03 | Stop reason: HOSPADM

## 2022-08-01 RX ORDER — SODIUM CHLORIDE 9 MG/ML
25 INJECTION, SOLUTION INTRAVENOUS CONTINUOUS
Status: DISCONTINUED | OUTPATIENT
Start: 2022-08-01 | End: 2022-08-01 | Stop reason: HOSPADM

## 2022-08-01 RX ORDER — PROCHLORPERAZINE EDISYLATE 5 MG/ML
10 INJECTION INTRAMUSCULAR; INTRAVENOUS
Status: DISCONTINUED | OUTPATIENT
Start: 2022-08-01 | End: 2022-08-03 | Stop reason: HOSPADM

## 2022-08-01 RX ORDER — DEXAMETHASONE SODIUM PHOSPHATE 4 MG/ML
INJECTION, SOLUTION INTRA-ARTICULAR; INTRALESIONAL; INTRAMUSCULAR; INTRAVENOUS; SOFT TISSUE AS NEEDED
Status: DISCONTINUED | OUTPATIENT
Start: 2022-08-01 | End: 2022-08-01 | Stop reason: HOSPADM

## 2022-08-01 RX ORDER — LIDOCAINE HYDROCHLORIDE 20 MG/ML
INJECTION, SOLUTION EPIDURAL; INFILTRATION; INTRACAUDAL; PERINEURAL AS NEEDED
Status: DISCONTINUED | OUTPATIENT
Start: 2022-08-01 | End: 2022-08-01 | Stop reason: HOSPADM

## 2022-08-01 RX ORDER — NEOSTIGMINE METHYLSULFATE 1 MG/ML
INJECTION, SOLUTION INTRAVENOUS AS NEEDED
Status: DISCONTINUED | OUTPATIENT
Start: 2022-08-01 | End: 2022-08-01 | Stop reason: HOSPADM

## 2022-08-01 RX ORDER — KETOROLAC TROMETHAMINE 30 MG/ML
30 INJECTION, SOLUTION INTRAMUSCULAR; INTRAVENOUS
Status: DISPENSED | OUTPATIENT
Start: 2022-08-01 | End: 2022-08-02

## 2022-08-01 RX ORDER — ENOXAPARIN SODIUM 100 MG/ML
40 INJECTION SUBCUTANEOUS EVERY 24 HOURS
Status: DISCONTINUED | OUTPATIENT
Start: 2022-08-01 | End: 2022-08-03 | Stop reason: HOSPADM

## 2022-08-01 RX ORDER — HYDROMORPHONE HYDROCHLORIDE 2 MG/ML
INJECTION, SOLUTION INTRAMUSCULAR; INTRAVENOUS; SUBCUTANEOUS AS NEEDED
Status: DISCONTINUED | OUTPATIENT
Start: 2022-08-01 | End: 2022-08-01 | Stop reason: HOSPADM

## 2022-08-01 RX ORDER — FENTANYL CITRATE 50 UG/ML
25 INJECTION, SOLUTION INTRAMUSCULAR; INTRAVENOUS
Status: DISCONTINUED | OUTPATIENT
Start: 2022-08-01 | End: 2022-08-01 | Stop reason: HOSPADM

## 2022-08-01 RX ORDER — PHENYLEPHRINE HCL IN 0.9% NACL 0.4MG/10ML
SYRINGE (ML) INTRAVENOUS AS NEEDED
Status: DISCONTINUED | OUTPATIENT
Start: 2022-08-01 | End: 2022-08-01 | Stop reason: HOSPADM

## 2022-08-01 RX ORDER — ONDANSETRON 2 MG/ML
4 INJECTION INTRAMUSCULAR; INTRAVENOUS AS NEEDED
Status: DISCONTINUED | OUTPATIENT
Start: 2022-08-01 | End: 2022-08-01 | Stop reason: HOSPADM

## 2022-08-01 RX ORDER — ROPIVACAINE HYDROCHLORIDE 5 MG/ML
30 INJECTION, SOLUTION EPIDURAL; INFILTRATION; PERINEURAL AS NEEDED
Status: DISCONTINUED | OUTPATIENT
Start: 2022-08-01 | End: 2022-08-01 | Stop reason: HOSPADM

## 2022-08-01 RX ORDER — DEXMEDETOMIDINE HYDROCHLORIDE 100 UG/ML
INJECTION, SOLUTION INTRAVENOUS AS NEEDED
Status: DISCONTINUED | OUTPATIENT
Start: 2022-08-01 | End: 2022-08-01 | Stop reason: HOSPADM

## 2022-08-01 RX ORDER — ACETAMINOPHEN 325 MG/1
650 TABLET ORAL ONCE
Status: COMPLETED | OUTPATIENT
Start: 2022-08-01 | End: 2022-08-01

## 2022-08-01 RX ORDER — GLYCOPYRROLATE 0.2 MG/ML
INJECTION INTRAMUSCULAR; INTRAVENOUS AS NEEDED
Status: DISCONTINUED | OUTPATIENT
Start: 2022-08-01 | End: 2022-08-01 | Stop reason: HOSPADM

## 2022-08-01 RX ORDER — SODIUM CHLORIDE 9 MG/ML
INJECTION, SOLUTION INTRAVENOUS
Status: DISCONTINUED | OUTPATIENT
Start: 2022-08-01 | End: 2022-08-01 | Stop reason: HOSPADM

## 2022-08-01 RX ORDER — PANTOPRAZOLE SODIUM 40 MG/1
40 TABLET, DELAYED RELEASE ORAL
Status: DISCONTINUED | OUTPATIENT
Start: 2022-08-02 | End: 2022-08-03 | Stop reason: HOSPADM

## 2022-08-01 RX ORDER — INSULIN LISPRO 100 [IU]/ML
INJECTION, SOLUTION INTRAVENOUS; SUBCUTANEOUS
Status: DISCONTINUED | OUTPATIENT
Start: 2022-08-01 | End: 2022-08-03 | Stop reason: HOSPADM

## 2022-08-01 RX ORDER — SUCCINYLCHOLINE CHLORIDE 20 MG/ML
INJECTION INTRAMUSCULAR; INTRAVENOUS AS NEEDED
Status: DISCONTINUED | OUTPATIENT
Start: 2022-08-01 | End: 2022-08-01 | Stop reason: HOSPADM

## 2022-08-01 RX ORDER — OXYBUTYNIN CHLORIDE 5 MG/1
5 TABLET, EXTENDED RELEASE ORAL ONCE
Status: COMPLETED | OUTPATIENT
Start: 2022-08-01 | End: 2022-08-01

## 2022-08-01 RX ORDER — MIDAZOLAM HYDROCHLORIDE 1 MG/ML
1 INJECTION, SOLUTION INTRAMUSCULAR; INTRAVENOUS AS NEEDED
Status: DISCONTINUED | OUTPATIENT
Start: 2022-08-01 | End: 2022-08-01 | Stop reason: HOSPADM

## 2022-08-01 RX ORDER — MAGNESIUM SULFATE 100 %
4 CRYSTALS MISCELLANEOUS AS NEEDED
Status: DISCONTINUED | OUTPATIENT
Start: 2022-08-01 | End: 2022-08-03 | Stop reason: HOSPADM

## 2022-08-01 RX ORDER — SODIUM CHLORIDE, SODIUM LACTATE, POTASSIUM CHLORIDE, CALCIUM CHLORIDE 600; 310; 30; 20 MG/100ML; MG/100ML; MG/100ML; MG/100ML
INJECTION, SOLUTION INTRAVENOUS
Status: DISCONTINUED | OUTPATIENT
Start: 2022-08-01 | End: 2022-08-01 | Stop reason: HOSPADM

## 2022-08-01 RX ORDER — SODIUM CHLORIDE 9 MG/ML
125 INJECTION, SOLUTION INTRAVENOUS CONTINUOUS
Status: DISCONTINUED | OUTPATIENT
Start: 2022-08-01 | End: 2022-08-02

## 2022-08-01 RX ORDER — ALBUTEROL SULFATE 0.83 MG/ML
2.5 SOLUTION RESPIRATORY (INHALATION)
Status: DISCONTINUED | OUTPATIENT
Start: 2022-08-01 | End: 2022-08-03 | Stop reason: HOSPADM

## 2022-08-01 RX ORDER — ALBUMIN HUMAN 50 G/1000ML
SOLUTION INTRAVENOUS AS NEEDED
Status: DISCONTINUED | OUTPATIENT
Start: 2022-08-01 | End: 2022-08-01 | Stop reason: HOSPADM

## 2022-08-01 RX ORDER — HYDROMORPHONE HYDROCHLORIDE 1 MG/ML
1 INJECTION, SOLUTION INTRAMUSCULAR; INTRAVENOUS; SUBCUTANEOUS
Status: DISCONTINUED | OUTPATIENT
Start: 2022-08-01 | End: 2022-08-03 | Stop reason: HOSPADM

## 2022-08-01 RX ORDER — ONDANSETRON 2 MG/ML
INJECTION INTRAMUSCULAR; INTRAVENOUS AS NEEDED
Status: DISCONTINUED | OUTPATIENT
Start: 2022-08-01 | End: 2022-08-01 | Stop reason: HOSPADM

## 2022-08-01 RX ADMIN — MIDAZOLAM 3 MG: 1 INJECTION INTRAMUSCULAR; INTRAVENOUS at 07:26

## 2022-08-01 RX ADMIN — ROCURONIUM BROMIDE 45 MG: 10 SOLUTION INTRAVENOUS at 07:44

## 2022-08-01 RX ADMIN — PHENYLEPHRINE HYDROCHLORIDE 50 MCG/MIN: 10 INJECTION INTRAVENOUS at 08:25

## 2022-08-01 RX ADMIN — DEXMEDETOMIDINE HYDROCHLORIDE 10 MCG: 100 INJECTION, SOLUTION, CONCENTRATE INTRAVENOUS at 10:21

## 2022-08-01 RX ADMIN — SODIUM CHLORIDE 125 ML/HR: 9 INJECTION, SOLUTION INTRAVENOUS at 10:51

## 2022-08-01 RX ADMIN — SODIUM CHLORIDE: 900 INJECTION, SOLUTION INTRAVENOUS at 10:17

## 2022-08-01 RX ADMIN — Medication 25 MG: at 08:05

## 2022-08-01 RX ADMIN — KETOROLAC TROMETHAMINE 30 MG: 30 INJECTION, SOLUTION INTRAMUSCULAR; INTRAVENOUS at 20:06

## 2022-08-01 RX ADMIN — NEOSTIGMINE METHYLSULFATE 3 MG: 1 INJECTION, SOLUTION INTRAVENOUS at 10:17

## 2022-08-01 RX ADMIN — Medication 2 UNITS: at 17:45

## 2022-08-01 RX ADMIN — CEFOXITIN SODIUM 2 G: 2 POWDER, FOR SOLUTION INTRAVENOUS at 08:00

## 2022-08-01 RX ADMIN — ALBUMIN (HUMAN) 250 ML: 12.5 INJECTION, SOLUTION INTRAVENOUS at 09:05

## 2022-08-01 RX ADMIN — PROPOFOL 180 MG: 10 INJECTION, EMULSION INTRAVENOUS at 07:38

## 2022-08-01 RX ADMIN — Medication 2 UNITS: at 22:18

## 2022-08-01 RX ADMIN — LIDOCAINE HYDROCHLORIDE 80 MG: 20 INJECTION, SOLUTION EPIDURAL; INFILTRATION; INTRACAUDAL; PERINEURAL at 07:38

## 2022-08-01 RX ADMIN — ROCURONIUM BROMIDE 20 MG: 10 SOLUTION INTRAVENOUS at 08:46

## 2022-08-01 RX ADMIN — ACETAMINOPHEN 650 MG: 325 TABLET ORAL at 07:21

## 2022-08-01 RX ADMIN — ROCURONIUM BROMIDE 5 MG: 10 SOLUTION INTRAVENOUS at 07:38

## 2022-08-01 RX ADMIN — ONDANSETRON HYDROCHLORIDE 4 MG: 2 INJECTION, SOLUTION INTRAMUSCULAR; INTRAVENOUS at 09:59

## 2022-08-01 RX ADMIN — DEXMEDETOMIDINE HYDROCHLORIDE 10 MCG: 100 INJECTION, SOLUTION, CONCENTRATE INTRAVENOUS at 09:39

## 2022-08-01 RX ADMIN — ALBUMIN (HUMAN) 250 ML: 12.5 INJECTION, SOLUTION INTRAVENOUS at 08:30

## 2022-08-01 RX ADMIN — SUCCINYLCHOLINE CHLORIDE 160 MG: 20 INJECTION, SOLUTION INTRAMUSCULAR; INTRAVENOUS at 07:38

## 2022-08-01 RX ADMIN — Medication 3 UNITS: at 11:11

## 2022-08-01 RX ADMIN — KETOROLAC TROMETHAMINE 30 MG: 30 INJECTION, SOLUTION INTRAMUSCULAR; INTRAVENOUS at 13:56

## 2022-08-01 RX ADMIN — ROCURONIUM BROMIDE 30 MG: 10 SOLUTION INTRAVENOUS at 09:08

## 2022-08-01 RX ADMIN — ENOXAPARIN SODIUM 40 MG: 100 INJECTION SUBCUTANEOUS at 17:45

## 2022-08-01 RX ADMIN — Medication 80 MCG: at 08:20

## 2022-08-01 RX ADMIN — SODIUM CHLORIDE, POTASSIUM CHLORIDE, SODIUM LACTATE AND CALCIUM CHLORIDE: 600; 310; 30; 20 INJECTION, SOLUTION INTRAVENOUS at 07:12

## 2022-08-01 RX ADMIN — CEFOXITIN SODIUM 2 G: 2 POWDER, FOR SOLUTION INTRAVENOUS at 09:53

## 2022-08-01 RX ADMIN — FENTANYL CITRATE 50 MCG: 50 INJECTION, SOLUTION INTRAMUSCULAR; INTRAVENOUS at 08:46

## 2022-08-01 RX ADMIN — GLYCOPYRROLATE 0.4 MG: 0.2 INJECTION, SOLUTION INTRAMUSCULAR; INTRAVENOUS at 10:17

## 2022-08-01 RX ADMIN — SODIUM CHLORIDE 125 ML/HR: 9 INJECTION, SOLUTION INTRAVENOUS at 17:50

## 2022-08-01 RX ADMIN — OXYBUTYNIN CHLORIDE 5 MG: 5 TABLET, EXTENDED RELEASE ORAL at 16:19

## 2022-08-01 RX ADMIN — SODIUM CHLORIDE, PRESERVATIVE FREE 10 ML: 5 INJECTION INTRAVENOUS at 22:18

## 2022-08-01 RX ADMIN — FENTANYL CITRATE 25 MCG: 50 INJECTION, SOLUTION INTRAMUSCULAR; INTRAVENOUS at 09:38

## 2022-08-01 RX ADMIN — DEXAMETHASONE SODIUM PHOSPHATE 4 MG: 4 INJECTION, SOLUTION INTRAMUSCULAR; INTRAVENOUS at 07:57

## 2022-08-01 RX ADMIN — Medication: at 10:46

## 2022-08-01 RX ADMIN — SODIUM CHLORIDE, POTASSIUM CHLORIDE, SODIUM LACTATE AND CALCIUM CHLORIDE 100 ML/HR: 600; 310; 30; 20 INJECTION, SOLUTION INTRAVENOUS at 07:19

## 2022-08-01 RX ADMIN — HYDROMORPHONE HYDROCHLORIDE 0.5 MG: 2 INJECTION, SOLUTION INTRAMUSCULAR; INTRAVENOUS; SUBCUTANEOUS at 09:38

## 2022-08-01 RX ADMIN — FENTANYL CITRATE 100 MCG: 50 INJECTION, SOLUTION INTRAMUSCULAR; INTRAVENOUS at 07:38

## 2022-08-01 RX ADMIN — Medication 15 MG: at 10:02

## 2022-08-01 RX ADMIN — SODIUM CHLORIDE, POTASSIUM CHLORIDE, SODIUM LACTATE AND CALCIUM CHLORIDE: 600; 310; 30; 20 INJECTION, SOLUTION INTRAVENOUS at 09:12

## 2022-08-01 RX ADMIN — FENTANYL CITRATE 25 MCG: 50 INJECTION, SOLUTION INTRAMUSCULAR; INTRAVENOUS at 07:46

## 2022-08-01 RX ADMIN — HYDROMORPHONE HYDROCHLORIDE 0.5 MG: 2 INJECTION, SOLUTION INTRAMUSCULAR; INTRAVENOUS; SUBCUTANEOUS at 09:15

## 2022-08-01 RX ADMIN — HYDROMORPHONE HYDROCHLORIDE 0.5 MG: 2 INJECTION, SOLUTION INTRAMUSCULAR; INTRAVENOUS; SUBCUTANEOUS at 10:17

## 2022-08-01 NOTE — PROGRESS NOTES
TRANSFER - IN REPORT:    Verbal report received from [facility] (name) on Christine Rangel  being received from PACU (unit) for routine post - op      Report consisted of patients Situation, Background, Assessment and   Recommendations(SBAR). Information from the following report(s) SBAR, Kardex, OR Summary, Procedure Summary, Intake/Output, and MAR was reviewed with the receiving nurse. Opportunity for questions and clarification was provided. Assessment completed upon patients arrival to unit and care assumed.

## 2022-08-01 NOTE — PROGRESS NOTES
1210  GenSurg consult, perfect serve to Mariajose Bernal NP for Dr. Marylou Nunez. [phone]  Hematology consult, perfect serve to Dr. Renita Tamayo. [phone]  GI consult called to Dr. Radha Cline taking consult. 29 [place], [place], messaged left. [address]. Was told to administer medication wait one hour, if pain continues pull maya. No order changes to insulin.

## 2022-08-01 NOTE — PROGRESS NOTES
[facility] [place] at [address], [address] [place]: [phone]  F: [phone]    Reason for Visit:   Ankush Baker is a 35 y.o. female who is seen in hospital at the request of Dr. Kath Gomez for evaluation of possible CR cancer. Treatment History:   22  Procedure(s): EXPLORATORY LAPAROTOMY, RESECTION OF MASS, TOTAL ABDOMINAL HYSTEROSCOPY, BILATERAL SALPINGOOOPHORECTOMY, OMENTECTOMY, APPENDECTOMY, PROCTOSCOPY    STAGE: pending path    History of Present Illness:     Pt seen today in hospital as a new patient for possible CR cancer found during surgery for a pelvic mass. CT 22: There is a large pelvic cystic mass measuring  20.2 x 12.7 x 17.4 cm with multiple septations  A large pelvic cystic mass with multiple septations is likely adnexal in origin  with differential considerations including benign and malignant etiologies. Mild  right hydronephrosis    Pt had surgery today. Seen post op in room. Awake and doing ok.  at bedside. Path pending. Pt was seen by general surgery in OR. No pain at my visit.    No fevers/ chills/ chest pain/ SOB/ nausea/ vomiting/diarrhea/ pain/fatigue    CEA 70      Past Medical History:   Diagnosis Date    Asthma     Diabetes ([address]     Gastrointestinal disorder     GERD    GERD (gastroesophageal reflux disease)     Thyroid disease       Past Surgical History:   Procedure Laterality Date    HX  SECTION  2012    HX  SECTION  2015    HX CHOLECYSTECTOMY  2013    HX HEENT      eye surgery    HX LIPOMA RESECTION  2010    HX ORTHOPAEDIC      Carpul jesus manuel    HX OTHER SURGICAL      lipoma removal      Social History     Tobacco Use    Smoking status: Never    Smokeless tobacco: Never   Substance Use Topics    Alcohol use: No     Alcohol/week: 0.0 standard drinks      Family History   Problem Relation Age of Onset    Thyroid Disease Mother     Heart Disease Father     Cancer Father 61 Colon cancer    Alcohol abuse Father     No Known Problems Sister     Cancer Paternal Aunt         Breast cancer    Arthritis-rheumatoid Paternal Aunt     Breast Cancer Paternal Aunt     Cancer Paternal Aunt         breast cancer    Breast Cancer Paternal Aunt     Breast Cancer Paternal Grandmother     Breast Cancer Other         great paternal aunt    Cancer Other         paternal aunt, ovarian cancer    Anesth Problems Neg Hx      Current Facility-Administered Medications   Medication Dose Route Frequency    albuterol (PROVENTIL HFA, VENTOLIN HFA, PROAIR HFA) inhaler 2 Puff  2 Puff Inhalation Q4H PRN    [START ON 8/2/2022] methIMAzole (TAPAZOLE) tablet 10 mg  10 mg Oral DAILY    [START ON 8/2/2022] pantoprazole (PROTONIX) tablet 40 mg  40 mg Oral ACB    insulin lispro (HUMALOG) injection   SubCUTAneous AC&HS    glucose chewable tablet 16 g  4 Tablet Oral PRN    glucagon (GLUCAGEN) injection 1 mg  1 mg IntraMUSCular PRN    0.9% sodium chloride infusion  125 mL/hr IntraVENous CONTINUOUS    sodium chloride (NS) flush 5-40 mL  5-40 mL IntraVENous Q8H    sodium chloride (NS) flush 5-40 mL  5-40 mL IntraVENous PRN    naloxone (NARCAN) injection 0.4 mg  0.4 mg IntraVENous PRN    ondansetron (ZOFRAN) injection 4 mg  4 mg IntraVENous Q4H PRN    diphenhydrAMINE (BENADRYL) injection 12.5 mg  12.5 mg IntraVENous Q4H PRN    enoxaparin (LOVENOX) injection 40 mg  40 mg SubCUTAneous Q24H    ketorolac (TORADOL) injection 30 mg  30 mg IntraVENous Q6H PRN    HYDROmorphone (DILAUDID) injection 1 mg  1 mg IntraVENous Q2H PRN    HYDROmorphone 30 mg/30 mL (DILAUDID) PCA   IntraVENous CONTINUOUS    prochlorperazine (COMPAZINE) injection 10 mg  10 mg IntraVENous Q6H PRN      Allergies   Allergen Reactions    Aspirin Itching    Orange Swelling    Sulfa (Sulfonamide Antibiotics) Nausea and Vomiting    Sudafed [Pseudoephedrine Hcl] Other (comments)     Severe head ache        Review of Systems: A complete review of systems was obtained, negative except as described above. Physical Exam:   Visit Vitals  /83   Pulse 67   Temp 97.7 °F (36.5 °C)   Resp 12   Ht 5' 2\" (1.575 m)   Wt 205 lb (93 kg)   LMP 07/14/2022 (Approximate)   SpO2 98%   BMI 37.49 kg/m²     ECOG PS:  2 post op  General: No distress  Eyes: PERRLA, anicteric sclerae  HENT: Atraumatic  Neck: Supple  Respiratory: CTAB, normal respiratory effort  CV: Normal rate, regular rhythm, no murmurs, no peripheral edema  GI: Soft, nontender,vertical scar with dressing  MS: in bed moving, can walk  Skin: No rashes, ecchymoses, or petechiae. Normal temperature, turgor, and texture. Psych: Alert, conversant appropriately    Results:     Lab Results   Component Value Date/Time    WBC 4.5 07/27/2022 02:30 PM    HGB 12.2 07/27/2022 02:30 PM    HCT 37.1 07/27/2022 02:30 PM    PLATELET 291 19/25/3960 02:30 PM    MCV 89.0 07/27/2022 02:30 PM    ABS. NEUTROPHILS 3.2 07/27/2022 02:30 PM     Lab Results   Component Value Date/Time    Sodium 139 07/27/2022 02:30 PM    Potassium 3.6 07/27/2022 02:30 PM    Chloride 103 07/27/2022 02:30 PM    CO2 28 07/27/2022 02:30 PM    Glucose 264 (H) 07/27/2022 02:30 PM    BUN 5 (L) 07/27/2022 02:30 PM    Creatinine 0.61 07/27/2022 02:30 PM    GFR est AA >60 07/27/2022 02:30 PM    GFR est non-AA >60 07/27/2022 02:30 PM    Calcium 9.1 07/27/2022 02:30 PM    Glucose (POC) 216 (H) 08/01/2022 10:52 AM     Lab Results   Component Value Date/Time    Bilirubin, total 0.3 07/27/2022 02:30 PM    ALT (SGPT) 14 07/27/2022 02:30 PM    Alk. phosphatase 81 07/27/2022 02:30 PM    Protein, total 6.8 07/27/2022 02:30 PM    Albumin 3.3 (L) 07/27/2022 02:30 PM    Globulin 3.5 07/27/2022 02:30 PM       CT Results (most recent):  Results from Hospital Encounter encounter on 07/27/22    CT ABD PELV W CONT    Narrative  EXAM:  CT abdomen pelvis with contrast    INDICATION: Abdominal pain    COMPARISON: None.     TECHNIQUE: Helical CT of the abdomen  and pelvis  following the uneventful  intravenous administration of nonionic contrast.  Coronal and sagittal reformats  are performed. CT dose reduction was achieved through use of a standardized  protocol tailored for this examination and automatic exposure control for dose  modulation. Adaptive statistical iterative reconstruction (ASIR) was utilized. FINDINGS:  The visualized lung bases demonstrate no mass or consolidation. The heart size  is normal. There is no pericardial or pleural effusion. The liver, spleen, pancreas, and adrenal glands are normal. The gall bladder is  surgically absent without intra- or extra-hepatic biliary dilatation. The kidneys enhance symmetrically. There is mild left hydroureteronephrosis  likely attributable to mass effect from the pelvic cystic mass. There is no left  hydronephrosis. There are no dilated bowel loops. The appendix is normal.    There are no enlarged lymph nodes. There is no free fluid or free air. The urinary bladder is normal. There is a large pelvic cystic mass measuring  20.2 x 12.7 x 17.4 cm with multiple septations. The bony structures are age-appropriate. Impression  A large pelvic cystic mass with multiple septations is likely adnexal in origin  with differential considerations including benign and malignant etiologies. Mild  right hydronephrosis. Records reviewed and summarized above. Pathology report(s) reviewed above. Radiology report(s) reviewed above. 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  Mild  right hydronephrosis  Post surgery  8/1/22  Procedure(s): EXPLORATORY LAPAROTOMY, RESECTION OF MASS, TOTAL ABDOMINAL HYSTEROSCOPY, BILATERAL SALPINGOOOPHORECTOMY, OMENTECTOMY, APPENDECTOMY, PROCTOSCOPY    Path pending. At surgery found to have a rectosigmoid mass. Likely primary site of cancer. CEA 70.    73. Seen by Gen Surgery in OR. Will need GI eval.   Will likely need more surgery. Would do CT chest for staging eventually. Pt is recovering from surgery and doing well. Understands she likely has cancer. We will follow for path. D/w GYN/ONC.    2) abdominal pain. Controlled at my visit. Monitor. 3) reported colon cancer in Father. May need to consider genetic testing. 4) asthma/ DM/GERD/ thyroid. Per IM. 5) psychosocial. Mood good. Comfortable post surgery.  at bedside. Has two young children. Pt has good support. We will follow for path  Call if questions    I appreciate the opportunity to participate in Ms. Rina Jenkins's care.     Signed By: Leonel Quinones DO

## 2022-08-01 NOTE — ANESTHESIA PREPROCEDURE EVALUATION
Relevant Problems   No relevant active problems       Anesthetic History   No history of anesthetic complications            Review of Systems / Medical History  Patient summary reviewed, nursing notes reviewed and pertinent labs reviewed    Pulmonary  Within defined limits          Asthma        Neuro/Psych   Within defined limits           Cardiovascular  Within defined limits                Exercise tolerance: >4 METS     GI/Hepatic/Renal  Within defined limits   GERD           Endo/Other  Within defined limits  Diabetes  Hypothyroidism  Morbid obesity     Other Findings              Physical Exam    Airway  Mallampati: II  TM Distance: > 6 cm  Neck ROM: normal range of motion   Mouth opening: Normal     Cardiovascular  Regular rate and rhythm,  S1 and S2 normal,  no murmur, click, rub, or gallop             Dental  No notable dental hx       Pulmonary  Breath sounds clear to auscultation               Abdominal  GI exam deferred       Other Findings            Anesthetic Plan    ASA: 2  Anesthesia type: general          Induction: Intravenous  Anesthetic plan and risks discussed with: Patient

## 2022-08-01 NOTE — PROGRESS NOTES
1135: TRANSFER - OUT REPORT:    Verbal report given to Trice JONES(name) on Jose R Montoya  being transferred to 303(unit) for routine post - op       Report consisted of patients Situation, Background, Assessment and   Recommendations(SBAR). Time Pre op antibiotic given:0800  Anesthesia Stop time: 2037  Vigil Present on Transfer to floor:yes  Order for Vigil on Chart:yes  Discharge Prescriptions with Chart:no    Information from the following report(s) SBAR, Kardex, OR Summary, and MAR was reviewed with the receiving nurse. Opportunity for questions and clarification was provided. Is the patient on 02? NO         Is the patient on a monitor? NO    Is the nurse transporting with the patient? NO    Surgical Waiting Area notified of patient's transfer from PACU?  YES      The following personal items collected during your admission accompanied patient upon transfer:   Dental Appliance: Dental Appliances: None  Vision:    Hearing Aid:    Jewelry:    Clothing: Clothing: Other (comment) (clothing)  Other Valuables:    Valuables sent to safe:

## 2022-08-01 NOTE — CONSULTS
[address] [place] [address] NOTE  [facility]  [phone] NP in-hospital cell phone M-F until 4:30  After 5pm or on weekends, please call  for physician on call        NAME:  Jolly Arrieta   :   1980   MRN:   [id number]       Referring Physician: Paulette Hickman Date: 2022 2:12 PM    Chief Complaint: colorectal cancer     History of Present Illness:  Patient is a 35 y.o. who is seen in consultation at the request of Dr. Josias Abel for colorectal cancer. Medical history as listed below. She had weeks of abdominal pain and constipation, started after having COVID in November, worsened since May. CT showed pelvic mass. She had hysterectomy and resection of mass today showing pelvic mas originating from the left ovary worrisome for mucinous neoplasm, path pending, found to have rectosigmoid mass with CEA 70. She reports hemorrhoids with occasional blood on wiping. She use to have daily bowel movements, for the past few months only having a bowel movement every 3 days or so. She now only complains of burning from her maya catheter. She reports chronic reflux on PPI. No history of EGD/colonoscopy. Occasional NSAID use. Distant relative uncle/cousin with CRC. I have reviewed the emergency room note, hospital admission note, notes by all other clinicians who have seen the patient during this hospitalization to date. I have reviewed the problem list and the reason for this hospitalization. I have reviewed the allergies and the medications the patient was taking at home prior to this hospitalization.     PMH:  Past Medical History:   Diagnosis Date    Asthma     Diabetes ([address]     Gastrointestinal disorder     GERD    GERD (gastroesophageal reflux disease)     Thyroid disease        PSH:  Past Surgical History:   Procedure Laterality Date    HX  SECTION  2012    HX  SECTION  2015    HX CHOLECYSTECTOMY  2013    HX HEENT      eye surgery    HX LIPOMA RESECTION  2010    HX ORTHOPAEDIC      Carpul jesus manuel    HX OTHER SURGICAL      lipoma removal       Allergies: Allergies   Allergen Reactions    Aspirin Itching    Orange Swelling    Sulfa (Sulfonamide Antibiotics) Nausea and Vomiting    Sudafed [Pseudoephedrine Hcl] Other (comments)     Severe head ache       Home Medications:  Prior to Admission Medications   Prescriptions Last Dose Informant Patient Reported? Taking? FIASP FLEXTOUCH U-100 INSULIN 100 unit/mL (3 mL) inpn 2022 at 0530  Yes Yes   Magnesium Oxide 500 mg cap 2022  Yes Yes   Sig: Take  by mouth. NOVOFINE 30 30 gauge x 1/3\"   Yes Yes   ONETOUCH VERIO strip   Yes Yes   PNV #11/[id number] FUM/FOLIC ACID (PRENATAL MULTIVIT WITH IRON PO) 2022  Yes Yes   Sig: Take  by mouth. TOUJEO SOLOSTAR 300 unit/mL (1.5 mL) inpn 2022  Yes Yes   Si Units by SubCUTAneous route daily. albuterol (PROVENTIL HFA, VENTOLIN HFA, PROAIR HFA) 90 mcg/actuation inhaler 2022  No Yes   Sig: Take 2 Puffs by inhalation every four (4) hours as needed for Wheezing. methIMAzole (TAPAZOLE) 10 mg tablet 2022  Yes Yes   Sig: Take 10 mg by mouth in the morning.    naproxen (NAPROSYN) 500 mg tablet 2022  No Yes   Sig: Take 1 Tab by mouth every twelve (12) hours as needed for Pain.   pantoprazole (PROTONIX) 40 mg tablet 2022  No Yes   Sig: take 1 tablet by mouth once daily      Facility-Administered Medications: None       Hospital Medications:  Current Facility-Administered Medications   Medication Dose Route Frequency    albuterol (PROVENTIL HFA, VENTOLIN HFA, PROAIR HFA) inhaler 2 Puff  2 Puff Inhalation Q4H PRN    [START ON 2022] methIMAzole (TAPAZOLE) tablet 10 mg  10 mg Oral DAILY    [START ON 2022] pantoprazole (PROTONIX) tablet 40 mg  40 mg Oral ACB    insulin lispro (HUMALOG) injection   SubCUTAneous AC&HS    glucose chewable tablet 16 g  4 Tablet Oral PRN    glucagon (GLUCAGEN) injection 1 mg  1 mg IntraMUSCular PRN    0.9% sodium chloride infusion  125 mL/hr IntraVENous CONTINUOUS    sodium chloride (NS) flush 5-40 mL  5-40 mL IntraVENous Q8H    sodium chloride (NS) flush 5-40 mL  5-40 mL IntraVENous PRN    naloxone (NARCAN) injection 0.4 mg  0.4 mg IntraVENous PRN    ondansetron (ZOFRAN) injection 4 mg  4 mg IntraVENous Q4H PRN    diphenhydrAMINE (BENADRYL) injection 12.5 mg  12.5 mg IntraVENous Q4H PRN    enoxaparin (LOVENOX) injection 40 mg  40 mg SubCUTAneous Q24H    ketorolac (TORADOL) injection 30 mg  30 mg IntraVENous Q6H PRN    HYDROmorphone (DILAUDID) injection 1 mg  1 mg IntraVENous Q2H PRN    HYDROmorphone 30 mg/30 mL (DILAUDID) PCA   IntraVENous CONTINUOUS    prochlorperazine (COMPAZINE) injection 10 mg  10 mg IntraVENous Q6H PRN       Social History:  Social History     Tobacco Use    Smoking status: Never    Smokeless tobacco: Never   Substance Use Topics    Alcohol use: No     Alcohol/week: 0.0 standard drinks       Family History:  Family History   Problem Relation Age of Onset    Thyroid Disease Mother     Heart Disease Father     Cancer Father 61        Colon cancer    Alcohol abuse Father     No Known Problems Sister     Cancer Paternal Aunt         Breast cancer    Arthritis-rheumatoid Paternal Aunt     Breast Cancer Paternal Aunt     Cancer Paternal Aunt         breast cancer    Breast Cancer Paternal Aunt     Breast Cancer Paternal Grandmother     Breast Cancer Other         great paternal aunt    Cancer Other         paternal aunt, ovarian cancer    Anesth Problems Neg Hx        Review of Systems:  Constitutional: negative fever, negative chills, negative weight loss  Eyes:   negative visual changes  ENT:   negative sore throat, tongue or lip swelling  Respiratory:  negative cough, negative dyspnea  Cards:  negative for chest pain, palpitations, lower extremity edema  GI:   See HPI  :  negative for frequency, dysuria  Integument:  negative for rash and pruritus  Heme:  negative for easy bruising and gum/nose bleeding  Musculoskeletal:negative for myalgias, back pain and muscle weakness  Neuro:    negative for headaches, dizziness, vertigo  Psych: negative for feelings of anxiety, depression     Objective:   Patient Vitals for the past 8 hrs:   BP Temp Pulse Resp SpO2 Height Weight   08/01/22 1302 128/83 97.7 °F (36.5 °C) 67 12 98 % -- --   08/01/22 1207 (!) 142/76 97.7 °F (36.5 °C) 75 12 97 % -- --   08/01/22 1130 122/75 97.6 °F (36.4 °C) 75 10 98 % -- --   08/01/22 1115 121/63 -- 67 12 97 % -- --   08/01/22 1058 (!) 114/56 -- 66 14 97 % -- --   08/01/22 1050 (!) 103/54 -- 70 13 97 % -- --   08/01/22 1045 (!) 113/58 -- 67 12 96 % -- --   08/01/22 1040 (!) 118/57 -- 77 12 98 % -- --   08/01/22 1036 118/60 97.9 °F (36.6 °C) 83 14 97 % -- --   08/01/22 1035 122/65 -- 82 10 99 % -- --   08/01/22 0658 135/87 98.4 °F (36.9 °C) 93 16 98 % 5' 2\" (1.575 m) 93 kg (205 lb)     08/01 0701 - 08/01 1900  In: 2020 [I.V.:2020]  Out: 1075 [Urine:475]  No intake/output data recorded. EXAM:     CONST:  Pleasant lady lying in bed, no acute distress   NEURO:  alert and oriented x 3   HEENT: EOMI, no scleral icterus   LUNGS: No increased WOB   CARD:   Regular rate   ABD:  soft, midline dressing with some drainage, + tenderness, no rebound, no masses, non distended   EXT:  no edema, warm   PSYCH: full, not anxious     Data Review     No results for input(s): WBC, HGB, HCT, PLT, HGBEXT, HCTEXT, PLTEXT in the last 72 hours. No results for input(s): NA, K, CL, CO2, BUN, CREA, GLU, PHOS, CA in the last 72 hours. No results for input(s): AP, TBIL, TP, ALB, GLOB, GGT, AML, LPSE in the last 72 hours. No lab exists for component: SGOT, GPT, AMYP, HLPSE  No results for input(s): INR, PTP, APTT, INREXT in the last 72 hours.        Assessment:     Rectosigmoid mass: CEA 70  Constipation/change in bowel habits  Pelvic mass     Patient Active Problem List   Diagnosis Code    Pneumonia, organism unspecified(486) J18.9    Gall bladder stones K80.20    ANGULO (dyspnea on exertion) R06.00    Type 1 diabetes mellitus with complications (HCC) K18.9    Obesity (BMI 30-39. 9) E66.9    Obesity, morbid ([address] E66.01    Pelvic mass R19.00     Plan:       Plan for colonoscopy when cleared post-op by gyn/onc  GYN/onc, Surgery and Oncology following  Patient discussed with Dr. Mary Jo Escobar  Thank you for allowing me to participate in care of Ayleen Troncoso     Signed By: Wm Ring NP     8/1/2022  2:12 PM        Gastroenterology Attending Physician attestation statement and comments. This patient was seen and examined by me in a face-to-face visit today. I reviewed the medical record including lab work, imaging and other provider notes. I confirmed the history as described above. I spoke to the patient, reviewed the medical record including lab work, imaging and other provider notes. I discussed this case in detail with Therese Garcia NP. I formulated an  assessment of this patient and developed a treatment plan. I agree with the above consultation note. I agree with the history, exam and assessment and plan as outlined in the note. I would like to add the following: Patient seen and examined. Plan for outpatient colonoscopy once she has recovered from her pelvic surgery and cleared by Gyn/Onc. Oral Escobar MD

## 2022-08-01 NOTE — OP NOTES
Gynecologic Oncology Operative Report    Lito Xavier  8/1/2022    Pre-operative dx:  Pelvic mass    Post-operative dx:  Metastatic colorectal cancer    Procedure:  Exploratory laparotomy, resection of mass, total abdominal hysterectomy, bilateral salpingooophorectomy, omentectomy, appendectomy, proctoscopy    Surgeon:  Dot Allan MD    Assistant:  Pinky Butts PA-C (Assistance was required due to the difficulty of the procedure. They actively assisted with the necessary dissection and proper identification of relevant anatomy throughout the course of the procedure.)     Consultant:  Zuleyka Fenton MD (general surgery)    Anesthesia:  GETA    EBL:  <97 cc    Complications:  None    Specimens:  ID Type Source Tests Collected by Time Destination   1 : LEFT PELVIC MASS, LEFT TUBE AND OVARY Frozen Section Pelvis   Ciera Jamison MD 8/1/2022 3094 Pathology   2 : LEFT [facility] PELVIC LIGAMENT Fresh Pelvis   Ciera Jamison MD 8/1/2022 9884 Pathology   3 : Josie Rob [address] PERITONEUM Fresh Peritoneum   Ciera Jamison MD 8/1/2022 3568 Pathology   4 : UTERUS, CERVIX, AND RIGHT TUBE AND OVARY Fresh Uterus   Ciera Jamison MD 8/1/2022 5086 Pathology   5 : OMENTUM Fresh Omentum   Ciera Jamison MD 8/1/2022 5627 Pathology   6 : APPENDIX Fresh Appendix   Ciera Jamison MD 8/1/2022 8056 Pathology   1 : Izzy Lord Fresh Abdomen   Ciera Jamison MD 8/1/2022 6974 Cytology     Implants:  None    Operative indications:  34 yo WF who presented to the ER with abdominal pain. She also reported constipation. A CT revealed a large pelvic mass measuring 20.2 x 12.7 x 17.4 cm. The mass filled the cul de sac and extended to the umbilicus. I recommended proceeding with surgical excision. Operative findings:  Large, complex, solid/cystic mass arising from the left ovary. The mass was adherent within the cul de sac and extended to the umbilicus. Normal-appearing uterus. Normal-appearing right tube and ovary.   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 On palpation of colon she was noted to have a very firm lesion of the upper rectum, consistent with a colorectal primary. The colon did not appear obstructed. No other appreciable peritoneal disease. The remainder of the colon and small bowel appeared normal.  Atrophic vestigal appendix. Diaphragmatic surfaces smooth. No appreciable liver lesions. Unable to visualize the lesion by proctoscopy  due to large amount of stool. General surgery consulted intraoperatively. They recommended not doing the colon resection at this time, since she was not adequately prepped and would need a colostomy. She might also benefit from some neoadjuvant therapy. Procedure in detail:  After the risks, benefits, indications, and alternatives of the procedure were discussed with the patient and informed consent was obtained, the patient was taken to the operating room where she was identified as the correct patient. She was then administered general anesthesia and placed in the dorsal lithotomy position. She was then prepped and draped in the usual fashion. A Vigil catheter was inserted. The abdomen was entered via vertical midline skin incision extending from the pubic symphysis to a several centimeters above the umbilicus. Upon entering the abdominal cavity, the above-mentioned findings were noted. There was a small volume of ascites present. I collected some for cytology. I then palpated around the large pelvic mass. I noted some fibrous adhesions to the posterior uterus and pelvic sidewalls, but I was able to bluntly free these adhesions. The mass did appeared to be torsed at least once. Using the Ligasure device I coagulated and transected the blood supply just below the level of the torsion. I then was able to manipulate and remove the mass. It was sent for frozen section pathology. A Bookwalter retractor was then placed.  The bowel was packed into the upper abdomen with moist lap sponges. Using cautery, I then opened the peritoneum lateral to the left adnexa over the psoas muscle. This allowed entry into the retroperitoneal space where the left ureter was identified. The left ovarian vessels were then isolated. A window was created in the posterior leaf of the broad ligament between the ureter and the left ovarian vessels. The ovarian vessels were then coagulated and transected with the Ligasure device. The remaining attachments to the left uterine cornua were then also coagulated and transected with the Ligasure. Pathology called back with the diagnosis of a mucinous tumor, likely malignant. We then proceed with hysterectomy and removal of the contralateral ovary. The left round ligament was identified. It was coagulated and transected with electrocautery. We then moved to the right side of the patient and identified the right round ligament. It was coagulated and transected with electrocautery, allowing entry into the retroperitoneal space, where the right ureter was identified. A window was then created in the posterior leaf of the broad ligament between the right ovarian vessels and the right ureter. The right ovarian vessels were then coagulated and transected with the Ligasure. We then moved anteriorly and began sharply dissecting the bladder off the lower uterine segment and cervix. We then skeletonized the uterine arteries bilaterally along the lateral aspect of the cervix. Curved Zeppelin clamps were placed at the level of the internal cervical os on each side. The pedicles were transected and then ligated with Vicryl suture. We then moved down the cervix, taking straight Zeppelin clamp bites on each side of the cervix, staying medial to the last bite. Each of these pedicles was transected and ligated with Vicryl suture.   At this point, we were able to come across the upper vagina just below the cervix with angled Zeppelin clamps coming from each side and meeting in the midline. The specimen was then excised with Mendy scissors and sent for pathology. The vaginal cuff was then closed with a 0 Vicryl suture in a running locking fashion, utilizing the [place] technique. We then inspected the bowel and the appendix. She was noted to have a very firm mass in the wall of the rectum, above the peritoneal refection and below the pelvic brim. I felt that this likely represented a colorectal carcinoma and this was the likely primary site of disease, rather then the ovary. I proceeded to perform a proctoscopy at that point. The proctoscope was inserted. While my assistant occluded the colon at the pelvic brim, I insufflated the rectum. I advanced the proctoscope but due to the large amount of stool, I was not able to visualize the lesion. I then identified the appendix, which was abnormal in appearance. It was not concerning for malignancy, but there was only about a 3-4 cm segment of appendix and it did not appear to be attached to the cecum, just adherent to the adjacent mesentery and connective tissue. It appeared that the appendix had involuted or had not fully developed. It was excised with cautery. During the dissection, I confirmed that it was not connected to the cecum. General surgery was consulted intraoperatively. He agreed that this was likely a rectal carcinoma and recommended not doing the colon resection at this time, since she was not adequately prepped and would most definitely need a colostomy. She might also benefit from some neoadjuvant therapy prior to any future surgical resection. At this point, we irrigated the pelvis and made sure there was no evidence of bleeding. Some surgicel powder was placed over the areas of pelvic dissection. Seprafilm was then placed over the lesion in the rectum and the areas of pelvic dissection.   We then removed all lap sponges and retractors, and the abdominal wall was then closed with a number 1 looped PDS suture in a running, nonlocking fashion. Subcutaneous tissues were made hemostatic with electrocautery, and the skin was closed with stainless steel staples. A sterile pressure dressing was then applied. The patient was then awakened from anesthesia and taken to the recovery room in stable condition. All sponge, lap, and needle counts were correct.       Roger Sharpe MD  8/1/2022  10:26 AM

## 2022-08-01 NOTE — BRIEF OP NOTE
Brief Postoperative Note    Patient: Christine Rangel  YOB: 1980  MRN: [id number]    Date of Procedure: 8/1/2022     Pre-Op Diagnosis: PELVIC MASS    Post-Op Diagnosis: METASTATIC COLORECTAL CANCER      Procedure(s): EXPLORATORY LAPAROTOMY, RESECTION OF MASS, TOTAL ABDOMINAL HYSTEROSCOPY, BILATERAL SALPINGOOOPHORECTOMY, OMENTECTOMY, APPENDECTOMY, PROCTOSCOPY    Surgeon(s):  MD Vika Cisneros MD    Surgical Assistant: Physician Assistant: Piyush Dietrich PA-C    Anesthesia: General     Estimated Blood Loss (mL): 065     Complications: None    Specimens:   ID Type Source Tests Collected by Time Destination   1 : LEFT PELVIC MASS, LEFT TUBE AND OVARY Frozen Section Pelvis  Beatris Mathur MD 8/1/2022 6009 Pathology   2 : LEFT [facility] PELVIC LIGAMENT Fresh Pelvis  Beatris Mathur MD 8/1/2022 1572 Pathology   3 : Josie Rob [address] PERITONEUM Fresh Peritoneum  Beatris Mathur MD 8/1/2022 3942 Pathology   4 : UTERUS, CERVIX, AND RIGHT TUBE AND OVARY Fresh Uterus  Beatris Mathur MD 8/1/2022 1310 Pathology   5 : OMENTUM Fresh Omentum  Beatris Mathur MD 8/1/2022 9608 Pathology   6 : APPENDIX Fresh Appendix  Beatris Mathur MD 8/1/2022 6040 Pathology   1 : PELVIC WASHINGS Fresh Abdomen  Beatris Mathur MD 8/1/2022 0120 Cytology        Implants: * No implants in log *    Drains: * No LDAs found *    Findings: Large, complex, solid/cystic mass arising from the left ovary. The mass was adherent within the cul de sac and extended to the umbilicus. Normal-appearing uterus. Normal-appearing right tube and ovary. 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 On palpation of colon she was noted to have a very firm lesion of the upper rectum, consistent with a colorectal primary. The colon did not appear obstructed. No other appreciable peritoneal disease. The remainder of the colon and small bowel appeared normal.  Atrophic vestigal appendix. Diaphragmatic surfaces smooth.   No appreciable liver lesions. Unable to visualize the lesion by proctoscopy  due to large amount of stool. General surgery consulted intraoperatively. They recommended not doing the colon resection at this time, since she was not adequately prepped and would need a colostomy. She might also benefit from some neoadjuvant therapy.      Electronically Signed by J Carlos Palacio MD on 8/1/2022 at 10:08 AM

## 2022-08-01 NOTE — CONSULTS
Surgery Consult    Subjective:      Miko Carvalho is a 35 y.o. female who is being seen for evaluation of rectosigmoid mass. Over the last several weeks, she has developed progressive abdominal pain, worsening constipation. Recent CT abdomen/pelvis performed in the emergency department revealed a large pelvic mass with cystic features. She was evaluated by gynecology oncology, and was taken to the operating today for laparotomy. Findings included a 20 cm x 12 cm pelvic mass originating from the left ovary, adherent to the sidewall, worrisome for mucinous neoplasm, likely malignant. Total abdominal hysterectomy, BSO with resection of mass were performed. During the procedure, a rectal mass was palpated. I was called to the operating room for inspection of this lesion which appears to be a mucosal-based lesion. No adenopathy, no signs of distant spread. She notes longstanding constipation, and blood per rectum (attributed to hemorrhoids). She believes she has uncle with history of colorectal cancer. She has a history of type 1 diabetes mellitus. She denies weight loss or bone pain. She had scheduled a gastroenterology appointment for evaluation of constipation.     Patient Active Problem List    Diagnosis Date Noted    Pelvic mass 2022    Obesity, morbid ([address] 2018    ANGULO (dyspnea on exertion) 2016    Type 1 diabetes mellitus with complications ([address]     Obesity (BMI 30-39.9) 2016    Pneumonia, organism unspecified([address]) 2011    Gall bladder stones 2011     Past Medical History:   Diagnosis Date    Asthma     Diabetes ([address]     Gastrointestinal disorder     GERD    GERD (gastroesophageal reflux disease)     Thyroid disease       Past Surgical History:   Procedure Laterality Date    HX  SECTION  2012    HX  SECTION  2015    HX CHOLECYSTECTOMY  2013    HX HEENT      eye surgery    HX LIPOMA RESECTION  2010    HX ORTHOPAEDIC Carpul jesus manuel    HX OTHER SURGICAL      lipoma removal      Social History     Tobacco Use    Smoking status: Never    Smokeless tobacco: Never   Substance Use Topics    Alcohol use: No     Alcohol/week: 0.0 standard drinks      Family History   Problem Relation Age of Onset    Thyroid Disease Mother     Heart Disease Father     Cancer Father 61        Colon cancer    Alcohol abuse Father     No Known Problems Sister     Cancer Paternal Aunt         Breast cancer    Arthritis-rheumatoid Paternal Aunt     Breast Cancer Paternal Aunt     Cancer Paternal Aunt         breast cancer    Breast Cancer Paternal Aunt     Breast Cancer Paternal Grandmother     Breast Cancer Other         great paternal aunt    Cancer Other         paternal aunt, ovarian cancer    Anesth Problems Neg Hx       Current Facility-Administered Medications   Medication Dose Route Frequency    albuterol (PROVENTIL HFA, VENTOLIN HFA, PROAIR HFA) inhaler 2 Puff  2 Puff Inhalation Q4H PRN    [START ON 8/2/2022] methIMAzole (TAPAZOLE) tablet 10 mg  10 mg Oral DAILY    [START ON 8/2/2022] pantoprazole (PROTONIX) tablet 40 mg  40 mg Oral ACB    insulin lispro (HUMALOG) injection   SubCUTAneous AC&HS    glucose chewable tablet 16 g  4 Tablet Oral PRN    glucagon (GLUCAGEN) injection 1 mg  1 mg IntraMUSCular PRN    0.9% sodium chloride infusion  125 mL/hr IntraVENous CONTINUOUS    sodium chloride (NS) flush 5-40 mL  5-40 mL IntraVENous Q8H    sodium chloride (NS) flush 5-40 mL  5-40 mL IntraVENous PRN    naloxone (NARCAN) injection 0.4 mg  0.4 mg IntraVENous PRN    ondansetron (ZOFRAN) injection 4 mg  4 mg IntraVENous Q4H PRN    diphenhydrAMINE (BENADRYL) injection 12.5 mg  12.5 mg IntraVENous Q4H PRN    enoxaparin (LOVENOX) injection 40 mg  40 mg SubCUTAneous Q24H    ketorolac (TORADOL) injection 30 mg  30 mg IntraVENous Q6H PRN    HYDROmorphone (DILAUDID) injection 1 mg  1 mg IntraVENous Q2H PRN    HYDROmorphone 30 mg/30 mL (DILAUDID) PCA   IntraVENous CONTINUOUS    prochlorperazine (COMPAZINE) injection 10 mg  10 mg IntraVENous Q6H PRN      Allergies   Allergen Reactions    Aspirin Itching    Orange Swelling    Sulfa (Sulfonamide Antibiotics) Nausea and Vomiting    Sudafed [Pseudoephedrine Hcl] Other (comments)     Severe head ache       Review of Systems:    A complete review of systems was negative except as noted in the HPI. Objective:      Visit Vitals  BP (!) 142/76   Pulse 75   Temp 97.7 °F (36.5 °C)   Resp 12   Ht 5' 2\" (1.575 m)   Wt 205 lb (93 kg)   LMP 07/14/2022 (Approximate)   SpO2 97%   BMI 37.49 kg/m²       Physical Exam:  GENERAL: alert, cooperative, no distress, appears stated age, morbidly obese, EYE: negative, LYMPH NODES: No cervical or supraclavicular adenopathy. THROAT & NECK: normal, LUNG: clear to auscultation bilaterally, HEART: regular rate and rhythm, S1, S2 normal, no murmur. ABDOMEN: Obese, quiet bowel sounds, soft. Midline dressing intact. Expected incisional pain with palpation. EXTREMITIES:  extremities normal, atraumatic, no cyanosis or edema, SKIN: Normal., NEUROLOGIC: negative    Imaging:  reviewed  CT      Assessment:     Rectosigmoid mass, concerning for neoplasm; no prior colonoscopy. Plan:     1. I recommend proceeding with gastroenterology evaluation, plans for outpatient colonoscopy with biopsy. 2.  We will draw CEA level. 3.  Discussed in full detail with patient, spouse the plan for full gastroenterology work-up with plans for subsequent laparoscopic resection. They agree with this plan. All questions answered. We will follow with you.

## 2022-08-01 NOTE — ANESTHESIA POSTPROCEDURE EVALUATION
Post-Anesthesia Evaluation and Assessment    Patient: Virgen Alvarez MRN: [id number]  SSN: [id number]    YOB: 1980  Age: 35 y.o. Sex: female      I have evaluated the patient and they are stable and ready for discharge from the PACU. Cardiovascular Function/Vital Signs  Visit Vitals  /83   Pulse 67   Temp 36.5 °C (97.7 °F)   Resp 12   Ht 5' 2\" (1.575 m)   Wt 93 kg (205 lb)   SpO2 98%   BMI 37.49 kg/m²       Patient is status post General anesthesia for Procedure(s):  EXPLORATORY LAPAROTOMY, RESECTION OF MASS, TOTAL ABDOMINAL HYSTEROSCOPY, BILATERAL SALPINGO-OOPHORECTOMY, OMENTECTOMY, AND PROCTOSCOPY. Nausea/Vomiting: None    Postoperative hydration reviewed and adequate. Pain:  Pain Scale 1: Numeric (0 - 10) (08/01/22 1115)  Pain Intensity 1: 1 (08/01/22 1115)   Managed    Neurological Status:   Neuro (WDL): Within Defined Limits (08/01/22 1115)  Neuro  Neurologic State: Alert;Eyes open to voice (08/01/22 1115)  LUE Motor Response: Purposeful (08/01/22 1115)  LLE Motor Response: Purposeful (08/01/22 1115)  RUE Motor Response: Purposeful (08/01/22 1115)  RLE Motor Response: Purposeful (08/01/22 1115)   At baseline    Mental Status, Level of Consciousness: Alert and  oriented to person, place, and time    Pulmonary Status:   O2 Device: None (Room air) (08/01/22 1115)   Adequate oxygenation and airway patent    Complications related to anesthesia: None    Post-anesthesia assessment completed. No concerns    Signed By: Nikhil Hancock MD     August 1, 2022              Procedure(s):  EXPLORATORY LAPAROTOMY, RESECTION OF MASS, TOTAL ABDOMINAL HYSTEROSCOPY, BILATERAL SALPINGO-OOPHORECTOMY, OMENTECTOMY, AND PROCTOSCOPY. general    <BSHSIANPOST>    INITIAL Post-op Vital signs:   Vitals Value Taken Time   /75 08/01/22 1130   Temp 36.4 °C (97.6 °F) 08/01/22 1130   Pulse 65 08/01/22 1139   Resp 15 08/01/22 1139   SpO2 97 % 08/01/22 1139   Vitals shown include unvalidated device data.

## 2022-08-01 NOTE — PERIOP NOTES
SURGICEL POWDER WAS GIVEN TO THE STERILE FIELD TO BE USED BY MD DURING PROCEDURE  REF: [id number]  LOT: [id number]  EXP: 10/31/2023

## 2022-08-02 LAB
ANION GAP SERPL CALC-SCNC: 7 MMOL/L (ref 5–15)
BASOPHILS # BLD: 0 K/UL (ref 0–0.1)
BASOPHILS NFR BLD: 0 % (ref 0–1)
BUN SERPL-MCNC: 4 MG/DL (ref 6–20)
BUN/CREAT SERPL: 10 (ref 12–20)
CALCIUM SERPL-MCNC: 7.3 MG/DL (ref 8.5–10.1)
CHLORIDE SERPL-SCNC: 108 MMOL/L (ref 97–108)
CO2 SERPL-SCNC: 23 MMOL/L (ref 21–32)
CREAT SERPL-MCNC: 0.4 MG/DL (ref 0.55–1.02)
DIFFERENTIAL METHOD BLD: ABNORMAL
EOSINOPHIL # BLD: 0.1 K/UL (ref 0–0.4)
EOSINOPHIL NFR BLD: 2 % (ref 0–7)
ERYTHROCYTE [DISTWIDTH] IN BLOOD BY AUTOMATED COUNT: 13.5 % (ref 11.5–14.5)
GLUCOSE BLD STRIP.AUTO-MCNC: 185 MG/DL (ref 65–117)
GLUCOSE BLD STRIP.AUTO-MCNC: 238 MG/DL (ref 65–117)
GLUCOSE BLD STRIP.AUTO-MCNC: 239 MG/DL (ref 65–117)
GLUCOSE BLD STRIP.AUTO-MCNC: 265 MG/DL (ref 65–117)
GLUCOSE SERPL-MCNC: 239 MG/DL (ref 65–100)
HCT VFR BLD AUTO: 28.7 % (ref 35–47)
HGB BLD-MCNC: 9.3 G/DL (ref 11.5–16)
IMM GRANULOCYTES # BLD AUTO: 0 K/UL (ref 0–0.04)
IMM GRANULOCYTES NFR BLD AUTO: 0 % (ref 0–0.5)
LYMPHOCYTES # BLD: 0.7 K/UL (ref 0.8–3.5)
LYMPHOCYTES NFR BLD: 12 % (ref 12–49)
MCH RBC QN AUTO: 28.9 PG (ref 26–34)
MCHC RBC AUTO-ENTMCNC: 32.4 G/DL (ref 30–36.5)
MCV RBC AUTO: 89.1 FL (ref 80–99)
MONOCYTES # BLD: 0.5 K/UL (ref 0–1)
MONOCYTES NFR BLD: 9 % (ref 5–13)
NEUTS SEG # BLD: 4.2 K/UL (ref 1.8–8)
NEUTS SEG NFR BLD: 77 % (ref 32–75)
NRBC # BLD: 0 K/UL (ref 0–0.01)
NRBC BLD-RTO: 0 PER 100 WBC
PLATELET # BLD AUTO: 195 K/UL (ref 150–400)
PMV BLD AUTO: 9.8 FL (ref 8.9–12.9)
POTASSIUM SERPL-SCNC: 3.9 MMOL/L (ref 3.5–5.1)
RBC # BLD AUTO: 3.22 M/UL (ref 3.8–5.2)
RBC MORPH BLD: ABNORMAL
SERVICE CMNT-IMP: ABNORMAL
SODIUM SERPL-SCNC: 138 MMOL/L (ref 136–145)
WBC # BLD AUTO: 5.5 K/UL (ref 3.6–11)

## 2022-08-02 PROCEDURE — 74011250636 HC RX REV CODE- 250/636: Performed by: OBSTETRICS & GYNECOLOGY

## 2022-08-02 PROCEDURE — 82962 GLUCOSE BLOOD TEST: CPT

## 2022-08-02 PROCEDURE — 74011250637 HC RX REV CODE- 250/637: Performed by: OBSTETRICS & GYNECOLOGY

## 2022-08-02 PROCEDURE — 65410000002 HC RM PRIVATE OB

## 2022-08-02 PROCEDURE — 80048 BASIC METABOLIC PNL TOTAL CA: CPT

## 2022-08-02 PROCEDURE — 85025 COMPLETE CBC W/AUTO DIFF WBC: CPT

## 2022-08-02 PROCEDURE — 74011636637 HC RX REV CODE- 636/637: Performed by: OBSTETRICS & GYNECOLOGY

## 2022-08-02 PROCEDURE — 36415 COLL VENOUS BLD VENIPUNCTURE: CPT

## 2022-08-02 PROCEDURE — 74011250637 HC RX REV CODE- 250/637: Performed by: PHYSICIAN ASSISTANT

## 2022-08-02 RX ORDER — DOCUSATE SODIUM 100 MG/1
100 CAPSULE, LIQUID FILLED ORAL DAILY
Status: DISCONTINUED | OUTPATIENT
Start: 2022-08-02 | End: 2022-08-03 | Stop reason: HOSPADM

## 2022-08-02 RX ORDER — SIMETHICONE 80 MG
80 TABLET,CHEWABLE ORAL
Status: DISCONTINUED | OUTPATIENT
Start: 2022-08-02 | End: 2022-08-03 | Stop reason: HOSPADM

## 2022-08-02 RX ORDER — OXYCODONE HYDROCHLORIDE 5 MG/1
5 TABLET ORAL
Status: DISCONTINUED | OUTPATIENT
Start: 2022-08-02 | End: 2022-08-03 | Stop reason: HOSPADM

## 2022-08-02 RX ORDER — TRAMADOL HYDROCHLORIDE 50 MG/1
50 TABLET ORAL
Status: DISCONTINUED | OUTPATIENT
Start: 2022-08-02 | End: 2022-08-03 | Stop reason: HOSPADM

## 2022-08-02 RX ORDER — IBUPROFEN 400 MG/1
800 TABLET ORAL
Status: DISCONTINUED | OUTPATIENT
Start: 2022-08-02 | End: 2022-08-03 | Stop reason: HOSPADM

## 2022-08-02 RX ADMIN — TRAMADOL HYDROCHLORIDE 50 MG: 50 TABLET ORAL at 12:37

## 2022-08-02 RX ADMIN — SIMETHICONE 80 MG: 80 TABLET, CHEWABLE ORAL at 10:09

## 2022-08-02 RX ADMIN — Medication 5 UNITS: at 17:28

## 2022-08-02 RX ADMIN — KETOROLAC TROMETHAMINE 30 MG: 30 INJECTION, SOLUTION INTRAMUSCULAR; INTRAVENOUS at 02:13

## 2022-08-02 RX ADMIN — IBUPROFEN 800 MG: 400 TABLET, FILM COATED ORAL at 21:49

## 2022-08-02 RX ADMIN — ENOXAPARIN SODIUM 40 MG: 100 INJECTION SUBCUTANEOUS at 17:28

## 2022-08-02 RX ADMIN — KETOROLAC TROMETHAMINE 30 MG: 30 INJECTION, SOLUTION INTRAMUSCULAR; INTRAVENOUS at 08:33

## 2022-08-02 RX ADMIN — SIMETHICONE 80 MG: 80 TABLET, CHEWABLE ORAL at 21:49

## 2022-08-02 RX ADMIN — METHIMAZOLE 10 MG: 5 TABLET ORAL at 08:33

## 2022-08-02 RX ADMIN — Medication 2 UNITS: at 21:49

## 2022-08-02 RX ADMIN — DOCUSATE SODIUM 100 MG: 100 CAPSULE, LIQUID FILLED ORAL at 10:45

## 2022-08-02 RX ADMIN — Medication 3 UNITS: at 12:31

## 2022-08-02 RX ADMIN — TRAMADOL HYDROCHLORIDE 50 MG: 50 TABLET ORAL at 18:57

## 2022-08-02 RX ADMIN — Medication 2 UNITS: at 07:19

## 2022-08-02 RX ADMIN — PANTOPRAZOLE SODIUM 40 MG: 40 TABLET, DELAYED RELEASE ORAL at 07:19

## 2022-08-02 RX ADMIN — IBUPROFEN 800 MG: 400 TABLET, FILM COATED ORAL at 15:43

## 2022-08-02 RX ADMIN — SODIUM CHLORIDE 125 ML/HR: 9 INJECTION, SOLUTION INTRAVENOUS at 02:13

## 2022-08-02 RX ADMIN — SODIUM CHLORIDE 125 ML/HR: 9 INJECTION, SOLUTION INTRAVENOUS at 10:45

## 2022-08-02 RX ADMIN — SIMETHICONE 80 MG: 80 TABLET, CHEWABLE ORAL at 15:43

## 2022-08-02 NOTE — PROGRESS NOTES
[address]  [address] [facility], [address]       GI PROGRESS NOTE  Yao Miriam, [facility], [place], operated by [facility] office  [phone] NP in-hospital cell phone M-F until 4:30  After 5pm or on weekends, please call  for physician on call      NAME: Virgen Alvarez   :  1980   MRN:  [id number]       Subjective:   Having gas pain, ambulating. Objective:     VITALS:   Last 24hrs VS reviewed since prior progress note. Most recent are:  Visit Vitals  /72 (BP 1 Location: Right upper arm, BP Patient Position: At rest)   Pulse 96   Temp 98.7 °F (37.1 °C)   Resp 16   Ht 5' 2\" (1.575 m)   Wt 93 kg (205 lb)   SpO2 100%   BMI 37.49 kg/m²       PHYSICAL EXAM:  General: Cooperative, no acute distress    Neurologic:  Alert and oriented X 3. HEENT: EOMI, no scleral icterus   Lungs:  No increased WOB  Heart:  Regular rate  Abdomen: Soft, non-distended, mild tenderness. Extremities: warm  Psych:   Good insight. Not anxious or agitated.     Lab Data Reviewed:     Recent Results (from the past 24 hour(s))   GLUCOSE, POC    Collection Time: 22  3:39 PM   Result Value Ref Range    Glucose (POC) 173 (H) 65 - 117 mg/dL    Performed by Suyapa Navarro    GLUCOSE, POC    Collection Time: 22  5:19 PM   Result Value Ref Range    Glucose (POC) 177 (H) 65 - 117 mg/dL    Performed by Harvey Gordon    GLUCOSE, POC    Collection Time: 22 10:04 PM   Result Value Ref Range    Glucose (POC) 214 (H) 65 - 117 mg/dL    Performed by Gilberto Watts    METABOLIC PANEL, BASIC    Collection Time: 22  4:11 AM   Result Value Ref Range    Sodium 138 136 - 145 mmol/L    Potassium 3.9 3.5 - 5.1 mmol/L    Chloride 108 97 - 108 mmol/L    CO2 23 21 - 32 mmol/L    Anion gap 7 5 - 15 mmol/L    Glucose 239 (H) 65 - 100 mg/dL    BUN 4 (L) 6 - 20 MG/DL    Creatinine 0.40 (L) 0.55 - 1.02 MG/DL    BUN/Creatinine ratio 10 (L) 12 - 20      GFR est AA >60 >60 ml/min/1.73m2    GFR est non-AA >60 >60 ml/min/1.73m2    Calcium 7.3 (L) 8.5 - 10.1 MG/DL   CBC WITH AUTOMATED DIFF    Collection Time: 08/02/22  4:11 AM   Result Value Ref Range    WBC 5.5 3.6 - 11.0 K/uL    RBC 3.22 (L) 3.80 - 5.20 M/uL    HGB 9.3 (L) 11.5 - 16.0 g/dL    HCT 28.7 (L) 35.0 - 47.0 %    MCV 89.1 80.0 - 99.0 FL    MCH 28.9 26.0 - 34.0 PG    MCHC 32.4 30.0 - 36.5 g/dL    RDW 13.5 11.5 - 14.5 %    PLATELET 302 282 - 416 K/uL    MPV 9.8 8.9 - 12.9 FL    NRBC 0.0 0  WBC    ABSOLUTE NRBC 0.00 0.00 - 0.01 K/uL    NEUTROPHILS 77 (H) 32 - 75 %    LYMPHOCYTES 12 12 - 49 %    MONOCYTES 9 5 - 13 %    EOSINOPHILS 2 0 - 7 %    BASOPHILS 0 0 - 1 %    IMMATURE GRANULOCYTES 0 0.0 - 0.5 %    ABS. NEUTROPHILS 4.2 1.8 - 8.0 K/UL    ABS. LYMPHOCYTES 0.7 (L) 0.8 - 3.5 K/UL    ABS. MONOCYTES 0.5 0.0 - 1.0 K/UL    ABS. EOSINOPHILS 0.1 0.0 - 0.4 K/UL    ABS. BASOPHILS 0.0 0.0 - 0.1 K/UL    ABS. IMM. GRANS. 0.0 0.00 - 0.04 K/UL    DF SMEAR SCANNED      RBC COMMENTS NORMOCYTIC, NORMOCHROMIC     GLUCOSE, POC    Collection Time: 08/02/22  7:11 AM   Result Value Ref Range    Glucose (POC) 185 (H) 65 - 117 mg/dL    Performed by Evan Marks             Assessment:     Rectosigmoid mass: CEA 70  Constipation/change in bowel habits  Pelvic mass     Patient Active Problem List   Diagnosis Code    Pneumonia, organism unspecified([address]) J18.9    Gall bladder stones K80.20    ANGULO (dyspnea on exertion) R06.00    Type 1 diabetes mellitus with complications (HCC) M16.7    Obesity (BMI 30-39. 9) E66.9    Obesity, morbid ([address] E66.01    Pelvic mass R19.00     Plan:     Plan for outpatient colonoscopy Monday, August 22 with Dr. Armand Perez, will need to hold anticoagulation and adjust insulin  Will sign off and be available to see again on request      Signed By: Kathy Mobley NP     8/2/2022  11:19 AM       GI Attending: Agree with above plan for outpatient colonoscopy. Will sign off for now. Please call with any questions. Oral Pathak MD

## 2022-08-02 NOTE — PROGRESS NOTES
Bedside and Verbal shift change report given to Uli Malone RN (oncoming nurse) by Rina Beal RN (offgoing nurse). Report included the following information SBAR.

## 2022-08-02 NOTE — PROGRESS NOTES
Bedside and Verbal shift change report given to Azeb Naranjo  (oncoming nurse) by Tana Favre RN  (offgoing nurse). Report included the following information SBAR, Kardex, Intake/Output, MAR, and Recent Results.

## 2022-08-02 NOTE — DISCHARGE INSTRUCTIONS
Call [phone] to schedule Surgery follow-up with Dr. Sarah King 1 week after colonoscopy to review colonoscopy results, timing of surgery. Continue MiraLAX twice daily, stay well-hydrated. ** [facility], [address]      [phone]    Plan for outpatient colonoscopy Monday, August 22 with Dr. David Atkinson. Hold Eliquis for 48 hours prior to colonoscopy. [address]  A department of [employer] [address], [address], [address]  P [phone]  F [phone]       Mila Washington      Dear Ms. Ankush Baker,      Please review your instructions with your nurse and ask any questions so you have all the information you need to recover well at home. If you do not feel you have everything you need to succeed and be safe after you leave the hospital, please discuss these concerns with your nurse. As always, call for any questions at home. Your doctor: Dr. Daysi Cosby  Diagnosis: Pelvic mass [R19.00]  Procedure: [unfilled]  Date of Discharge: 08/03/22        Take Home Medications     See Discharge Medication Review provided to you by your nurse. If you did not receive one, request this prior to your discharge. It is important that you take your medications as they are prescribed. Keep your medications in the bottles provided by the pharmacist and keep a list of the medication names, dosages, times to be taken and what they are for in your wallet. Do not take other medications without consulting your doctor. If you no longer need your prescribed pain medication, you may take Tylenol or Aleve alone. You should take a daily gentle stool softener such as a colace pill or dulcolax suppository for constipation as this is not uncommon after surgery and/or while on pain medication. If not prescribed, this can be found over the counter. If constipation persists for >24 hours you should take a dose of Milk of Magnesia.  Call if your constipation continues. Diet    Stay hydrated and drink fluids such as gatorade and water. This will also help prevent constipation and dehydration. Limit somewhat any usual caffeine intake of beverages such as soda, tea and coffee as this may serve to dehydrate you. For the first 2-3 days keep a low fiber diet avoiding raw vegetables or fruits with skin. A diet consisting of soup, cereal, yogurt, eggs, fish, Boost/Ensure. Avoid fatty/greasy foods. If nauseated, keep your diet limited to liquids and call if this persists. Activity    If possible, have someone with you at all times until you feel stable. Gradually increase your activity each day. There are generally no restrictions on walking, climbing stairs or riding in a car. Try to walk at least 4 times per day. Showers are okay. If you have an incision, no tub bathing/swimming for 3-4 weeks. No driving for 2 week and/or while on pain medication. There are no lifting restrictions if you did not have surgery. If you had surgery, the following restrictions are in place: If you had an open procedure, no heavy lifting greater than 15 lbs for the first 4 weeks and 25 lbs for the next 4 weeks. If you had a hysterectomy, nothing per vagina for 6-8 weeks. If you had any type of vaginal procedure, you may experience vaginal spotting. Should the bleeding require more that 4 pads a day please call our office. Incision    You should expect some discomfort in the area of your incision, particularly as you increase your activity. If you notice an area of increasing redness or new drainage, please call your doctor. Staples are generally removed in 10-14 days. Many incisions will have buried absorbable sutures, which do not need to be removed and are covered by protective glue. This will come off over time.       Causes For Concern    If any of the following occur, please call our office and speak with the Nurse/aid who will help you with your problem or ask the doctor to call you. Problems with the incision, including increasing pain, swelling, redness or drainage. Inability to pass urine   Increasing abdominal pain despite medication  Persisting nausea or vomiting. Fever or chills and a temperature >101F  Constipation (no bowel movement for three days). Diarrhea (more than three watery stools within 24 hours). Excessive vaginal or wound bleeding. If after hours and you cannot reach an on-call physician, call 911. Follow-Up    Call [phone] to schedule an appointment with Dr. Abdirahman Pacheco in 4 weeks. Please also make an appointment with Dr. Michell Albert nurse next week for staple removal        Information obtained by :  I understand that if any problems occur once I am at home I am to contact my physician. I understand and acknowledge receipt of the instructions indicated above.                                                                                                                                            Physician's or R.N.'s Signature                                                                  Date/Time                                                                                                                                              Patient or Representative Signature                                                          Date/Time

## 2022-08-02 NOTE — PROGRESS NOTES
[address] department of [address], [address]  P [phone]  F [phone]       Patient Name: Ankush Baker   Admit Date: 8/1/2022   OR Date: 8/1/2022   Visit Date: 8/2/2022        SUBJECTIVE:    No complaints this morning. Significant discomfort from maya catheter last night. Catheter repositioned and patient given ditropan with no improvement. Maya removed. She has been able to void. States that bladder discomfort resolved. Using mainly toradol and tylenol for pain overnight. Minimal use of PCA  Tolerated clear liquids. No nausea.   No flatus/bm    OBJECTIVE:    Patient Vitals for the past 24 hrs:   Temp Pulse Resp BP SpO2   08/02/22 0400 98.7 °F (37.1 °C) (!) 108 14 (!) 143/75 99 %   08/02/22 0000 98 °F (36.7 °C) 87 16 (!) 143/74 99 %   08/01/22 1927 97.5 °F (36.4 °C) 76 16 130/77 100 %   08/01/22 1524 97.7 °F (36.5 °C) 82 14 133/77 100 %   08/01/22 1418 97.8 °F (36.6 °C) 77 12 (!) 152/82 99 %   08/01/22 1302 97.7 °F (36.5 °C) 67 12 128/83 98 %   08/01/22 1207 97.7 °F (36.5 °C) 75 12 (!) 142/76 97 %   08/01/22 1130 97.6 °F (36.4 °C) 75 10 122/75 98 %   08/01/22 1115 -- 67 12 121/63 97 %   08/01/22 1058 -- 66 14 (!) 114/56 97 %   08/01/22 1050 -- 70 13 (!) 103/54 97 %   08/01/22 1045 -- 67 12 (!) 113/58 96 %   08/01/22 1040 -- 77 12 (!) 118/57 98 %   08/01/22 1036 97.9 °F (36.6 °C) 83 14 118/60 97 %   08/01/22 1035 -- 82 10 122/65 99 %       Date 08/01/22 0700 - 08/02/22 0659 08/02/22 0700 - 08/03/22 0659   Shift 1061-4221 4845-3462 24 Hour Total 3143-8131 1136-3668 24 Hour Total   INTAKE   I.V.(mL/kg/hr) 2056(3.0)  2020(0.9)        Volume (lactated Ringers infusion) 2000  2000        Volume (0.9% sodium chloride infusion) 0  0        Volume (cefOXitin (MEFOXIN) 2 g in sterile water (preservative free) 20 mL iv syringe) 20  20      Shift Total(mL/kg) 4142(25.6)  0566(23.5)      OUTPUT   Urine(mL/kg/hr) 1195(1.1) 450(0.4) 1645(0.7)        Urine Voided 20 450 470        Urine Output 350  350        Urine Output (mL) ([REMOVED] Urinary Catheter 08/01/22 2- way; Vigil) 825  825      Blood 600  600        Estimated Blood Loss 600  600      Shift Total(mL/kg) 1795(19.3) 450(4.8) 2245(24.1)       -450 -225      Weight (kg) 93 93 93 93 93 93       Physical Exam     General:  alert, cooperative, no distress     Cardiac:  Regular rate and rhythm        Lungs:  clear to auscultation bilaterally  Abdomen:  soft, non-tender, without masses or organomegaly       Wound:  clean, dry   Extremity: extremities normal, atraumatic, no cyanosis or edema    Data Review  Lab Results   Component Value Date/Time    WBC 5.5 08/02/2022 04:11 AM    ABS. NEUTROPHILS 4.2 08/02/2022 04:11 AM    HGB 9.3 (L) 08/02/2022 04:11 AM    HCT 28.7 (L) 08/02/2022 04:11 AM    MCV 89.1 08/02/2022 04:11 AM    MCH 28.9 08/02/2022 04:11 AM    PLATELET 547 77/88/6385 04:11 AM     Lab Results   Component Value Date/Time    Sodium 138 08/02/2022 04:11 AM    Potassium 3.9 08/02/2022 04:11 AM    Chloride 108 08/02/2022 04:11 AM    CO2 23 08/02/2022 04:11 AM    Glucose 239 (H) 08/02/2022 04:11 AM    BUN 4 (L) 08/02/2022 04:11 AM    Creatinine 0.40 (L) 08/02/2022 04:11 AM    Calcium 7.3 (L) 08/02/2022 04:11 AM    Albumin 3.3 (L) 07/27/2022 02:30 PM    Bilirubin, total 0.3 07/27/2022 02:30 PM    ALT (SGPT) 14 07/27/2022 02:30 PM    Alk. phosphatase 81 07/27/2022 02:30 PM     IMPRESSION/PLAN:    1 Day Post-Op Procedure(s):  EXPLORATORY LAPAROTOMY, RESECTION OF MASS, TOTAL ABDOMINAL HYSTEROSCOPY, BILATERAL SALPINGO-OOPHORECTOMY, OMENTECTOMY, AND PROCTOSCOPY for PELVIC MASS    Oncologic:  34 yo WF who presented to the ER with abdominal pain. She also reported constipation. A CT revealed a large pelvic mass measuring 20.2 x 12.7 x 17.4 cm. The mass filled the cul de sac and extended to the umbilicus.  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 On palpation of colon she was noted to have a very firm lesion of the upper rectum, consistent with a colorectal primary. The colon did not appear obstructed. No other appreciable peritoneal disease. Frozen pathology pending. Heme/CV:  Hgb 9.3 this morning. VSS. Hemodynamically stable   Renal:  Creatinine 0.40.  good urine output overnight. Voiding without a problem       FEN/GI:  No nausea. Complains of some gas pain. Simethicone ordered. Encourage OOB. Advance to regular diet today as long as no nausea. Toujeo insulin pen ordered at lunchtime with sliding scale for breakthrough   ID/Wound:  Afebrile. Abdomen and incision benign   PPX:  Lovenox while admitted. SCDs. Incentive spirometer. Continue OOB   Dispostion:  Doing well postoperatively. Continue routine post op care. D/c PCA today as she is not using it. Motrin ordered for pain though can also have ultram or oxycodone. Surgical findings discussed with patient yesterday. She has been seen by general surgery, medical oncology and GI. Would anticipate that she could have colonoscopy outpatient in about 2 weeks. We plan to send her home with anticoagulation for 28 days post op so would simply need to stop this prior to colonoscopy. Anticipate discharge in the next day or two as bowel function returns.         Manas North PA-C  8/2/2022  7:45 AM

## 2022-08-03 VITALS
HEIGHT: 62 IN | TEMPERATURE: 98 F | BODY MASS INDEX: 37.73 KG/M2 | SYSTOLIC BLOOD PRESSURE: 113 MMHG | HEART RATE: 91 BPM | WEIGHT: 205 LBS | RESPIRATION RATE: 16 BRPM | OXYGEN SATURATION: 99 % | DIASTOLIC BLOOD PRESSURE: 70 MMHG

## 2022-08-03 LAB
GLUCOSE BLD STRIP.AUTO-MCNC: 165 MG/DL (ref 65–117)
GLUCOSE BLD STRIP.AUTO-MCNC: 221 MG/DL (ref 65–117)
SERVICE CMNT-IMP: ABNORMAL
SERVICE CMNT-IMP: ABNORMAL

## 2022-08-03 PROCEDURE — 74011250637 HC RX REV CODE- 250/637: Performed by: PHYSICIAN ASSISTANT

## 2022-08-03 PROCEDURE — 74011250637 HC RX REV CODE- 250/637: Performed by: OBSTETRICS & GYNECOLOGY

## 2022-08-03 PROCEDURE — 74011636637 HC RX REV CODE- 636/637: Performed by: OBSTETRICS & GYNECOLOGY

## 2022-08-03 PROCEDURE — 82962 GLUCOSE BLOOD TEST: CPT

## 2022-08-03 PROCEDURE — 74011000250 HC RX REV CODE- 250: Performed by: PHYSICIAN ASSISTANT

## 2022-08-03 PROCEDURE — 74011250637 HC RX REV CODE- 250/637: Performed by: SURGERY

## 2022-08-03 PROCEDURE — 74011250636 HC RX REV CODE- 250/636: Performed by: OBSTETRICS & GYNECOLOGY

## 2022-08-03 RX ORDER — LIDOCAINE 4 G/100G
2 PATCH TOPICAL EVERY 24 HOURS
Status: DISCONTINUED | OUTPATIENT
Start: 2022-08-03 | End: 2022-08-03 | Stop reason: HOSPADM

## 2022-08-03 RX ORDER — TRAMADOL HYDROCHLORIDE 50 MG/1
50 TABLET ORAL
Qty: 20 TABLET | Refills: 0 | Status: SHIPPED | OUTPATIENT
Start: 2022-08-03 | End: 2022-08-08

## 2022-08-03 RX ORDER — POLYETHYLENE GLYCOL 3350 17 G/17G
17 POWDER, FOR SOLUTION ORAL 2 TIMES DAILY
Qty: 60 PACKET | Refills: 0 | Status: SHIPPED | OUTPATIENT
Start: 2022-08-03 | End: 2022-09-02

## 2022-08-03 RX ORDER — DOCUSATE SODIUM 100 MG/1
100 CAPSULE, LIQUID FILLED ORAL DAILY
Qty: 30 CAPSULE | Refills: 2 | Status: ON HOLD | OUTPATIENT
Start: 2022-08-04 | End: 2022-08-22

## 2022-08-03 RX ORDER — IBUPROFEN 800 MG/1
800 TABLET ORAL
Qty: 40 TABLET | Refills: 0 | Status: SHIPPED | OUTPATIENT
Start: 2022-08-03 | End: 2022-08-22

## 2022-08-03 RX ORDER — POLYETHYLENE GLYCOL 3350 17 G/17G
17 POWDER, FOR SOLUTION ORAL 2 TIMES DAILY
Status: DISCONTINUED | OUTPATIENT
Start: 2022-08-03 | End: 2022-08-03 | Stop reason: HOSPADM

## 2022-08-03 RX ADMIN — IBUPROFEN 800 MG: 400 TABLET, FILM COATED ORAL at 15:32

## 2022-08-03 RX ADMIN — IBUPROFEN 800 MG: 400 TABLET, FILM COATED ORAL at 04:27

## 2022-08-03 RX ADMIN — ENOXAPARIN SODIUM 40 MG: 100 INJECTION SUBCUTANEOUS at 17:23

## 2022-08-03 RX ADMIN — TRAMADOL HYDROCHLORIDE 50 MG: 50 TABLET ORAL at 06:58

## 2022-08-03 RX ADMIN — SIMETHICONE 80 MG: 80 TABLET, CHEWABLE ORAL at 08:46

## 2022-08-03 RX ADMIN — Medication 2 UNITS: at 06:58

## 2022-08-03 RX ADMIN — METHIMAZOLE 10 MG: 5 TABLET ORAL at 08:43

## 2022-08-03 RX ADMIN — PANTOPRAZOLE SODIUM 40 MG: 40 TABLET, DELAYED RELEASE ORAL at 06:58

## 2022-08-03 RX ADMIN — TRAMADOL HYDROCHLORIDE 50 MG: 50 TABLET ORAL at 13:05

## 2022-08-03 RX ADMIN — POLYETHYLENE GLYCOL 3350 17 G: 17 POWDER, FOR SOLUTION ORAL at 16:54

## 2022-08-03 RX ADMIN — TRAMADOL HYDROCHLORIDE 50 MG: 50 TABLET ORAL at 18:57

## 2022-08-03 RX ADMIN — SIMETHICONE 80 MG: 80 TABLET, CHEWABLE ORAL at 15:33

## 2022-08-03 RX ADMIN — TRAMADOL HYDROCHLORIDE 50 MG: 50 TABLET ORAL at 00:59

## 2022-08-03 RX ADMIN — DOCUSATE SODIUM 100 MG: 100 CAPSULE, LIQUID FILLED ORAL at 08:43

## 2022-08-03 RX ADMIN — Medication 3 UNITS: at 12:32

## 2022-08-03 NOTE — PROGRESS NOTES
Physician Progress Note      Farooq Lopez  CSN #:                  [id number]  :                       1980  ADMIT DATE:       2022 6:17 AM  DISCH DATE:  RESPONDING  PROVIDER #:        Matilda LAYNE MD          QUERY TEXT:    Patient admitted with pelvic mass . Pt noted to have documented type 1 diabetes Please document in progress notes and discharge summary further specificity regarding the control status of DM:         The medical record reflects the following:  Risk Factors: diabetes  Clinical Indicators:  22 07:11  GLUCOSE,FAST - POC: 185 (H)    22 12:20  GLUCOSE,FAST - POC: 239 (H)    22 17:12  GLUCOSE,FAST - POC: 265 (H)    22 04:11  Glucose: 239 (H)        Treatment: POC glucose monitoring, lispro sliding scale insulin      Thank you,  Erma Molina RN, CDI, [facility], [place], operated by [facility], [address] [place] [address]  Certified Clinical   [phone]  Options provided:  -- Hyperglycemia due to DM type I  -- Hyperglycemia not related to DM type I  -- Other - I will add my own diagnosis  -- Disagree - Not applicable / Not valid  -- Disagree - Clinically unable to determine / Unknown  -- Refer to Clinical Documentation Reviewer    PROVIDER RESPONSE TEXT:    This patient has Hyperglycemia due to DM type I.    Query created by: Lee Ivory on 8/3/2022 7:35 AM      Electronically signed by:  Matilda Chicas MD 8/3/2022 9:52 AM

## 2022-08-03 NOTE — PROGRESS NOTES
Spiritual Care Partner Volunteer visited patient in [facility] on 8.3.22.     Documented by Harshaqiana Bocanegra, Staff , on 8.3.22  Please call Rei-RICHARD ([phone]) to page  if needed

## 2022-08-03 NOTE — PROGRESS NOTES
Bedside and Verbal shift change report given to Claudio De Souza RN  (oncoming nurse) by Jones Fallon RN  (offgoing nurse). Report included the following information SBAR, Kardex, Intake/Output, MAR, and Recent Results. 1740- I have reviewed discharge instructions with the patient. The patient and spouse verbalized understanding. Pt given copy of discharge instructions, updated on medication times, and given extra dressing supplies.

## 2022-08-03 NOTE — PROGRESS NOTES
Bedside and Verbal shift change report given to EDDIE Abbott RN (oncoming nurse) by Bessy Page RN (offgoing nurse). Report included the following information SBAR and Kardex.

## 2022-08-03 NOTE — PROGRESS NOTES
No nausea today. Tolerating regular diet. Flatus overnight. Little today. Does not feel bloated/distended. Pushing to go home to her children. Emotionally exhausted. Discussed bland diet. If develops nausea or distension, understands to limit diet to clears/liquids. Continue miralax BID as her GI. Continue PO fluids. Understands signs/symptoms of ileus and when to call the office. Discharge instructions reviewed with patient and her . She will call the office if she develops and problems.     [address], [address]

## 2022-08-04 NOTE — DISCHARGE SUMMARY
[address] [facility] department of [facility] [address] Josie Mathias Moritz [address]  [phone] GBF [phone]        Discharge Summary  Patient ID:  Ana María Feldman  [id number]  2 y.o.  1980    Admit date: 8/1/2022    PCP: Alysha Haney, NASREEN    Admit MD: Maribel Iglesias MD    Discharge MD: Maribel Iglesias MD    Discharge date and time: 8/3/2022  7:12 PM    Admission Diagnoses:     Pelvic mass [R19.00]  Patient Active Problem List   Diagnosis Code    Pneumonia, organism unspecified(486) J18.9    Gall bladder stones K80.20    ANGULO (dyspnea on exertion) R06.00    Type 1 diabetes mellitus with complications ([address] F84.6    Obesity (BMI 30-39. 9) E66.9    Obesity, morbid ([address] E66.01    Pelvic mass R19.00       Discharge Diagnoses:      same    OR Date: 8/1/2022  OR procedure: Procedure(s):  EXPLORATORY LAPAROTOMY, RESECTION OF MASS, TOTAL ABDOMINAL HYSTEROSCOPY, BILATERAL SALPINGO-OOPHORECTOMY, OMENTECTOMY, AND PROCTOSCOPY    Hospital Course:   34 yo WF who presented to the ER with abdominal pain. She also reported constipation. A CT revealed a large pelvic mass measuring 20.2 x 12.7 x 17.4 cm. The mass filled the cul de sac and extended to the umbilicus. She was admitted and underwent the above procedures. See op note for details, but briefly:  Large, complex, solid/cystic mass arising from the left ovary. The mass was adherent within the cul de sac and extended to the umbilicus. Normal-appearing uterus. Normal-appearing right tube and ovary. On palpation of colon she was noted to have a very firm lesion of the upper rectum, consistent with a colorectal primary. The colon did not appear obstructed. No other appreciable peritoneal disease. The remainder of the colon and small bowel appeared normal.  Atrophic vestigal appendix. Diaphragmatic surfaces smooth. No appreciable liver lesions.     Frozen section revealed Frozen section pathology \"consistent with a mucinous tumor, likely malignant\" and final path returned as below. She was admitted post operatively for post op management. Due to the rectal lesion found intraoperatively, general surgery was consulted as well as medical oncology. GI also saw the patient to discuss scheduling a colonoscopy. The patient was started on a regular diet on POD 1. She was tolerating the diet well with no nausea. She did pass a small amount of flatus overnight in to POD 2. She was ambulating independently. Pain was well controlled. Patient was anxious to get home. Discussed signs and symptoms of ileus and when to call the office with problems. She was discharged home on POD 2. Pathology: not yet available    Consults: GI, General Surgery, and Medical Oncology    Significant Diagnostic Studies:  Lab Results   Component Value Date/Time    WBC 5.5 08/02/2022 04:11 AM    ABS. NEUTROPHILS 4.2 08/02/2022 04:11 AM    HGB 9.3 (L) 08/02/2022 04:11 AM    HCT 28.7 (L) 08/02/2022 04:11 AM    MCV 89.1 08/02/2022 04:11 AM    MCH 28.9 08/02/2022 04:11 AM    PLATELET 381 20/90/1549 04:11 AM     Lab Results   Component Value Date/Time    Sodium 138 08/02/2022 04:11 AM    Potassium 3.9 08/02/2022 04:11 AM    Chloride 108 08/02/2022 04:11 AM    CO2 23 08/02/2022 04:11 AM    Glucose 239 (H) 08/02/2022 04:11 AM    BUN 4 (L) 08/02/2022 04:11 AM    Creatinine 0.40 (L) 08/02/2022 04:11 AM    Calcium 7.3 (L) 08/02/2022 04:11 AM    Albumin 3.3 (L) 07/27/2022 02:30 PM    Bilirubin, total 0.3 07/27/2022 02:30 PM    ALT (SGPT) 14 07/27/2022 02:30 PM    Alk. phosphatase 81 07/27/2022 02:30 PM       Disposition: home    Patient Instructions:   Discharge Medication List as of 8/3/2022  6:34 PM        START taking these medications    Details   docusate sodium (COLACE) 100 mg capsule Take 1 Capsule by mouth in the morning for 90 days. , Normal, Disp-30 Capsule, R-2      traMADoL (ULTRAM) 50 mg tablet Take 1 Tablet by mouth every six (6) hours as needed for Pain for up to 5 days. Max Daily Amount: 200 mg., Normal, Disp-20 Tablet, R-0      ibuprofen (MOTRIN) 800 mg tablet Take 1 Tablet by mouth every eight (8) hours as needed for Pain., Normal, Disp-40 Tablet, R-0      polyethylene glycol (MIRALAX) 17 gram packet Take 1 Packet by mouth two (2) times a day for 30 days. , Normal, Disp-60 Packet, R-0      apixaban (ELIQUIS) 2.5 mg tablet Take 1 Tablet by mouth two (2) times a day. Indications: deep vein thrombosis prevention, Normal, Disp-26 Tablet, R-0           CONTINUE these medications which have NOT CHANGED    Details   methIMAzole (TAPAZOLE) 10 mg tablet Take 10 mg by mouth in the morning., Historical Med      FIASP FLEXTOUCH U-100 INSULIN 100 unit/mL (3 mL) inpn Historical Med, BRIAN      Magnesium Oxide 500 mg cap Take  by mouth., Historical Med      pantoprazole (PROTONIX) 40 mg tablet take 1 tablet by mouth once daily, Normal, Disp-90 Tab, R-4      albuterol (PROVENTIL HFA, VENTOLIN HFA, PROAIR HFA) 90 mcg/actuation inhaler Take 2 Puffs by inhalation every four (4) hours as needed for Wheezing., Normal, Disp-1 Inhaler, R-1      TOUJEO SOLOSTAR 300 unit/mL (1.5 mL) inpn 46 Units by SubCUTAneous route daily. , Historical Med, BRIAN      NOVOFINE 30 30 gauge x 1/3\" Historical Med, R-2, BRIAN      ONETOUCH VERIO strip Historical Med, R-4, BRIAN      PNV #31/JLPJEAK FUM/FOLIC ACID (PRENATAL MULTIVIT WITH IRON PO) Take  by mouth.  , Historical Med           STOP taking these medications       naproxen (NAPROSYN) 500 mg tablet Comments:   Reason for Stopping:             Activity: no driving for 4 weeks and/or while on analgesics, no heavy lifting for 6 weeks.   Nothing per vagina for 6-8 weeks  Walk four or more times daily  Showers okay, no tub bathing for 2 weeks if surgery  Stool softner for constipation  Diet: Regular Diet and Encourage fluids  Wound Care: Keep wound clean and dry, Reinforce dressing PRN, and Ice to area for comfort    Follow-up with Dr. Bailey Farley in 4 akhil and Dr. Eldridge Mcburney nurse in 10 days for staple removal.    The patient has an appointment for a colonoscopy in later August and understands that she needs to see general surgery about a week after her colonoscopy to discuss the results.      Signed:  Syed Matta PA-C  8/4/2022/10:54 AM

## 2022-08-11 ENCOUNTER — TELEPHONE (OUTPATIENT)
Dept: ONCOLOGY | Age: 42
End: 2022-08-11

## 2022-08-12 DIAGNOSIS — R19.00 PELVIC MASS: Primary | ICD-10-CM

## 2022-08-15 ENCOUNTER — OFFICE VISIT (OUTPATIENT)
Dept: GYNECOLOGY | Age: 42
End: 2022-08-15
Payer: MEDICAID

## 2022-08-15 ENCOUNTER — PATIENT MESSAGE (OUTPATIENT)
Dept: GYNECOLOGY | Age: 42
End: 2022-08-15

## 2022-08-15 DIAGNOSIS — Z48.02: Primary | ICD-10-CM

## 2022-08-15 PROCEDURE — 99024 POSTOP FOLLOW-UP VISIT: CPT | Performed by: OBSTETRICS & GYNECOLOGY

## 2022-08-18 ENCOUNTER — OFFICE VISIT (OUTPATIENT)
Dept: GYNECOLOGY | Age: 42
End: 2022-08-18
Payer: MEDICAID

## 2022-08-18 DIAGNOSIS — C20 RECTAL CANCER (HCC): Primary | ICD-10-CM

## 2022-08-18 DIAGNOSIS — C79.62 SECONDARY ADENOCARCINOMA OF LEFT OVARY (HCC): ICD-10-CM

## 2022-08-18 PROCEDURE — 99024 POSTOP FOLLOW-UP VISIT: CPT | Performed by: OBSTETRICS & GYNECOLOGY

## 2022-08-18 NOTE — PROGRESS NOTES
27 [facility] Mathias Moritz [address]  P [phone]  F [phone]    Postoperative Office Note  Patient ID:  Name: Anabel Tee  MRM: [id number]  : 1980/41 y.o. Date: 2022    Diagnosis:     ICD-10-CM ICD-9-CM    1. Rectal cancer (HCC)  C20 154.1       2. 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 9) E66.9    Obesity, morbid ([address] E66.01    Pelvic mass R19.00       SUBJECTIVE:    Anabel Tee is a 35 y.o. female who presents for followup postoperative care following exploratory laparotomy, resection of mass, total abdominal hysterectomy, bilateral salpingooophorectomy, omentectomy, appendectomy, proctoscopy. Operative findings:  Large, complex, solid/cystic mass arising from the left ovary. The mass was adherent within the cul de sac and extended to the umbilicus. Normal-appearing uterus. Normal-appearing right tube and ovary. 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 On palpation of colon she was noted to have a very firm lesion of the upper rectum, consistent with a colorectal primary. The colon did not appear obstructed. No other appreciable peritoneal disease. The remainder of the colon and small bowel appeared normal.  Atrophic vestigal appendix. Diaphragmatic surfaces smooth. No appreciable liver lesions. Unable to visualize the lesion by proctoscopy  due to large amount of stool. General surgery consulted intraoperatively. They recommended not doing the colon resection at this time, since she was not adequately prepped and would need a colostomy. She might also benefit from some neoadjuvant therapy.      The patient's pathology revealed: FINAL PATHOLOGIC DIAGNOSIS   1. Ovary and fallopian tube, left, salpingectomy and oophorectomy:   Metastatic colorectal adenocarcinoma, see comment. Tumor involves ovary and fallopian tube. 2. Infundibulopelvic ligament, left, resection:   Benign fibrovascular and adipose tissue. No malignancy identified. 3. Peritoneum, cul-de-sac, resection:   Metastatic colorectal adenocarcinoma. Peritoneal inclusion cyst.   4. Uterus with cervix, right ovary, right fallopian tube, hysterectomy, right salpingectomy and oophorectomy:   Metastatic colorectal adenocarcinoma on serosal surface. Cervix with no histological abnormalities. Benign secretory endometrium, negative for hyperplasia and malignancy. Myometrium with benign leiomyoma. Right ovary with benign cystic follicles. Benign fallopian tube with fimbria. 5. Omentum, omentectomy:   Benign omental tissue with reactive mesothelial cells. No malignancy identified. 6. Appendix, appendectomy:   Fibrous obliteration of lumen. No malignancy identified. Comment   Immunohistochemical stains (pancytokeratin, CK 7, CK 20, CDX2, PAX8, p16, p53) were performed on block 1E with appropriately reactive controls. Immunohistochemical stain (SATB2) was performed on block 1J at appCREAR with appropriately reactive controls. The tumor is positive for pancytokeratin, CK 20, CDX2, p53, SATB2, and negative for the remaining stains. This is consistent with a metastatic colorectal adenocarcinoma. Clinical correlation is recommended. CYTOLOGIC INTERPRETATION   Pelvic Washing, ThinPrep   Atypical cell groups (see comment)   General Categorization   Atypical, favor neoplastic process. Specimen Adequacy   Satisfactory for evaluation. Comment   Atypical cell groups are worrisome for involvement by neoplastic cells. Correlation with pending surgical pathology results is recommended ([id number]).        She had her staples removed 3 days ago. She called last night reporting that a portion of her incision opened up and there was some blood. I instructed her to come in for evaluation. Medications:     Current Outpatient Medications on File Prior to Visit   Medication Sig Dispense Refill    docusate sodium (COLACE) 100 mg capsule Take 1 Capsule by mouth in the morning for 90 days. 30 Capsule 2    ibuprofen (MOTRIN) 800 mg tablet Take 1 Tablet by mouth every eight (8) hours as needed for Pain. 40 Tablet 0    polyethylene glycol (MIRALAX) 17 gram packet Take 1 Packet by mouth two (2) times a day for 30 days. 60 Packet 0    apixaban (ELIQUIS) 2.5 mg tablet Take 1 Tablet by mouth two (2) times a day. Indications: deep vein thrombosis prevention 26 Tablet 0    methIMAzole (TAPAZOLE) 10 mg tablet Take 10 mg by mouth in the morning. FIASP FLEXTOUCH U-100 INSULIN 100 unit/mL (3 mL) inpn       Magnesium Oxide 500 mg cap Take  by mouth.      pantoprazole (PROTONIX) 40 mg tablet take 1 tablet by mouth once daily 90 Tab 4    albuterol (PROVENTIL HFA, VENTOLIN HFA, PROAIR HFA) 90 mcg/actuation inhaler Take 2 Puffs by inhalation every four (4) hours as needed for Wheezing. 1 Inhaler 1    TOUJEO SOLOSTAR 300 unit/mL (1.5 mL) inpn 46 Units by SubCUTAneous route daily. NOVOFINE 30 30 gauge x 1/3\"   2    ONETOUCH VERIO strip   4    PNV #50/KAKXDLI FUM/FOLIC ACID (PRENATAL MULTIVIT WITH IRON PO) Take  by mouth. No current facility-administered medications on file prior to visit. Allergies:      Allergies   Allergen Reactions    Aspirin Itching    Orange Swelling    Sulfa (Sulfonamide Antibiotics) Nausea and Vomiting    Sudafed [Pseudoephedrine Hcl] Other (comments)     Severe head ache     Past Medical History:   Diagnosis Date    Asthma     Diabetes ([address]     Gastrointestinal disorder     GERD    GERD (gastroesophageal reflux disease)     Thyroid disease      Past Surgical History:   Procedure Laterality Date    HX  SECTION 2012    HX  SECTION  2015    HX CHOLECYSTECTOMY  2013    HX HEENT      eye surgery    HX LIPOMA RESECTION  2010    HX ORTHOPAEDIC      Carpul jesus manuel    HX OTHER SURGICAL      lipoma removal     Social History     Socioeconomic History    Marital status:      Spouse name: Not on file    Number of children: Not on file    Years of education: Not on file    Highest education level: Not on file   Occupational History     Employer: self employed   Tobacco Use    Smoking status: Never    Smokeless tobacco: Never   Vaping Use    Vaping Use: Never used   Substance and Sexual Activity    Alcohol use: No     Alcohol/week: 0.0 standard drinks    Drug use: No    Sexual activity: Yes     Partners: Male     Birth control/protection: Condom     Comment:  has 2 children   Other Topics Concern    Not on file   Social History Narrative    Not on file     Social Determinants of Health     Financial Resource Strain: Not on file   Food Insecurity: Not on file   Transportation Needs: Not on file   Physical Activity: Not on file   Stress: Not on file   Social Connections: Not on file   Intimate Partner Violence: Not on file   Housing Stability: Not on file       OBJECTIVE:    Vitals: There were no vitals filed for this visit. Physical Examination:     General:  alert, cooperative, no distress   Lungs: lungs clear to auscultation, no accessory muscle use   Cardiac: Regular rate and rhythm   Abdomen: soft, bowel sounds active, non-tender  No CVA tenderness   Incision: Incision remains intact, but with a small area of bloody drainage at the lower aspect. No appreciable collection underneath. Pelvic: Deferred   Rectal: Deferred   Extremity:   extremities normal, atraumatic, no cyanosis or edema     IMPRESSION:    Hyun Randle is doing well postoperatively. She has a diagnosis of metastatic colorectal carcinoma. No evidence of incisional separation or infection.     Medical problems:     Patient Active Problem List   Diagnosis Code    Pneumonia, organism unspecified(486) J18.9    Gall bladder stones K80.20    ANGULO (dyspnea on exertion) R06.00    Type 1 diabetes mellitus with complications (HCC) M99.9    Obesity (BMI 30-39. 9) E66.9    Obesity, morbid ([facility] E66.01    Pelvic mass R19.00       PLAN:    The operative procedures and clinical results have been reviewed with the patient. Implications of diagnosis discussed at length. All questions answered. She has follow-up appointments scheduled with medical oncology and general surgery. I reassured her that her incision looks OK. She was instructed to call if the incision changes. I will see the patient back in a few weeks for her previously scheduled postoperative exam.  We will perform a pelvic exam at that time. The patient is advised to call our office with any problems or concerns. An electronic signature was used to sign this note.     Karen Escobar MD  8/18/2022

## 2022-08-22 ENCOUNTER — ANESTHESIA (OUTPATIENT)
Dept: ENDOSCOPY | Age: 42
End: 2022-08-22
Payer: MEDICAID

## 2022-08-22 ENCOUNTER — ANESTHESIA EVENT (OUTPATIENT)
Dept: ENDOSCOPY | Age: 42
End: 2022-08-22
Payer: MEDICAID

## 2022-08-22 ENCOUNTER — HOSPITAL ENCOUNTER (OUTPATIENT)
Age: 42
Setting detail: OUTPATIENT SURGERY
Discharge: HOME | End: 2022-08-22
Attending: INTERNAL MEDICINE | Admitting: INTERNAL MEDICINE
Payer: MEDICAID

## 2022-08-22 VITALS
HEIGHT: 62 IN | HEART RATE: 94 BPM | RESPIRATION RATE: 15 BRPM | OXYGEN SATURATION: 100 % | WEIGHT: 195 LBS | BODY MASS INDEX: 35.88 KG/M2 | TEMPERATURE: 97.8 F | DIASTOLIC BLOOD PRESSURE: 88 MMHG | SYSTOLIC BLOOD PRESSURE: 144 MMHG

## 2022-08-22 PROCEDURE — 2709999900 HC NON-CHARGEABLE SUPPLY: Performed by: INTERNAL MEDICINE

## 2022-08-22 PROCEDURE — 74011250636 HC RX REV CODE- 250/636: Performed by: NURSE ANESTHETIST, CERTIFIED REGISTERED

## 2022-08-22 PROCEDURE — 74011000250 HC RX REV CODE- 250: Performed by: NURSE ANESTHETIST, CERTIFIED REGISTERED

## 2022-08-22 PROCEDURE — 76040000007: Performed by: INTERNAL MEDICINE

## 2022-08-22 PROCEDURE — 77030019988 HC FCPS ENDOSC DISP BSC -B: Performed by: INTERNAL MEDICINE

## 2022-08-22 PROCEDURE — 77030040684 HC NDL ENDOSC NEEDLEMASTR OCOA -B: Performed by: INTERNAL MEDICINE

## 2022-08-22 PROCEDURE — 88360 TUMOR IMMUNOHISTOCHEM/MANUAL: CPT

## 2022-08-22 PROCEDURE — 76060000032 HC ANESTHESIA 0.5 TO 1 HR: Performed by: INTERNAL MEDICINE

## 2022-08-22 PROCEDURE — 88305 TISSUE EXAM BY PATHOLOGIST: CPT

## 2022-08-22 RX ORDER — SODIUM CHLORIDE 0.9 % (FLUSH) 0.9 %
5-40 SYRINGE (ML) INJECTION EVERY 8 HOURS
Status: DISCONTINUED | OUTPATIENT
Start: 2022-08-22 | End: 2022-08-22 | Stop reason: HOSPADM

## 2022-08-22 RX ORDER — SODIUM CHLORIDE 0.9 % (FLUSH) 0.9 %
5-40 SYRINGE (ML) INJECTION AS NEEDED
Status: DISCONTINUED | OUTPATIENT
Start: 2022-08-22 | End: 2022-08-22 | Stop reason: HOSPADM

## 2022-08-22 RX ORDER — FLUMAZENIL 0.1 MG/ML
0.2 INJECTION INTRAVENOUS
Status: DISCONTINUED | OUTPATIENT
Start: 2022-08-22 | End: 2022-08-22 | Stop reason: HOSPADM

## 2022-08-22 RX ORDER — NALOXONE HYDROCHLORIDE 0.4 MG/ML
0.4 INJECTION, SOLUTION INTRAMUSCULAR; INTRAVENOUS; SUBCUTANEOUS
Status: DISCONTINUED | OUTPATIENT
Start: 2022-08-22 | End: 2022-08-22 | Stop reason: HOSPADM

## 2022-08-22 RX ORDER — FENTANYL CITRATE 50 UG/ML
12.5-2 INJECTION, SOLUTION INTRAMUSCULAR; INTRAVENOUS
Status: DISCONTINUED | OUTPATIENT
Start: 2022-08-22 | End: 2022-08-22 | Stop reason: HOSPADM

## 2022-08-22 RX ORDER — SODIUM CHLORIDE 9 MG/ML
75 INJECTION, SOLUTION INTRAVENOUS CONTINUOUS
Status: DISCONTINUED | OUTPATIENT
Start: 2022-08-22 | End: 2022-08-22 | Stop reason: HOSPADM

## 2022-08-22 RX ORDER — SODIUM CHLORIDE 9 MG/ML
INJECTION, SOLUTION INTRAVENOUS
Status: DISCONTINUED | OUTPATIENT
Start: 2022-08-22 | End: 2022-08-22 | Stop reason: HOSPADM

## 2022-08-22 RX ORDER — EPINEPHRINE 0.1 MG/ML
1 INJECTION INTRACARDIAC; INTRAVENOUS
Status: DISCONTINUED | OUTPATIENT
Start: 2022-08-22 | End: 2022-08-22 | Stop reason: HOSPADM

## 2022-08-22 RX ORDER — DEXTROMETHORPHAN/PSEUDOEPHED 2.5-7.5/.8
1.2 DROPS ORAL
Status: DISCONTINUED | OUTPATIENT
Start: 2022-08-22 | End: 2022-08-22 | Stop reason: HOSPADM

## 2022-08-22 RX ORDER — LIDOCAINE HYDROCHLORIDE 20 MG/ML
INJECTION, SOLUTION EPIDURAL; INFILTRATION; INTRACAUDAL; PERINEURAL AS NEEDED
Status: DISCONTINUED | OUTPATIENT
Start: 2022-08-22 | End: 2022-08-22 | Stop reason: HOSPADM

## 2022-08-22 RX ORDER — MIDAZOLAM HYDROCHLORIDE 1 MG/ML
.25-5 INJECTION, SOLUTION INTRAMUSCULAR; INTRAVENOUS
Status: DISCONTINUED | OUTPATIENT
Start: 2022-08-22 | End: 2022-08-22 | Stop reason: HOSPADM

## 2022-08-22 RX ORDER — ATROPINE SULFATE 0.1 MG/ML
0.5 INJECTION INTRAVENOUS
Status: DISCONTINUED | OUTPATIENT
Start: 2022-08-22 | End: 2022-08-22 | Stop reason: HOSPADM

## 2022-08-22 RX ORDER — PROPOFOL 10 MG/ML
INJECTION, EMULSION INTRAVENOUS AS NEEDED
Status: DISCONTINUED | OUTPATIENT
Start: 2022-08-22 | End: 2022-08-22 | Stop reason: HOSPADM

## 2022-08-22 RX ADMIN — PROPOFOL 50 MG: 10 INJECTION, EMULSION INTRAVENOUS at 15:41

## 2022-08-22 RX ADMIN — PROPOFOL 50 MG: 10 INJECTION, EMULSION INTRAVENOUS at 15:37

## 2022-08-22 RX ADMIN — PROPOFOL 25 MG: 10 INJECTION, EMULSION INTRAVENOUS at 16:01

## 2022-08-22 RX ADMIN — LIDOCAINE HYDROCHLORIDE 40 MG: 20 INJECTION, SOLUTION EPIDURAL; INFILTRATION; INTRACAUDAL; PERINEURAL at 15:33

## 2022-08-22 RX ADMIN — PROPOFOL 50 MG: 10 INJECTION, EMULSION INTRAVENOUS at 15:59

## 2022-08-22 RX ADMIN — PROPOFOL 50 MG: 10 INJECTION, EMULSION INTRAVENOUS at 15:49

## 2022-08-22 RX ADMIN — PROPOFOL 50 MG: 10 INJECTION, EMULSION INTRAVENOUS at 15:36

## 2022-08-22 RX ADMIN — SODIUM CHLORIDE: 900 INJECTION, SOLUTION INTRAVENOUS at 15:00

## 2022-08-22 RX ADMIN — PROPOFOL 50 MG: 10 INJECTION, EMULSION INTRAVENOUS at 15:55

## 2022-08-22 RX ADMIN — PROPOFOL 50 MG: 10 INJECTION, EMULSION INTRAVENOUS at 15:40

## 2022-08-22 RX ADMIN — PROPOFOL 50 MG: 10 INJECTION, EMULSION INTRAVENOUS at 15:47

## 2022-08-22 RX ADMIN — PROPOFOL 50 MG: 10 INJECTION, EMULSION INTRAVENOUS at 15:39

## 2022-08-22 RX ADMIN — PROPOFOL 50 MG: 10 INJECTION, EMULSION INTRAVENOUS at 15:45

## 2022-08-22 RX ADMIN — PROPOFOL 50 MG: 10 INJECTION, EMULSION INTRAVENOUS at 15:51

## 2022-08-22 RX ADMIN — PROPOFOL 50 MG: 10 INJECTION, EMULSION INTRAVENOUS at 15:43

## 2022-08-22 RX ADMIN — PROPOFOL 50 MG: 10 INJECTION, EMULSION INTRAVENOUS at 15:53

## 2022-08-22 RX ADMIN — SODIUM CHLORIDE: 900 INJECTION, SOLUTION INTRAVENOUS at 15:55

## 2022-08-22 RX ADMIN — PROPOFOL 50 MG: 10 INJECTION, EMULSION INTRAVENOUS at 15:34

## 2022-08-22 RX ADMIN — PROPOFOL 25 MG: 10 INJECTION, EMULSION INTRAVENOUS at 16:05

## 2022-08-22 RX ADMIN — PROPOFOL 25 MG: 10 INJECTION, EMULSION INTRAVENOUS at 16:03

## 2022-08-22 RX ADMIN — PROPOFOL 50 MG: 10 INJECTION, EMULSION INTRAVENOUS at 15:38

## 2022-08-22 RX ADMIN — PROPOFOL 50 MG: 10 INJECTION, EMULSION INTRAVENOUS at 15:57

## 2022-08-22 RX ADMIN — PROPOFOL 25 MG: 10 INJECTION, EMULSION INTRAVENOUS at 16:07

## 2022-08-22 NOTE — DISCHARGE INSTRUCTIONS
[address], [address] [place] [address] Roxanne Osborne  [id number]  1980    COLON DISCHARGE INSTRUCTIONS    DISCOMFORT:  Redness at IV site- apply warm compress to area; if redness or soreness persist- contact your physician  There may be a slight amount of blood passed from the rectum  Gaseous discomfort- walking, belching will help relieve any discomfort    DIET:   Soft diet. ACTIVITY:  You may resume your normal daily activities it is recommended that you spend the remainder of the day resting -  avoid any strenuous activity. You may not operate a vehicle for 12 hours  You may not engage in an occupation involving machinery or appliances for rest of today  You may not drink alcoholic beverages for at least 12 hours  Avoid making any critical decisions for at least 24 hour    CALL M.D. ANY SIGN OF:   Increasing pain, nausea, vomiting  Abdominal distension (swelling)  New increased bleeding (oral or rectal)  Fever (chills)  Pain in chest area  Bloody discharge from nose or mouth  Shortness of breath     Follow-up Instructions:   Call Dr. Dipika Dhaliwal for any questions or problems. Telephone # [phone]  Biopsy results will be available in  5 to 7 days    Impression:  -Large (about 5 cm) partially obstructing mass found in the rectum at about 10 cm from the anal verge. Ultraslim scope required to traverse the lumen with resistance. Biopsies were obtained and site was marked with tattoo.   -Small 4 mm polyp found in the sigmoid colon, removed and sent for pathology  -Large anal skin tags. No significant hemorrhoids. Recommendations:   -Resume normal medication(s). -no Non-Steroidal Anti Inflammatorys (NSAIDS)   -Await pathology.  -Keep appointment with oncology for further management.    -Repeat colonoscopy in 1 year     -Soft diet (avoid solid meats, breads, raw fruits/vegetables, nuts/seeds, corn)      Dipika Dhaliwal MD    Learning About Coronavirus (COVID-19)  Coronavirus (COVID-19): Overview  What is coronavirus ([id number])? The coronavirus disease (COVID-19) is caused by a virus. It is an illness that was first found [facility], in December 2019. It has since spread worldwide. The virus can cause fever, cough, and trouble breathing. In severe cases, it can cause pneumonia and make it hard to breathe without help. It can cause death. Coronaviruses are a large group of viruses. They cause the common cold. They also cause more serious illnesses like Middle East respiratory syndrome (MERS) and severe acute respiratory syndrome (SARS). COVID-19 is caused by a novel coronavirus. That means it's a new type that has not been seen in people before. This virus spreads person-to-person through droplets from coughing and sneezing. It can also spread when you are close to someone who is infected. And it can spread when you touch something that has the virus on it, such as a doorknob or a tabletop. What can you do to protect yourself from coronavirus (COVID-19)? The best way to protect yourself from getting sick is to: Avoid areas where there is an outbreak. Avoid contact with people who may be infected. Wash your hands often with soap or alcohol-based hand sanitizers. Avoid crowds and try to stay at least 6 feet away from other people. Wash your hands often, especially after you cough or sneeze. Use soap and water, and scrub for at least 20 seconds. If soap and water aren't available, use an alcohol-based hand . Avoid touching your mouth, nose, and eyes. What can you do to avoid spreading the virus to others? To help avoid spreading the virus to others:  Cover your mouth with a tissue when you cough or sneeze. Then throw the tissue in the trash. Use a disinfectant to clean things that you touch often. Stay home if you are sick or have been exposed to the virus. Don't go to school, work, or public areas.  And don't use public transportation. If you are sick:  Leave your home only if you need to get medical care. But call the doctor's office first so they know you're coming. And wear a face mask, if you have one. If you have a face mask, wear it whenever you're around other people. It can help stop the spread of the virus when you cough or sneeze. Clean and disinfect your home every day. Use household  and disinfectant wipes or sprays. Take special care to clean things that you grab with your hands. These include doorknobs, remote controls, phones, and handles on your refrigerator and microwave. And don't forget countertops, tabletops, bathrooms, and computer keyboards. When to call for help  Call 911 anytime you think you may need emergency care. For example, call if:  You have severe trouble breathing. (You can't talk at all.)  You have constant chest pain or pressure. You are severely dizzy or lightheaded. You are confused or can't think clearly. Your face and lips have a blue color. You pass out (lose consciousness) or are very hard to wake up. Call your doctor now if you develop symptoms such as:  Shortness of breath. Fever. Cough. If you need to get care, call ahead to the doctor's office for instructions before you go. Make sure you wear a face mask, if you have one, to prevent exposing other people to the virus. Where can you get the latest information? The following health organizations are tracking and studying this virus. Their websites contain the most up-to-date information. Rodo Putnam also learn what to do if you think you may have been exposed to the virus. U.S. Centers for Disease Control and Prevention (CDC): The CDC provides updated news about the disease and travel advice. The website also tells you how to prevent the spread of infection. www.cdc.gov  World Health Organization [facility]): WHO offers information about the virus outbreaks.  WHO also has travel advice. www.who.int  Current as of: April 1, 2020 Content Version: 12.4  © 2473-3500 [employer], [employer]orporated. Care instructions adapted under license by your healthcare professional. If you have questions about a medical condition or this instruction, always ask your healthcare professional. [address] any warranty or liability for your use of this information.

## 2022-08-22 NOTE — PROGRESS NOTES

## 2022-08-22 NOTE — PROCEDURES
[address], [facility]  [phone]               Colonoscopy Operative Report      Indications:  Recently diagnosed pelvic cancer found to have colon primary with evidence of proximal rectal mass. No prior colonoscopy. :  Ashlyn Boss MD    Staff: Endoscopy Irma Zendejas: Jac Larson  Endoscopy RN-1: Wallis Litten, RN     Referring Provider: Annika Alan NP    Sedation:  MAC anesthesia    Procedure Details:  After informed consent was obtained with all risks and benefits of procedure explained and preoperative exam completed, the patient was taken to the endoscopy suite and placed in the left lateral decubitus position. Upon sequential sedation as per above, a digital rectal exam was performed  And was normal.  The Olympus videocolonoscope  was inserted in the rectum and carefully advanced to the cecum, which was identified by the ileocecal valve and appendiceal orifice. The quality of preparation was good. The colonoscope was slowly withdrawn with careful evaluation between folds. Retroflexion in the rectum was performed and was normal..     Findings:   Rectum: Large partially obstructing friable and firm mass seen in the proximal rectum at about 10 cm from the anal verge, extending about 5 cm proximally. Unable to advance adult colonoscope across. The ultraslim scope was then used and this was passed with significant difficulty indicating lumen of around 6-7 mm. Biopsies were obtained and site was marked with tattoo on both proximal and distal aspect of mass. Sigmoid: 1  Sessile polyp(s), the largest 4 mm in size; Descending Colon: normal  Transverse Colon: normal  Ascending Colon: normal  Cecum: normal  Terminal Ileum: not intubated    Interventions:  biopsy of rectum mass  Tattoo ink injected proximal and distal to the rectal mass to mani the site.    1 complete polypectomy were performed using cold snare  and the polyps were retrieved    Specimen Removed:   ID Type Source Tests Collected by Time Destination   1 : Sigmoid polyp Preservative Sigmoid  Sona Bear MD 8/22/2022 1554 Pathology   2 : rectum mass Preservative Rectum  Sona Bear MD 8/22/2022 1558 Pathology       EBL:  Minimal    Complications:  None; patient tolerated the procedure well. Impression:  -Large (about 5 cm) partially obstructing mass found in the rectum at about 10 cm from the anal verge. Ultraslim scope required to traverse the lumen with resistance. Biopsies were obtained and site was marked with tattoo.   -Small 4 mm polyp found in the sigmoid colon, removed and sent for pathology  -Large anal skin tags. No significant hemorrhoids. Recommendations:   -Resume normal medication(s). -no Non-Steroidal Anti Inflammatorys (NSAIDS)   -Await pathology.  -Keep appointment with oncology for further management. -Repeat colonoscopy in 1 year     -Soft diet (avoid solid meats, breads, raw fruits/vegetables, nuts/seeds, corn)      Discharge Disposition:  Home in the company of a  when able to ambulate.     Yoel Melendrez MD  8/22/2022  4:15 PM

## 2022-08-22 NOTE — H&P
[address]  [phone]        History and Physical     NAME: Lazarus Hancock   :  1980   MRN:  [id number]         HPI:  Lazarus Hancock is a 35 y.o. female here for colonoscopy. Recent pelvic surgery for mass with hysterectomy - path confirmed colorectal cancer and noted to have firm mass in proximal rectum during surgery. No prior colonoscopy. Reports mild tenderness over mid line surgical site. Past Surgical History:   Procedure Laterality Date    HX  SECTION  2012    HX  SECTION  2015    HX CHOLECYSTECTOMY  2013    HX HEENT      eye surgery    HX HYSTERECTOMY  2022    HX LIPOMA RESECTION  2010    HX ORTHOPAEDIC      Carpul jesus manuel    HX OTHER SURGICAL      lipoma removal     Past Medical History:   Diagnosis Date    Asthma     Diabetes ([address]     Gastrointestinal disorder     GERD    GERD (gastroesophageal reflux disease)     Thyroid disease      Social History     Tobacco Use    Smoking status: Never    Smokeless tobacco: Never   Vaping Use    Vaping Use: Never used   Substance Use Topics    Alcohol use: No     Alcohol/week: 0.0 standard drinks    Drug use: No     No current facility-administered medications on file prior to encounter. Current Outpatient Medications on File Prior to Encounter   Medication Sig Dispense Refill    ibuprofen (MOTRIN) 800 mg tablet Take 1 Tablet by mouth every eight (8) hours as needed for Pain. 40 Tablet 0    polyethylene glycol (MIRALAX) 17 gram packet Take 1 Packet by mouth two (2) times a day for 30 days. 60 Packet 0    methIMAzole (TAPAZOLE) 10 mg tablet Take 10 mg by mouth in the morning. FIASP FLEXTOUCH U-100 INSULIN 100 unit/mL (3 mL) inpn       Magnesium Oxide 500 mg cap Take  by mouth.      pantoprazole (PROTONIX) 40 mg tablet take 1 tablet by mouth once daily 90 Tab 4    TOUJEO SOLOSTAR 300 unit/mL (1.5 mL) inpn 46 Units by SubCUTAneous route daily. PNV #41/[id number] FUM/FOLIC ACID (PRENATAL MULTIVIT WITH IRON PO) Take  by mouth. albuterol (PROVENTIL HFA, VENTOLIN HFA, PROAIR HFA) 90 mcg/actuation inhaler Take 2 Puffs by inhalation every four (4) hours as needed for Wheezing.  1 Inhaler 1    NOVOFINE 30 30 gauge x 1/3\"   2    ONETOUCH VERIO strip   4     Allergies   Allergen Reactions    Aspirin Itching    Eliquis [Apixaban] Itching and Swelling    Orange Swelling    Sulfa (Sulfonamide Antibiotics) Nausea and Vomiting    Sudafed [Pseudoephedrine Hcl] Other (comments)     Severe head ache     Family History   Problem Relation Age of Onset    Thyroid Disease Mother     Heart Disease Father     Cancer Father 61        Colon cancer    Alcohol abuse Father     No Known Problems Sister     Cancer Paternal Aunt         Breast cancer    Arthritis-rheumatoid Paternal Aunt     Breast Cancer Paternal Aunt     Cancer Paternal Aunt         breast cancer    Breast Cancer Paternal Aunt     Breast Cancer Paternal Grandmother     Breast Cancer Other         great paternal aunt    Cancer Other         paternal aunt, ovarian cancer    Anesth Problems Neg Hx      Current Facility-Administered Medications   Medication Dose Route Frequency    0.9% sodium chloride infusion  75 mL/hr IntraVENous CONTINUOUS    sodium chloride (NS) flush 5-40 mL  5-40 mL IntraVENous Q8H    sodium chloride (NS) flush 5-40 mL  5-40 mL IntraVENous PRN    midazolam (VERSED) injection 0.25-5 mg  0.25-5 mg IntraVENous Multiple    fentaNYL citrate (PF) injection 12.5-200 mcg  12.5-200 mcg IntraVENous Multiple    naloxone (NARCAN) injection 0.4 mg  0.4 mg IntraVENous Multiple    flumazeniL (ROMAZICON) 0.1 mg/mL injection 0.2 mg  0.2 mg IntraVENous Multiple    simethicone (MYLICON) 62WF/1.4TF oral drops 80 mg  1.2 mL Oral Multiple    atropine injection 0.5 mg  0.5 mg IntraVENous ONCE PRN    EPINEPHrine (ADRENALIN) 0.1 mg/mL syringe 1 mg  1 mg Endoscopically ONCE PRN       PHYSICAL EXAM:    /78 Pulse 94   Temp 99.1 °F (37.3 °C)   Resp 14   Ht 5' 2\" (1.575 m)   Wt 88.5 kg (195 lb)   LMP 07/14/2022 (Approximate)   SpO2 99%   Breastfeeding No   BMI 35.67 kg/m²      General: WD, WN. Alert, cooperative, no acute distress    HEENT: NC, Atraumatic. PERRLA, EOMI. Anicteric sclerae. Lungs:  CTA Bilaterally. No Wheezing/Rhonchi/Rales. Heart:  Regular  rhythm,  No murmur, No Rubs, No Gallops  Abdomen: Soft, Non distended, Non tender. +Bowel sounds, no HSM  Extremities: No c/c/e  Neurologic:  CN 2-12 gi, Alert and oriented X 3. No acute neurological distress   Psych:   Good insight. Not anxious nor agitated. Assessment:   Recent diagnosis of metastatic rectal cancer.  No prior colonoscopy    Plan:   Endoscopic procedure: Colonoscopy  Anesthesia plan: Monitored Anesthesia Care

## 2022-08-22 NOTE — ANESTHESIA PREPROCEDURE EVALUATION
Relevant Problems   No relevant active problems       Anesthetic History   No history of anesthetic complications            Review of Systems / Medical History  Patient summary reviewed, nursing notes reviewed and pertinent labs reviewed    Pulmonary            Asthma        Neuro/Psych   Within defined limits           Cardiovascular                  Exercise tolerance: >4 METS     GI/Hepatic/Renal     GERD           Endo/Other    Diabetes  Hypothyroidism       Other Findings              Physical Exam    Airway  Mallampati: II           Cardiovascular               Dental         Pulmonary                 Abdominal         Other Findings            Anesthetic Plan    ASA: 2  Anesthesia type: MAC          Induction: Intravenous  Anesthetic plan and risks discussed with: Patient

## 2022-08-22 NOTE — ANESTHESIA POSTPROCEDURE EVALUATION
Procedure(s):  COLONOSCOPY  COLON BIOPSY  INJECTION  ENDOSCOPIC POLYPECTOMY. MAC    Anesthesia Post Evaluation      Multimodal analgesia: multimodal analgesia not used between 6 hours prior to anesthesia start to PACU discharge  Patient location during evaluation: PACU  Patient participation: complete - patient participated  Level of consciousness: awake  Pain score: 0  Pain management: adequate  Airway patency: patent  Anesthetic complications: no  Cardiovascular status: acceptable  Respiratory status: acceptable  Hydration status: acceptable  Comments: I have evaluated the patient and meets criteria for discharge from PACU. Henna Pitt MD    Final Post Anesthesia Temperature Assessment:  Normothermia (36.0-37.5 degrees C)      INITIAL Post-op Vital signs:   Vitals Value Taken Time   /81 08/22/22 1620   Temp 36.6 °C (97.8 °F) 08/22/22 1617   Pulse 98 08/22/22 1620   Resp 13 08/22/22 1620   SpO2 100 % 08/22/22 1620   Vitals shown include unvalidated device data.

## 2022-08-24 ENCOUNTER — DOCUMENTATION ONLY (OUTPATIENT)
Dept: ONCOLOGY | Age: 42
End: 2022-08-24

## 2022-08-24 ENCOUNTER — OFFICE VISIT (OUTPATIENT)
Dept: ONCOLOGY | Age: 42
End: 2022-08-24
Payer: MEDICAID

## 2022-08-24 ENCOUNTER — TELEPHONE (OUTPATIENT)
Dept: ONCOLOGY | Age: 42
End: 2022-08-24

## 2022-08-24 VITALS
HEIGHT: 62 IN | HEART RATE: 98 BPM | SYSTOLIC BLOOD PRESSURE: 118 MMHG | BODY MASS INDEX: 36.31 KG/M2 | OXYGEN SATURATION: 99 % | TEMPERATURE: 97.6 F | WEIGHT: 197.3 LBS | DIASTOLIC BLOOD PRESSURE: 75 MMHG

## 2022-08-24 DIAGNOSIS — Z80.0 FAMILY HISTORY OF RECTAL CANCER: ICD-10-CM

## 2022-08-24 DIAGNOSIS — Z98.890 STATUS POST SURGERY: ICD-10-CM

## 2022-08-24 DIAGNOSIS — C20 RECTAL CANCER METASTASIZED TO PELVIS (HCC): ICD-10-CM

## 2022-08-24 DIAGNOSIS — C79.89 RECTAL CANCER METASTASIZED TO PELVIS (HCC): ICD-10-CM

## 2022-08-24 DIAGNOSIS — C20 RECTAL CANCER (HCC): Primary | ICD-10-CM

## 2022-08-24 PROCEDURE — 99215 OFFICE O/P EST HI 40 MIN: CPT | Performed by: INTERNAL MEDICINE

## 2022-08-24 NOTE — TELEPHONE ENCOUNTER
Contacted patient to see if she would like to move her visit from 08/25/22 at 1:00 to 08/24/22 at 1:45 per doctor request.  Pt was agreeable. Appt time and date moved. Pt to CT with imaging staff.

## 2022-08-24 NOTE — PROGRESS NOTES
Miko Carvalho is a 35 y.o. female. Chief Complaint   Patient presents with    New Patient     possible CR cancer. 1. Have you been to the ER, urgent care clinic since your last visit? Hospitalized since your last visit? Yes, patient was in the ER for having abdominal pain    2. Have you seen or consulted any other health care providers outside of the [address] [place] [address] since your last visit? Include any pap smears or colon screening.  No

## 2022-08-24 NOTE — LETTER
8/24/2022    Patient: Anabel Tee   YOB: 1980   Date of Visit: 8/24/2022     Peace Sanchez NP  [address] [facility] [id number]  Via Fax: [phone]    Dear Peace Sanchez NP,      Thank you for referring Ms. Dalton Acevedo to [address] [place] [address] for evaluation. My notes for this consultation are attached. If you have questions, please do not hesitate to call me. I look forward to following your patient along with you.       Sincerely,    Sula Heimlich, DO

## 2022-08-24 NOTE — PROGRESS NOTES
Please send Caris testing / met rectal cancer  And gene testing  Need MSI/ MARIELY/ BRAF/ Her2 from our path here  Not sure why not done as supposed to be doing reflexively

## 2022-08-24 NOTE — PROGRESS NOTES
[facility] at [address]  [address]  W: [phone]  F: [phone]    Reason for Visit:   Tiffany Osborne is a 35 y.o. female who is seen in office fu for rectal cancer    Treatment History:   22  Procedure(s): EXPLORATORY LAPAROTOMY, RESECTION OF MASS, TOTAL ABDOMINAL HYSTEROSCOPY, BILATERAL SALPINGOOOPHORECTOMY, OMENTECTOMY, APPENDECTOMY, PROCTOSCOPY    STAGE: 4 with pelvic disease    History of Present Illness:     Pt seen today in office for metastatic rectal cancer. Seen in hospital post GYN/ONC surgery. Had GI eval and has rectal mass. Here for discussion. No molecular testing done yet on path. No fevers/ chills/ chest pain/ SOB/ nausea/ vomiting/diarrhea/ pain/fatigue        Last visit: hospital as a new patient for possible CR cancer found during surgery for a pelvic mass. CT 22: There is a large pelvic cystic mass measuring  20.2 x 12.7 x 17.4 cm with multiple septations  A large pelvic cystic mass with multiple septations is likely adnexal in origin  with differential considerations including benign and malignant etiologies. Mild  right hydronephrosis    Pt had surgery today. Seen post op in room. Awake and doing ok.  at bedside. Path pending. Pt was seen by general surgery in OR. No pain at my visit.    No fevers/ chills/ chest pain/ SOB/ nausea/ vomiting/diarrhea/ pain/fatigue  CEA 70      Past Medical History:   Diagnosis Date    Asthma     Diabetes ([address]     Gastrointestinal disorder     GERD    GERD (gastroesophageal reflux disease)     Thyroid disease       Past Surgical History:   Procedure Laterality Date    COLONOSCOPY N/A 2022    [address] Emory Martin MD at [address]  2012    HX  SECTION  2015    HX CHOLECYSTECTOMY  2013    HX HEENT      eye surgery    HX HYSTERECTOMY  2022    HX LIPOMA RESECTION  2010    HX ORTHOPAEDIC Carpul jesus manuel    HX OTHER SURGICAL      lipoma removal      Social History     Tobacco Use    Smoking status: Never    Smokeless tobacco: Never   Substance Use Topics    Alcohol use: No     Alcohol/week: 0.0 standard drinks      Family History   Problem Relation Age of Onset    Thyroid Disease Mother     Heart Disease Father     Cancer Father 61        Colon cancer    Alcohol abuse Father     No Known Problems Sister     Cancer Paternal Aunt         Breast cancer    Arthritis-rheumatoid Paternal Aunt     Breast Cancer Paternal Aunt     Cancer Paternal Aunt         breast cancer    Breast Cancer Paternal Aunt     Breast Cancer Paternal Grandmother     Breast Cancer Other         great paternal aunt    Cancer Other         paternal aunt, ovarian cancer    Anesth Problems Neg Hx      Current Outpatient Medications   Medication Sig    polyethylene glycol (MIRALAX) 17 gram packet Take 1 Packet by mouth two (2) times a day for 30 days. methIMAzole (TAPAZOLE) 10 mg tablet Take 10 mg by mouth in the morning. FIASP FLEXTOUCH U-100 INSULIN 100 unit/mL (3 mL) inpn     Magnesium Oxide 500 mg cap Take  by mouth.    pantoprazole (PROTONIX) 40 mg tablet take 1 tablet by mouth once daily    albuterol (PROVENTIL HFA, VENTOLIN HFA, PROAIR HFA) 90 mcg/actuation inhaler Take 2 Puffs by inhalation every four (4) hours as needed for Wheezing. TOUJEO SOLOSTAR 300 unit/mL (1.5 mL) inpn 46 Units by SubCUTAneous route daily. NOVOFINE 30 30 gauge x 1/3\"     ONETOUCH VERIO strip     PNV #20/[id number] FUM/FOLIC ACID (PRENATAL MULTIVIT WITH IRON PO) Take  by mouth. No current facility-administered medications for this visit.       Allergies   Allergen Reactions    Aspirin Itching    Eliquis [Apixaban] Itching and Swelling    Orange Swelling    Sulfa (Sulfonamide Antibiotics) Nausea and Vomiting    Sudafed [Pseudoephedrine Hcl] Other (comments)     Severe head ache        Review of Systems: A complete review of systems was obtained, negative except as described above and as reported on ROS sheet scanned into system. Physical Exam:   Visit Vitals  /75 (BP 1 Location: Left upper arm, BP Patient Position: Sitting)   Pulse 98   Temp 97.6 °F (36.4 °C) (Temporal)   Ht 5' 2\" (1.575 m)   Wt 197 lb 4.8 oz (89.5 kg)   LMP 07/14/2022 (Approximate)   SpO2 99%   BMI 36.09 kg/m²     ECOG PS:  1  General: No distress  Eyes: PERRLA, anicteric sclerae  HENT: Atraumatic  Neck: Supple  Respiratory: CTAB, normal respiratory effort  CV: Normal rate, regular rhythm  GI: Soft, nontender,vertical scar healing well with dressing  MS: ambulatory  Skin: No rashes, ecchymoses, or petechiae. Normal temperature, turgor, and texture. Psych: Alert, conversant appropriately    Results:     Lab Results   Component Value Date/Time    WBC 5.4 08/12/2022 04:30 PM    HGB 10.2 (L) 08/12/2022 04:30 PM    HCT 32.1 (L) 08/12/2022 04:30 PM    PLATELET 440 96/50/8836 04:30 PM    MCV 87.2 08/12/2022 04:30 PM    ABS. NEUTROPHILS 3.0 08/12/2022 04:30 PM     Lab Results   Component Value Date/Time    Sodium 139 08/12/2022 04:30 PM    Potassium 3.6 08/12/2022 04:30 PM    Chloride 104 08/12/2022 04:30 PM    CO2 29 08/12/2022 04:30 PM    Glucose 120 (H) 08/12/2022 04:30 PM    BUN 8 08/12/2022 04:30 PM    Creatinine 0.54 (L) 08/12/2022 04:30 PM    GFR est AA >60 08/12/2022 04:30 PM    GFR est non-AA >60 08/12/2022 04:30 PM    Calcium 9.5 08/12/2022 04:30 PM    Glucose (POC) 221 (H) 08/03/2022 12:11 PM     Lab Results   Component Value Date/Time    Bilirubin, total 0.2 08/12/2022 04:30 PM    ALT (SGPT) 22 08/12/2022 04:30 PM    Alk.  phosphatase 97 08/12/2022 04:30 PM    Protein, total 7.0 08/12/2022 04:30 PM    Albumin 3.5 08/12/2022 04:30 PM    Globulin 3.5 08/12/2022 04:30 PM       CT Results (most recent):  Results from Hospital Encounter encounter on 07/27/22    CT ABD PELV W CONT    Narrative  EXAM:  CT abdomen pelvis with contrast    INDICATION: Abdominal pain    COMPARISON: None. TECHNIQUE: Helical CT of the abdomen  and pelvis  following the uneventful  intravenous administration of nonionic contrast.  Coronal and sagittal reformats  are performed. CT dose reduction was achieved through use of a standardized  protocol tailored for this examination and automatic exposure control for dose  modulation. Adaptive statistical iterative reconstruction (ASIR) was utilized. FINDINGS:  The visualized lung bases demonstrate no mass or consolidation. The heart size  is normal. There is no pericardial or pleural effusion. The liver, spleen, pancreas, and adrenal glands are normal. The gall bladder is  surgically absent without intra- or extra-hepatic biliary dilatation. The kidneys enhance symmetrically. There is mild left hydroureteronephrosis  likely attributable to mass effect from the pelvic cystic mass. There is no left  hydronephrosis. There are no dilated bowel loops. The appendix is normal.    There are no enlarged lymph nodes. There is no free fluid or free air. The urinary bladder is normal. There is a large pelvic cystic mass measuring  20.2 x 12.7 x 17.4 cm with multiple septations. The bony structures are age-appropriate. Impression  A large pelvic cystic mass with multiple septations is likely adnexal in origin  with differential considerations including benign and malignant etiologies. Mild  right hydronephrosis. Records reviewed and summarized above. Pathology report(s) reviewed above. Radiology report(s) reviewed above. Assessment/PLAN:     1) stage 4 rectal cancer with mets to pelvis  Found at GYN/ONC surgery. 8/1/22  Procedure(s): EXPLORATORY LAPAROTOMY, RESECTION OF MASS, TOTAL ABDOMINAL HYSTEROSCOPY, BILATERAL SALPINGOOOPHORECTOMY, OMENTECTOMY, APPENDECTOMY, PROCTOSCOPY    Seen today for first office visit. Recovering well post surgery. 3 weeks out. Had GI eval and has rectal mass and biopsy + for adenocarcinoma. Waiting for molecular markers  Staging with CT chest. Ordered. Labs stable. Chemo/ IO/ systemic therapy pending markers. Will need a port via GYN/ONC or surgery. Clinically doing great today. Follow up in 2 weeks    2) abdominal pain. Controlled. Monitor. 3) reported colon cancer in Father. Aunts breast cancer  Wants to do genetic testing. Consider genetic counseling     4) asthma/ DM/GERD/ thyroid. Per IM. 5) DM Type 1. Per endocrine. 6) psychosocial. Mood good. Coping well.  here today. Has two young children. Pt has good support. Fu here in 2 weeks  Call if questions    I appreciate the opportunity to participate in Ms. Faustina Bumpers Lockney's care.     Signed By: Nhi Schwartz, DO

## 2022-08-25 ENCOUNTER — OFFICE VISIT (OUTPATIENT)
Dept: GYNECOLOGY | Age: 42
End: 2022-08-25
Payer: MEDICAID

## 2022-08-25 VITALS
SYSTOLIC BLOOD PRESSURE: 147 MMHG | HEART RATE: 90 BPM | DIASTOLIC BLOOD PRESSURE: 81 MMHG | BODY MASS INDEX: 36.25 KG/M2 | HEIGHT: 62 IN | WEIGHT: 197 LBS

## 2022-08-25 DIAGNOSIS — C79.62 SECONDARY ADENOCARCINOMA OF LEFT OVARY (HCC): ICD-10-CM

## 2022-08-25 DIAGNOSIS — C20 RECTAL CANCER (HCC): Primary | ICD-10-CM

## 2022-08-25 PROCEDURE — 99024 POSTOP FOLLOW-UP VISIT: CPT | Performed by: OBSTETRICS & GYNECOLOGY

## 2022-08-25 NOTE — PROGRESS NOTES
[facility] [address], [address]  P [phone]  F [phone]    Postoperative Office Note  Patient ID:  Name: Yassine Toledo  MRM: [id number]  : 1980/41 y.o. Date: 2022    Diagnosis:     ICD-10-CM ICD-9-CM    1. Rectal cancer (HCC)  C20 154.1       2. 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 9) E66.9    Obesity, morbid ([address] E66.01    Pelvic mass R19.00       SUBJECTIVE:    Yassine Toledo is a 35 y.o. female who presents for followup postoperative care following exploratory laparotomy, resection of mass, total abdominal hysterectomy, bilateral salpingooophorectomy, omentectomy, appendectomy, proctoscopy. Operative findings:  Large, complex, solid/cystic mass arising from the left ovary. The mass was adherent within the cul de sac and extended to the umbilicus. Normal-appearing uterus. Normal-appearing right tube and ovary. 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 On palpation of colon she was noted to have a very firm lesion of the upper rectum, consistent with a colorectal primary. The colon did not appear obstructed. No other appreciable peritoneal disease. The remainder of the colon and small bowel appeared normal.  Atrophic vestigal appendix. Diaphragmatic surfaces smooth. No appreciable liver lesions. Unable to visualize the lesion by proctoscopy  due to large amount of stool. General surgery consulted intraoperatively. They recommended not doing the colon resection at this time, since she was not adequately prepped and would need a colostomy. She might also benefit from some neoadjuvant therapy.      The patient's pathology revealed: FINAL PATHOLOGIC DIAGNOSIS   1. Ovary and fallopian tube, left, salpingectomy and oophorectomy:   Metastatic colorectal adenocarcinoma, see comment. Tumor involves ovary and fallopian tube. 2. Infundibulopelvic ligament, left, resection:   Benign fibrovascular and adipose tissue. No malignancy identified. 3. Peritoneum, cul-de-sac, resection:   Metastatic colorectal adenocarcinoma. Peritoneal inclusion cyst.   4. Uterus with cervix, right ovary, right fallopian tube, hysterectomy, right salpingectomy and oophorectomy:   Metastatic colorectal adenocarcinoma on serosal surface. Cervix with no histological abnormalities. Benign secretory endometrium, negative for hyperplasia and malignancy. Myometrium with benign leiomyoma. Right ovary with benign cystic follicles. Benign fallopian tube with fimbria. 5. Omentum, omentectomy:   Benign omental tissue with reactive mesothelial cells. No malignancy identified. 6. Appendix, appendectomy:   Fibrous obliteration of lumen. No malignancy identified. Comment   Immunohistochemical stains (pancytokeratin, CK 7, CK 20, CDX2, PAX8, p16, p53) were performed on block 1E with appropriately reactive controls. Immunohistochemical stain (SATB2) was performed on block 1J at [place] with appropriately reactive controls. The tumor is positive for pancytokeratin, CK 20, CDX2, p53, SATB2, and negative for the remaining stains. This is consistent with a metastatic colorectal adenocarcinoma. Clinical correlation is recommended. CYTOLOGIC INTERPRETATION   Pelvic Washing, ThinPrep   Atypical cell groups (see comment)   General Categorization   Atypical, favor neoplastic process. Specimen Adequacy   Satisfactory for evaluation. Comment   Atypical cell groups are worrisome for involvement by neoplastic cells. Correlation with pending surgical pathology results is recommended ([id number]).        She had her staples removed last week and a portion of her incision opened up and there was some blood. I had instructed her to come in for evaluation. There was a small area of drainage. She has continued to have some drainage. She presents again for evaluation. She had her colonoscopy on 8/22 with Dr. Alka Benitez. Findings:   Rectum: Large partially obstructing friable and firm mass seen in the proximal rectum at about 10 cm from the anal verge, extending about 5 cm proximally. Unable to advance adult colonoscope across. The ultraslim scope was then used and this was passed with significant difficulty indicating lumen of around 6-7 mm. Biopsies were obtained and site was marked with tattoo on both proximal and distal aspect of mass. Sigmoid: 1  Sessile polyp(s), the largest 4 mm in size; Descending Colon: normal  Transverse Colon: normal  Ascending Colon: normal  Cecum: normal  Terminal Ileum: not intubated    Impression:  -Large (about 5 cm) partially obstructing mass found in the rectum at about 10 cm from the anal verge. Ultraslim scope required to traverse the lumen with resistance. Biopsies were obtained and site was marked with tattoo.   -Small 4 mm polyp found in the sigmoid colon, removed and sent for pathology  -Large anal skin tags. No significant hemorrhoids. FINAL PATHOLOGIC DIAGNOSIS   1. Colon, sigmoid, biopsy:   Tubular adenoma, fragments   2. Rectum, biopsy: Moderately differentiated adenocarcinoma   See comment   Comment   Immunohistochemical panel for mismatch repair proteins will be reported in an addendum     She has already had an outpatient appointment with Dr. Jesse Grewal with medical oncology. She has yet to see Dr. Chari Angelo with surgery. Medications:     Current Outpatient Medications on File Prior to Visit   Medication Sig Dispense Refill    polyethylene glycol (MIRALAX) 17 gram packet Take 1 Packet by mouth two (2) times a day for 30 days.  60 Packet 0    methIMAzole (TAPAZOLE) 10 mg tablet Take 10 mg by mouth in the morning. FIASP FLEXTOUCH U-100 INSULIN 100 unit/mL (3 mL) inpn       Magnesium Oxide 500 mg cap Take  by mouth.      pantoprazole (PROTONIX) 40 mg tablet take 1 tablet by mouth once daily 90 Tab 4    albuterol (PROVENTIL HFA, VENTOLIN HFA, PROAIR HFA) 90 mcg/actuation inhaler Take 2 Puffs by inhalation every four (4) hours as needed for Wheezing. 1 Inhaler 1    TOUJEO SOLOSTAR 300 unit/mL (1.5 mL) inpn 46 Units by SubCUTAneous route daily. NOVOFINE 30 30 gauge x 1/3\"   2    ONETOUCH VERIO strip   4    PNV #60/GOJAFMM FUM/FOLIC ACID (PRENATAL MULTIVIT WITH IRON PO) Take  by mouth. No current facility-administered medications on file prior to visit. Allergies:      Allergies   Allergen Reactions    Aspirin Itching    Eliquis [Apixaban] Itching and Swelling    Orange Swelling    Sulfa (Sulfonamide Antibiotics) Nausea and Vomiting    Sudafed [Pseudoephedrine Hcl] Other (comments)     Severe head ache     Past Medical History:   Diagnosis Date    Asthma     Diabetes ([address]     Gastrointestinal disorder     GERD    GERD (gastroesophageal reflux disease)     Thyroid disease      Past Surgical History:   Procedure Laterality Date    COLONOSCOPY N/A 2022    COLONOSCOPY performed by Joceline Scott MD at [facility] ENDOSCOPY    [address]  2012    HX  SECTION      HX CHOLECYSTECTOMY  2013    HX HEENT      eye surgery    HX HYSTERECTOMY  2022    HX LIPOMA RESECTION  2010    HX ORTHOPAEDIC      Carpul jesus manuel    HX OTHER SURGICAL      lipoma removal     Social History     Socioeconomic History    Marital status:      Spouse name: Not on file    Number of children: Not on file    Years of education: Not on file    Highest education level: Not on file   Occupational History     Employer: self employed   Tobacco Use    Smoking status: Never    Smokeless tobacco: Never   Vaping Use    Vaping Use: Never used   Substance and Sexual Activity Alcohol use: No     Alcohol/week: 0.0 standard drinks    Drug use: No    Sexual activity: Yes     Partners: Male     Birth control/protection: Condom     Comment:  has 2 children   Other Topics Concern    Not on file   Social History Narrative    Not on file     Social Determinants of Health     Financial Resource Strain: Not on file   Food Insecurity: Not on file   Transportation Needs: Not on file   Physical Activity: Not on file   Stress: Not on file   Social Connections: Not on file   Intimate Partner Violence: Not on file   Housing Stability: Not on file       OBJECTIVE:    Vitals:   Vitals:    08/25/22 1122   BP: (!) 147/81   Pulse: 90   Weight: 197 lb (89.4 kg)   Height: 5' 2.01\" (1.575 m)     Physical Examination:     General:  alert, cooperative, no distress   Lungs: lungs clear to auscultation, no accessory muscle use   Cardiac: Regular rate and rhythm   Abdomen: soft, bowel sounds active, non-tender  No CVA tenderness   Incision: Incision remains intact, but there is a small area of continued drainage at the lower aspect. I probed it with a Q-tip and opened about a 2 cm portion to a depth of 1 cm. Pelvic: Normal external genitalia. The vaginal cyst along the left vaginal wall appeared larger and obscured the view of the vaginal cuff. I opted to incise the cyst and drain it. A #15 blade scalpel was used to make a small incision. Clear yellow fluid drained. I opened the incision a little more with a Swapna clamp. She stated she felt better immediately. The vaginal cuff was visualized and appeared intact and well supported. Rectal: Deferred   Extremity:   extremities normal, atraumatic, no cyanosis or edema     IMPRESSION:    Ana María Feldman is doing well postoperatively. She has a diagnosis of metastatic colorectal carcinoma. No evidence of incisional separation or infection.     Medical problems:     Patient Active Problem List   Diagnosis Code    Pneumonia, organism unspecified(486) J18.9    Gall bladder stones K80.20    ANGULO (dyspnea on exertion) R06.00    Type 1 diabetes mellitus with complications (HCC) T43.9    Obesity (BMI 30-39. 9) E66.9    Obesity, morbid ([address] E66.01    Pelvic mass R19.00       PLAN:    The operative procedures and clinical results have been reviewed with the patient. Implications of diagnosis discussed at length. All questions answered. She has follow-up appointments scheduled with medical oncology and general surgery. I reassured her that her incision looks OK. She was instructed to call if the incision changes. The decision to be made now is whether to proceed with systemic chemotherapy now or resect the colon due to the near obstructing lesion. I will see the patient back as needed. The patient is advised to call our office with any problems or concerns. An electronic signature was used to sign this note.     Jose Fairchild MD  8/25/2022

## 2022-08-25 NOTE — PROGRESS NOTES
Add on Pathology request faxed/emailed to [facility] Path Dept for MSI/BRAF/MARIELY/HER2. Per Provider Caris/Germline testing will be discussed further at next OV.

## 2022-08-26 DIAGNOSIS — C20 RECTAL ADENOCARCINOMA (HCC): Primary | ICD-10-CM

## 2022-08-26 NOTE — TELEPHONE ENCOUNTER
Called patient to review colonoscopy findings, pathology results. Colonoscopy confirms rectal mass, 10 cm from anal verge, consistent with malignancy. Pathology confirms moderately differentiated adenocarcinoma. We discussed this represents stage IV disease given metastases to adnexa. The patient has seen oncology, with plans for cytotoxic chemotherapy versus immunotherapy (final recommendations pending, awaiting results of mismatch repair gene analysis). Patient has appointment with me for next week. I recommended rectal/pelvic MRI for staging purposes-this may alter therapy (advanced T stage may benefit from radiation to decrease risk of local recurrence). Patient has no obstructive symptoms, and is taking Colace and MiraLAX. She agrees with this plan. MRI ordered; she will follow-up with me next week to discuss Port-A-Cath insertion, next steps.

## 2022-08-29 ENCOUNTER — TELEPHONE (OUTPATIENT)
Dept: SURGERY | Age: 42
End: 2022-08-29

## 2022-08-29 ENCOUNTER — TELEPHONE (OUTPATIENT)
Dept: ONCOLOGY | Age: 42
End: 2022-08-29

## 2022-08-29 DIAGNOSIS — C20 RECTAL ADENOCARCINOMA (HCC): Primary | ICD-10-CM

## 2022-08-29 NOTE — TELEPHONE ENCOUNTER
I called [facility] back and she said she needs to MRI order to be with and without. . Order for MRI pelvis with and without placed at request of Central Scheduling. Will make Dr Derrick Lu aware of new order placed.

## 2022-08-29 NOTE — TELEPHONE ENCOUNTER
Maria L from [facility] Central Scheduling called regarding the MRI order that  sent over for the patient.  She stated the order needs to say with or without contrast.

## 2022-08-30 ENCOUNTER — OFFICE VISIT (OUTPATIENT)
Dept: SURGERY | Age: 42
End: 2022-08-30
Payer: MEDICAID

## 2022-08-30 ENCOUNTER — DOCUMENTATION ONLY (OUTPATIENT)
Dept: ONCOLOGY | Age: 42
End: 2022-08-30

## 2022-08-30 ENCOUNTER — TELEPHONE (OUTPATIENT)
Dept: SURGERY | Age: 42
End: 2022-08-30

## 2022-08-30 ENCOUNTER — HOSPITAL ENCOUNTER (OUTPATIENT)
Dept: GENERAL RADIOLOGY | Age: 42
Discharge: HOME | End: 2022-08-30
Payer: MEDICAID

## 2022-08-30 ENCOUNTER — TELEPHONE (OUTPATIENT)
Dept: ONCOLOGY | Age: 42
End: 2022-08-30

## 2022-08-30 VITALS
DIASTOLIC BLOOD PRESSURE: 88 MMHG | BODY MASS INDEX: 36.34 KG/M2 | TEMPERATURE: 98.3 F | OXYGEN SATURATION: 98 % | RESPIRATION RATE: 20 BRPM | SYSTOLIC BLOOD PRESSURE: 130 MMHG | HEART RATE: 84 BPM | HEIGHT: 62 IN | WEIGHT: 197.5 LBS

## 2022-08-30 DIAGNOSIS — R14.0 ABDOMINAL BLOATING: ICD-10-CM

## 2022-08-30 DIAGNOSIS — C20 RECTAL ADENOCARCINOMA (HCC): ICD-10-CM

## 2022-08-30 DIAGNOSIS — C20 RECTAL ADENOCARCINOMA (HCC): Primary | ICD-10-CM

## 2022-08-30 PROCEDURE — 74022 RADEX COMPL AQT ABD SERIES: CPT

## 2022-08-30 PROCEDURE — 99214 OFFICE O/P EST MOD 30 MIN: CPT | Performed by: SURGERY

## 2022-08-30 NOTE — TELEPHONE ENCOUNTER
I called the patient and she said she is diabetic and does not schedule procedures in the afternoon because of that and she said she was only offered a 3 pm time slot for port placement and wants to know if Dr Sandra Hopkins can see about getting it done in the morning. I told her I will speak to him about it and our  will call her back. Pt in agreement. I spoke with Dr Sandra Hopkins and he will look into getting it moved to an early morning slot.

## 2022-08-30 NOTE — TELEPHONE ENCOUNTER
Call to [facility] with [facility], [address]. Verified add on pathology requested by Provider was in process for [phone]. Specimens sent out on 8/28/22. TAT for HER 2 is estimated at 5 days, BRAF at 7 days, all other testing is listed for 14 days. Update to Provider.

## 2022-08-30 NOTE — PROGRESS NOTES
1. Have you been to the ER, urgent care clinic since your last visit? Hospitalized since your last visit? new patient    2. Have you seen or consulted any other health care providers outside of the [address] [place] [address] since your last visit? Include any pap smears or colon screening.  New patient

## 2022-08-30 NOTE — PROGRESS NOTES
Subjective:      Lorraine Pelayo is a 35 y.o. female presents for reevaluation of rectal cancer identified during resection of pelvic mass by GYN oncology service 29 days ago. Since that time, she has undergone colonoscopy, with confirmation of rectal adenocarcinoma, mid rectum, near obstructing. She has undergone hematology/oncology evaluation, who recommended port placement, initiation of chemotherapy. Patient complains of bloating, feeling of poor stool evacuation despite daily MiraLAX and Colace. She is staying well-hydrated. She denies nausea or vomiting. No abdominal pain. Objective:     Visit Vitals  /88   Pulse 84   Temp 98.3 °F (36.8 °C)   Resp 20   Ht 5' 2\" (1.575 m)   Wt 197 lb 8 oz (89.6 kg)   LMP 07/14/2022 (Approximate)   SpO2 98%   BMI 36.12 kg/m²       General:  alert, cooperative, no distress, appears stated age   Abdomen: Slightly distended, soft. Mild diffuse pain with palpation. Incision:  Midline incision with small area of skin edge separation; no signs of wound infection. Cover sponge reapplied. Assessment:     Rectal adenocarcinoma, likely stage IV given involvement of adnexa at time of resection of pelvic mass 4 weeks ago. Lesion is partially obstructive, and she is on a bowel regimen but still complains of bloating which is new since we last spoke last week. CT chest/abdomen/pelvis pending; rectal MRI pending. We discussed port placement, as she will likely need chemotherapy sooner rather than later, given partially obstructing nature of the this lesion. She agrees with this plan. I also recommended acute series today to assess for signs of large bowel obstruction. Plan:     1. Continue current medications. Increase MiraLAX to twice daily; stay well-hydrated. 2.  Wound care discussed-keep small open portion of wound clean, apply cover sponge. 3.  Staging CT pending; rectal MRI pending. 4.  Acute series today.   5.  Case to be presented at tumor board next week.  6.  We will schedule for Port-A-Cath insertion for next week. 30 minutes spent with patient (greater than 50% of time in face-face consultation reviewing pathology, purpose of MRI, need for port placement, change in bowel regimen, tumor board discussion).

## 2022-08-30 NOTE — PROGRESS NOTES
[address] [facility]  Oncology Social Work  Encounter     Patient Name:  Susan Medrano    Medical History: rectal adenocarcinoma    Advance Directives:    Narrative: Patient returned SW call, patient indicated that she continues to process her diagnosis and is feeling appropriately overwhelmed. SW normalized these feelings and provided supportive counseling. Patient is , has 2 children. She and  have a supportive relationship as well as other family and friends. 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 Patient denies the need for resources at this time. Patient has been talking with her children about diagnosis. Patient looks forward to obtaining results and plan, she remains hopeful.      Barriers to Care:     Plan:SW will follow for support and resources    Referral/Handouts:   None today    Oneil Chapa LCSW

## 2022-08-30 NOTE — TELEPHONE ENCOUNTER
Patient called wanting to discuss the scheduling of the placement of her port and she had some follow up questions about the port.

## 2022-08-31 NOTE — PERIOP NOTES
[address] INSTRUCTIONS    Surgery Date:   9/8/2022    Your surgeon's office or [facility] staff will call you between 4 PM- 8 PM the day before surgery with your arrival time. If your surgery is on a Monday, you will receive a call the preceding Friday. Please report to [facility] Patient Access/Admitting on the 1st floor. Bring your insurance card, photo identification, and any copayment ( if applicable). If you are going home the same day of your surgery, you must have a responsible adult to drive you home. You need to have a responsible adult to stay with you the first 24 hours after surgery and you should not drive a car for 24 hours following your surgery. Do NOT eat any solid foods after midnight the night before surgery including candy, mint or gum. You may drink clear liquids from midnight until 1 hour prior to your arrival. You may drink up to 12 ounces at one time every 4 hours. Please note special instructions, if applicable, below for medications. Do NOT drink alcohol or smoke 24 hours before surgery. STOP smoking for 14 days prior as it helps with breathing and healing after surgery. If you are being admitted to the hospital, please leave personal belongings/luggage in your car until you have an assigned hospital room number. Please wear comfortable clothes. Wear your glasses instead of contacts. We ask that all money, jewelry and valuables be left at home. Wear no make up, particularly mascara, the day of surgery. All body piercings, rings, and jewelry need to be removed and left at home. Please remove any nail polish or artifical nails from your fingernails. Please wear your hair loose or down. Please no pony-tails, buns, or any metal hair accessories. If you shower the morning of surgery, please do not apply any lotions or powders afterwards. You may wear deodorant, unless having breast surgery. Do not shave any body area within 24 hours of your surgery.   Please follow all instructions to avoid any potential surgical cancellation. Should your physical condition change, (i.e. fever, cold, flu, etc.) please notify your surgeon as soon as possible. It is important to be on time. If a situation occurs where you may be delayed, please call:  [phone] / [phone] on the day of surgery. The Preadmission Testing staff can be reached at [phone]. Special instructions:BRING INHALER TO HOSPITAL DOS. No current facility-administered medications for this encounter. Current Outpatient Medications   Medication Sig    polyethylene glycol (MIRALAX) 17 gram packet Take 1 Packet by mouth two (2) times a day for 30 days. methIMAzole (TAPAZOLE) 10 mg tablet Take 10 mg by mouth every morning. FIASP FLEXTOUCH U-100 INSULIN 100 unit/mL (3 mL) inpn 10 Units by SubCUTAneous route three (3) times daily (with meals). 10-20 UNITS, 3 TIMES DAILY WITH MEALS AS NEEDED    Magnesium Oxide 500 mg cap Take 1 Capsule by mouth every morning. pantoprazole (PROTONIX) 40 mg tablet take 1 tablet by mouth once daily (Patient taking differently: Take 40 mg by mouth every morning. take 1 tablet by mouth once daily)    albuterol (PROVENTIL HFA, VENTOLIN HFA, PROAIR HFA) 90 mcg/actuation inhaler Take 2 Puffs by inhalation every four (4) hours as needed for Wheezing. TOUJEO SOLOSTAR 300 unit/mL (1.5 mL) inpn 46 Units by SubCUTAneous route daily (with lunch). NOVOFINE 30 30 gauge x 1/3\"     ONETOUCH VERIO strip     PNV #41/LNKDAAA FUM/FOLIC ACID (PRENATAL MULTIVIT WITH IRON PO) Take 1 Tablet by mouth every morning.           YOU MUST ONLY TAKE THESE MEDICATIONS THE MORNING OF SURGERY WITH A SIP OF WATER: methIMAzole (TAPAZOLE), PANTOPRAZOLE  MEDICATIONS TO TAKE THE MORNING OF SURGERY ONLY IF NEEDED: ALBUTEROL INHALER,   HOLD these prescription medications BEFORE Surgery: VITAMINS, SUPPLEMENTS, MIRALAX  Ask your surgeon/prescribing physician about when/if to STOP taking these medications: INSULIN  Stop all vitamins, herbal medicines and Aspirin containing products 7 days prior to surgery. Stop any non-steroidal anti-inflammatory drugs (i.e. Ibuprofen, Naproxen, Advil, Aleve) 3 days before surgery. You may take Tylenol. If you are currently taking Plavix, Coumadin,or any other blood-thinning/anticoagulant medication contact your prescribing physician for instructions. Eating and Drinking Before Surgery    You may eat a regular dinner at the usual time on the day before your surgery. Do NOT eat any solid foods after midnight unless your arrival time at the hospital is 3pm or later. You may drink clear liquids only from 12 midnight until 1 hours prior to your arrival time at the hospital on the day of your surgery. Do NOT drink alcohol. Clear liquids include:  Water  Fruit juices without pulp( i.e. apple juice)  Carbonated beverages  Black coffee (no cream/milk)  Tea (no cream/milk)  Gatorade  You may drink up to 12-16 ounces at one time every 4 hours between the hours of midnight and 1 hour before your arrival time at the hospital. Example- if your arrival time at the hospital is 6am, you may drink 12-16 ounces of clear liquids no later than 5am.  If your arrival time at the hospital is 3pm or later, you may eat a light breakfast before 8am.  A light breakfast includes: Toast or bagel (no butter)  Black coffee (no cream/milk)  Tea (no cream/milk)  Fruit juices without pulp ( i.e. apple juice)  Do NOT eat meat, eggs, vegetables or fruit  If you have any questions, please contact your surgeon's office. Preventing Infections Before and After - Your Surgery    IMPORTANT INSTRUCTIONS    You play an important role in your health and preparation for surgery. To reduce the germs on your skin you will need to shower with CHG soap (Chorhexidine gluconate 4%) two times before surgery.     CHG soap (Hibiclens, Hex-A-Clens or store brand)  CHG soap will be provided at your Preadmission Testing (PAT) appointment. If you do not have a PAT appointment before surgery, you may arrange to  CHG soap from our office or purchase CHG soap at a pharmacy, grocery or department store. You need to purchase TWO 4 ounce bottles to use for your 2 showers. Steps to follow:  [place] your hair with your normal shampoo and your body with regular soap and rinse well to remove shampoo and soap from your skin. Wet a clean washcloth and turn off the shower. Put CHG soap on washcloth and apply to your entire body from the neck down. Do not use on your head, face or private parts(genitals). Do not use CHG soap on open sores, wounds or areas of skin irritation. Wash you body gently for 5 minutes. Do not wash your skin too hard. This soap does not create lather. Pay special attention to your underarms and from your belly button to your feet. Turn the shower back on and rinse well to get CHG soap off your body. Pat your skin dry with a clean, dry towel. Do not apply lotions or moisturizer. Put on clean clothes and sleep on fresh bed sheets and do not allow pets to sleep with you. Shower with CHG soap 2 times before your surgery  The evening before your surgery  The morning of your surgery      Tips to help prevent infections after your surgery:  Protect your surgical wound from germs:  Hand washing is the most important thing you and your caregivers can do to prevent infections. Keep your bandage clean and dry! Do not touch your surgical wound. Use clean, freshly washed towels and washcloths every time you shower; do not share bath linens with others. Until your surgical wound is healed, wear clothing and sleep on bed linens each day that are clean and freshly washed. Do not allow pets to sleep in your bed with you or touch your surgical wound. Do not smoke - smoking delays wound healing. This may be a good time to stop smoking.   If you have diabetes, it is important for you to manage your blood sugar levels properly before your surgery as well as after your surgery. Poorly managed blood sugar levels slow down wound healing and prevent you from healing completely. Patient Information Regarding COVID Restrictions      Day of Procedure    Please park in the parking deck or any designated visitor parking lot. Enter the facility through the Main Entrance of the hospital.  On the day of surgery, please provide the cell phone number of the person who will be waiting for you to the Patient Access representative at the time of registration. Please wear a mask on the day of your procedure. We are now allowing two designated visitors per stay. Pediatric patients may have 2 designated visitors. These two people may come in with you on the day of your procedure. The designated visitor must also wear a mask. Once your procedure and the immediate recovery period is completed, a nurse in the recovery area will contact your designated visitor to inform them of your room number or to otherwise review other pertinent information regarding your care. Social distancing practices are to be adhered to in waiting areas and the cafeteria. The patient was contacted  via phone. She verbalized understanding of all instructions does not  need reinforcement.  9/8/ normal...

## 2022-09-01 RX ORDER — BUPIVACAINE HYDROCHLORIDE 5 MG/ML
50 INJECTION, SOLUTION EPIDURAL; INTRACAUDAL ONCE
Status: CANCELLED | OUTPATIENT
Start: 2022-09-01 | End: 2022-09-01

## 2022-09-07 ENCOUNTER — HOSPITAL ENCOUNTER (OUTPATIENT)
Dept: CT IMAGING | Age: 42
Discharge: HOME | End: 2022-09-07
Attending: INTERNAL MEDICINE
Payer: MEDICAID

## 2022-09-07 DIAGNOSIS — C79.89 RECTAL CANCER METASTASIZED TO PELVIS (HCC): ICD-10-CM

## 2022-09-07 DIAGNOSIS — Z80.0 FAMILY HISTORY OF RECTAL CANCER: ICD-10-CM

## 2022-09-07 DIAGNOSIS — Z98.890 STATUS POST SURGERY: ICD-10-CM

## 2022-09-07 DIAGNOSIS — C20 RECTAL CANCER (HCC): ICD-10-CM

## 2022-09-07 DIAGNOSIS — C20 RECTAL CANCER METASTASIZED TO PELVIS (HCC): ICD-10-CM

## 2022-09-07 PROCEDURE — 74011000636 HC RX REV CODE- 636: Performed by: INTERNAL MEDICINE

## 2022-09-07 PROCEDURE — 74177 CT ABD & PELVIS W/CONTRAST: CPT

## 2022-09-07 PROCEDURE — 71260 CT THORAX DX C+: CPT

## 2022-09-07 RX ORDER — BUPIVACAINE HYDROCHLORIDE 5 MG/ML
50 INJECTION, SOLUTION EPIDURAL; INTRACAUDAL ONCE
Status: CANCELLED | OUTPATIENT
Start: 2022-09-07 | End: 2022-09-07

## 2022-09-07 RX ADMIN — IOPAMIDOL 100 ML: 755 INJECTION, SOLUTION INTRAVENOUS at 10:46

## 2022-09-08 ENCOUNTER — APPOINTMENT (OUTPATIENT)
Dept: GENERAL RADIOLOGY | Age: 42
End: 2022-09-08
Attending: SURGERY
Payer: MEDICAID

## 2022-09-08 ENCOUNTER — ANESTHESIA (OUTPATIENT)
Dept: SURGERY | Age: 42
End: 2022-09-08
Payer: MEDICAID

## 2022-09-08 ENCOUNTER — HOSPITAL ENCOUNTER (OUTPATIENT)
Age: 42
Setting detail: OUTPATIENT SURGERY
Discharge: HOME | End: 2022-09-08
Attending: SURGERY | Admitting: SURGERY
Payer: MEDICAID

## 2022-09-08 ENCOUNTER — ANESTHESIA EVENT (OUTPATIENT)
Dept: SURGERY | Age: 42
End: 2022-09-08
Payer: MEDICAID

## 2022-09-08 VITALS
HEART RATE: 70 BPM | WEIGHT: 195.11 LBS | DIASTOLIC BLOOD PRESSURE: 90 MMHG | HEIGHT: 62 IN | TEMPERATURE: 97 F | SYSTOLIC BLOOD PRESSURE: 133 MMHG | OXYGEN SATURATION: 100 % | RESPIRATION RATE: 16 BRPM | BODY MASS INDEX: 35.9 KG/M2

## 2022-09-08 DIAGNOSIS — Z98.890 HISTORY OF INFUSAPORT CENTRAL VENOUS CATHETER INSERTION: ICD-10-CM

## 2022-09-08 DIAGNOSIS — Z95.828 PORT-A-CATH IN PLACE: Primary | ICD-10-CM

## 2022-09-08 DIAGNOSIS — C20 RECTAL ADENOCARCINOMA (HCC): ICD-10-CM

## 2022-09-08 LAB
GLUCOSE BLD STRIP.AUTO-MCNC: 136 MG/DL (ref 65–117)
GLUCOSE BLD STRIP.AUTO-MCNC: 189 MG/DL (ref 65–117)
SERVICE CMNT-IMP: ABNORMAL
SERVICE CMNT-IMP: ABNORMAL

## 2022-09-08 PROCEDURE — 77030040922 HC BLNKT HYPOTHRM STRY -A

## 2022-09-08 PROCEDURE — 74011250637 HC RX REV CODE- 250/637: Performed by: ANESTHESIOLOGY

## 2022-09-08 PROCEDURE — 76210000016 HC OR PH I REC 1 TO 1.5 HR: Performed by: SURGERY

## 2022-09-08 PROCEDURE — 77030002986 HC SUT PROL J&J -A: Performed by: SURGERY

## 2022-09-08 PROCEDURE — 74011250636 HC RX REV CODE- 250/636: Performed by: SURGERY

## 2022-09-08 PROCEDURE — 74011250636 HC RX REV CODE- 250/636

## 2022-09-08 PROCEDURE — 74011250636 HC RX REV CODE- 250/636: Performed by: NURSE ANESTHETIST, CERTIFIED REGISTERED

## 2022-09-08 PROCEDURE — 76060000033 HC ANESTHESIA 1 TO 1.5 HR: Performed by: SURGERY

## 2022-09-08 PROCEDURE — 74011250636 HC RX REV CODE- 250/636: Performed by: ANESTHESIOLOGY

## 2022-09-08 PROCEDURE — C1788 PORT, INDWELLING, IMP: HCPCS | Performed by: SURGERY

## 2022-09-08 PROCEDURE — 71045 X-RAY EXAM CHEST 1 VIEW: CPT

## 2022-09-08 PROCEDURE — 77030002933 HC SUT MCRYL J&J -A: Performed by: SURGERY

## 2022-09-08 PROCEDURE — 74011000250 HC RX REV CODE- 250: Performed by: NURSE ANESTHETIST, CERTIFIED REGISTERED

## 2022-09-08 PROCEDURE — 74011000250 HC RX REV CODE- 250: Performed by: SURGERY

## 2022-09-08 PROCEDURE — 82962 GLUCOSE BLOOD TEST: CPT

## 2022-09-08 PROCEDURE — 76010000149 HC OR TIME 1 TO 1.5 HR: Performed by: SURGERY

## 2022-09-08 PROCEDURE — 77030042556 HC PNCL CAUT -B: Performed by: SURGERY

## 2022-09-08 PROCEDURE — 74011000258 HC RX REV CODE- 258: Performed by: NURSE ANESTHETIST, CERTIFIED REGISTERED

## 2022-09-08 PROCEDURE — 2709999900 HC NON-CHARGEABLE SUPPLY: Performed by: SURGERY

## 2022-09-08 PROCEDURE — 36561 INSERT TUNNELED CV CATH: CPT | Performed by: SURGERY

## 2022-09-08 PROCEDURE — 77030031139 HC SUT VCRL2 J&J -A: Performed by: SURGERY

## 2022-09-08 PROCEDURE — 77030010507 HC ADH SKN DERMBND J&J -B: Performed by: SURGERY

## 2022-09-08 PROCEDURE — 76210000021 HC REC RM PH II 0.5 TO 1 HR: Performed by: SURGERY

## 2022-09-08 DEVICE — POWERPORT IMPLANTABLE PORT WITH ATTACHABLE 8F CHRONOFLEX OPEN-ENDED SINGLE-LUMEN VENOUS CATHETER INTERMEDIATE KIT (WITH SUTURE PLUGS)
Type: IMPLANTABLE DEVICE | Site: CHEST | Status: FUNCTIONAL
Brand: POWERPORT, CHRONOFLEX

## 2022-09-08 RX ORDER — SODIUM CHLORIDE, SODIUM LACTATE, POTASSIUM CHLORIDE, CALCIUM CHLORIDE 600; 310; 30; 20 MG/100ML; MG/100ML; MG/100ML; MG/100ML
75 INJECTION, SOLUTION INTRAVENOUS CONTINUOUS
Status: DISCONTINUED | OUTPATIENT
Start: 2022-09-08 | End: 2022-09-08 | Stop reason: HOSPADM

## 2022-09-08 RX ORDER — MIDAZOLAM HYDROCHLORIDE 1 MG/ML
1 INJECTION, SOLUTION INTRAMUSCULAR; INTRAVENOUS AS NEEDED
Status: DISCONTINUED | OUTPATIENT
Start: 2022-09-08 | End: 2022-09-08 | Stop reason: HOSPADM

## 2022-09-08 RX ORDER — ESMOLOL HYDROCHLORIDE 10 MG/ML
INJECTION INTRAVENOUS
Status: DISCONTINUED
Start: 2022-09-08 | End: 2022-09-08 | Stop reason: HOSPADM

## 2022-09-08 RX ORDER — OXYCODONE AND ACETAMINOPHEN 5; 325 MG/1; MG/1
1 TABLET ORAL
Qty: 15 TABLET | Refills: 0 | Status: SHIPPED | OUTPATIENT
Start: 2022-09-08 | End: 2022-09-11

## 2022-09-08 RX ORDER — MIDAZOLAM HYDROCHLORIDE 1 MG/ML
INJECTION, SOLUTION INTRAMUSCULAR; INTRAVENOUS AS NEEDED
Status: DISCONTINUED | OUTPATIENT
Start: 2022-09-08 | End: 2022-09-08 | Stop reason: HOSPADM

## 2022-09-08 RX ORDER — MIDAZOLAM HYDROCHLORIDE 1 MG/ML
0.5 INJECTION, SOLUTION INTRAMUSCULAR; INTRAVENOUS
Status: DISCONTINUED | OUTPATIENT
Start: 2022-09-08 | End: 2022-09-08 | Stop reason: HOSPADM

## 2022-09-08 RX ORDER — TRAMADOL HYDROCHLORIDE 50 MG/1
TABLET ORAL
Status: DISCONTINUED
Start: 2022-09-08 | End: 2022-09-08 | Stop reason: HOSPADM

## 2022-09-08 RX ORDER — FENTANYL CITRATE 50 UG/ML
25 INJECTION, SOLUTION INTRAMUSCULAR; INTRAVENOUS
Status: DISCONTINUED | OUTPATIENT
Start: 2022-09-08 | End: 2022-09-08 | Stop reason: HOSPADM

## 2022-09-08 RX ORDER — SODIUM CHLORIDE 0.9 % (FLUSH) 0.9 %
5-40 SYRINGE (ML) INJECTION EVERY 8 HOURS
Status: DISCONTINUED | OUTPATIENT
Start: 2022-09-08 | End: 2022-09-08 | Stop reason: HOSPADM

## 2022-09-08 RX ORDER — PROPOFOL 10 MG/ML
INJECTION, EMULSION INTRAVENOUS
Status: DISCONTINUED | OUTPATIENT
Start: 2022-09-08 | End: 2022-09-08 | Stop reason: HOSPADM

## 2022-09-08 RX ORDER — KETOROLAC TROMETHAMINE 30 MG/ML
15 INJECTION, SOLUTION INTRAMUSCULAR; INTRAVENOUS
Status: COMPLETED | OUTPATIENT
Start: 2022-09-08 | End: 2022-09-08

## 2022-09-08 RX ORDER — GLYCOPYRROLATE 0.2 MG/ML
INJECTION INTRAMUSCULAR; INTRAVENOUS AS NEEDED
Status: DISCONTINUED | OUTPATIENT
Start: 2022-09-08 | End: 2022-09-08 | Stop reason: HOSPADM

## 2022-09-08 RX ORDER — OXYCODONE AND ACETAMINOPHEN 5; 325 MG/1; MG/1
1 TABLET ORAL AS NEEDED
Status: DISCONTINUED | OUTPATIENT
Start: 2022-09-08 | End: 2022-09-08 | Stop reason: HOSPADM

## 2022-09-08 RX ORDER — SODIUM CHLORIDE 0.9 % (FLUSH) 0.9 %
5-40 SYRINGE (ML) INJECTION AS NEEDED
Status: DISCONTINUED | OUTPATIENT
Start: 2022-09-08 | End: 2022-09-08 | Stop reason: HOSPADM

## 2022-09-08 RX ORDER — HYDROMORPHONE HYDROCHLORIDE 1 MG/ML
0.2 INJECTION, SOLUTION INTRAMUSCULAR; INTRAVENOUS; SUBCUTANEOUS
Status: DISCONTINUED | OUTPATIENT
Start: 2022-09-08 | End: 2022-09-08 | Stop reason: HOSPADM

## 2022-09-08 RX ORDER — TRAMADOL HYDROCHLORIDE 50 MG/1
50 TABLET ORAL
Status: DISCONTINUED | OUTPATIENT
Start: 2022-09-08 | End: 2022-09-08

## 2022-09-08 RX ORDER — SODIUM CHLORIDE 9 MG/ML
50 INJECTION, SOLUTION INTRAVENOUS CONTINUOUS
Status: DISCONTINUED | OUTPATIENT
Start: 2022-09-08 | End: 2022-09-08 | Stop reason: HOSPADM

## 2022-09-08 RX ORDER — LIDOCAINE HYDROCHLORIDE 20 MG/ML
INJECTION, SOLUTION EPIDURAL; INFILTRATION; INTRACAUDAL; PERINEURAL AS NEEDED
Status: DISCONTINUED | OUTPATIENT
Start: 2022-09-08 | End: 2022-09-08 | Stop reason: HOSPADM

## 2022-09-08 RX ORDER — FENTANYL CITRATE 50 UG/ML
INJECTION, SOLUTION INTRAMUSCULAR; INTRAVENOUS AS NEEDED
Status: DISCONTINUED | OUTPATIENT
Start: 2022-09-08 | End: 2022-09-08 | Stop reason: HOSPADM

## 2022-09-08 RX ORDER — DIPHENHYDRAMINE HYDROCHLORIDE 50 MG/ML
12.5 INJECTION, SOLUTION INTRAMUSCULAR; INTRAVENOUS AS NEEDED
Status: DISCONTINUED | OUTPATIENT
Start: 2022-09-08 | End: 2022-09-08 | Stop reason: HOSPADM

## 2022-09-08 RX ORDER — ONDANSETRON 2 MG/ML
INJECTION INTRAMUSCULAR; INTRAVENOUS AS NEEDED
Status: DISCONTINUED | OUTPATIENT
Start: 2022-09-08 | End: 2022-09-08 | Stop reason: HOSPADM

## 2022-09-08 RX ORDER — BUPIVACAINE HYDROCHLORIDE 2.5 MG/ML
INJECTION, SOLUTION EPIDURAL; INFILTRATION; INTRACAUDAL AS NEEDED
Status: DISCONTINUED | OUTPATIENT
Start: 2022-09-08 | End: 2022-09-08 | Stop reason: HOSPADM

## 2022-09-08 RX ORDER — ONDANSETRON 2 MG/ML
4 INJECTION INTRAMUSCULAR; INTRAVENOUS AS NEEDED
Status: DISCONTINUED | OUTPATIENT
Start: 2022-09-08 | End: 2022-09-08 | Stop reason: HOSPADM

## 2022-09-08 RX ORDER — KETOROLAC TROMETHAMINE 30 MG/ML
INJECTION, SOLUTION INTRAMUSCULAR; INTRAVENOUS
Status: COMPLETED
Start: 2022-09-08 | End: 2022-09-08

## 2022-09-08 RX ORDER — TRAMADOL HYDROCHLORIDE 50 MG/1
50 TABLET ORAL
Qty: 12 TABLET | Refills: 0 | Status: SHIPPED | OUTPATIENT
Start: 2022-09-08 | End: 2022-09-11

## 2022-09-08 RX ORDER — MORPHINE SULFATE 2 MG/ML
2 INJECTION, SOLUTION INTRAMUSCULAR; INTRAVENOUS
Status: DISCONTINUED | OUTPATIENT
Start: 2022-09-08 | End: 2022-09-08 | Stop reason: HOSPADM

## 2022-09-08 RX ORDER — HEPARIN 100 UNIT/ML
SYRINGE INTRAVENOUS AS NEEDED
Status: DISCONTINUED | OUTPATIENT
Start: 2022-09-08 | End: 2022-09-08 | Stop reason: HOSPADM

## 2022-09-08 RX ORDER — LIDOCAINE HYDROCHLORIDE 10 MG/ML
0.1 INJECTION, SOLUTION EPIDURAL; INFILTRATION; INTRACAUDAL; PERINEURAL AS NEEDED
Status: DISCONTINUED | OUTPATIENT
Start: 2022-09-08 | End: 2022-09-08 | Stop reason: HOSPADM

## 2022-09-08 RX ORDER — FENTANYL CITRATE 50 UG/ML
50 INJECTION, SOLUTION INTRAMUSCULAR; INTRAVENOUS AS NEEDED
Status: DISCONTINUED | OUTPATIENT
Start: 2022-09-08 | End: 2022-09-08 | Stop reason: HOSPADM

## 2022-09-08 RX ADMIN — PROPOFOL 100 MCG/KG/MIN: 10 INJECTION, EMULSION INTRAVENOUS at 07:36

## 2022-09-08 RX ADMIN — WATER 2 G: 1 INJECTION INTRAMUSCULAR; INTRAVENOUS; SUBCUTANEOUS at 07:36

## 2022-09-08 RX ADMIN — FENTANYL CITRATE 25 MCG: 50 INJECTION, SOLUTION INTRAMUSCULAR; INTRAVENOUS at 08:24

## 2022-09-08 RX ADMIN — DEXMEDETOMIDINE HYDROCHLORIDE 10 MCG: 100 INJECTION, SOLUTION, CONCENTRATE INTRAVENOUS at 07:45

## 2022-09-08 RX ADMIN — KETOROLAC TROMETHAMINE 15 MG: 30 INJECTION, SOLUTION INTRAMUSCULAR at 09:02

## 2022-09-08 RX ADMIN — PROPOFOL 50 MG: 10 INJECTION, EMULSION INTRAVENOUS at 07:37

## 2022-09-08 RX ADMIN — GLYCOPYRROLATE 0.2 MG: 0.2 INJECTION INTRAMUSCULAR; INTRAVENOUS at 07:36

## 2022-09-08 RX ADMIN — LIDOCAINE HYDROCHLORIDE 40 MG: 20 INJECTION, SOLUTION EPIDURAL; INFILTRATION; INTRACAUDAL; PERINEURAL at 07:36

## 2022-09-08 RX ADMIN — SODIUM CHLORIDE, POTASSIUM CHLORIDE, SODIUM LACTATE AND CALCIUM CHLORIDE: 600; 310; 30; 20 INJECTION, SOLUTION INTRAVENOUS at 08:36

## 2022-09-08 RX ADMIN — TRAMADOL HYDROCHLORIDE 50 MG: 50 TABLET, COATED ORAL at 10:22

## 2022-09-08 RX ADMIN — KETOROLAC TROMETHAMINE 15 MG: 30 INJECTION, SOLUTION INTRAMUSCULAR; INTRAVENOUS at 09:15

## 2022-09-08 RX ADMIN — ONDANSETRON HYDROCHLORIDE 4 MG: 2 INJECTION, SOLUTION INTRAMUSCULAR; INTRAVENOUS at 07:36

## 2022-09-08 RX ADMIN — DEXMEDETOMIDINE HYDROCHLORIDE 10 MCG: 100 INJECTION, SOLUTION, CONCENTRATE INTRAVENOUS at 07:36

## 2022-09-08 RX ADMIN — SODIUM CHLORIDE, POTASSIUM CHLORIDE, SODIUM LACTATE AND CALCIUM CHLORIDE 75 ML/HR: 600; 310; 30; 20 INJECTION, SOLUTION INTRAVENOUS at 06:37

## 2022-09-08 RX ADMIN — FENTANYL CITRATE 25 MCG: 50 INJECTION, SOLUTION INTRAMUSCULAR; INTRAVENOUS at 08:00

## 2022-09-08 RX ADMIN — MIDAZOLAM 2 MG: 1 INJECTION INTRAMUSCULAR; INTRAVENOUS at 07:25

## 2022-09-08 RX ADMIN — Medication 15 MG: at 09:15

## 2022-09-08 NOTE — PROGRESS NOTES
Will d/w Dr Lashanda Kilgore since post surgery changes  No overt metastatic cancer
Initial (On Arrival)

## 2022-09-08 NOTE — ANESTHESIA POSTPROCEDURE EVALUATION
Procedure(s):  PORT-A-CATH PLACEMENT. MAC    Anesthesia Post Evaluation      Multimodal analgesia: multimodal analgesia used between 6 hours prior to anesthesia start to PACU discharge  Patient location during evaluation: PACU  Patient participation: complete - patient participated  Level of consciousness: awake and alert  Pain management: adequate  Airway patency: patent  Anesthetic complications: no  Cardiovascular status: acceptable  Respiratory status: acceptable  Hydration status: acceptable  Comments: Seen, no complaints   Post anesthesia nausea and vomiting:  none  Final Post Anesthesia Temperature Assessment:  Normothermia (36.0-37.5 degrees C)      INITIAL Post-op Vital signs:   Vitals Value Taken Time   /100 09/08/22 0900   Temp 36.1 °C (97 °F) 09/08/22 0848   Pulse 100 09/08/22 0915   Resp 14 09/08/22 0915   SpO2 100 % 09/08/22 0915   Vitals shown include unvalidated device data.

## 2022-09-08 NOTE — BRIEF OP NOTE
Brief Postoperative Note    Patient: Dago Burgos  YOB: 1980  MRN: [id number]    Date of Procedure: 9/8/2022     Pre-Op Diagnosis: ADVANCED RECTAL CANCER    Post-Op Diagnosis: Same as preoperative diagnosis. Procedure(s):  PORT-A-CATH PLACEMENT    Surgeon(s):  Guera Sierra MD    Surgical Assistant: None    Anesthesia: General     Estimated Blood Loss (mL): Minimal    Complications: None    Specimens: * No specimens in log *     Implants:   Implant Name Type Inv.  Item Serial No.  Lot No. LRB No. Used Action   PORT ATTACH CATH POWERPORT 8FR --  - SNA  PORT ATTACH CATH POWERPORT 8FR --  NA [employer]WD [id number] 1 Implanted       Drains: * No LDAs found *    Findings: Placement of 8 Romansh port system into SVC via right subclavian vein    Electronically Signed by Debi Back MD on 9/8/2022 at 8:44 AM

## 2022-09-08 NOTE — ANESTHESIA PREPROCEDURE EVALUATION
Relevant Problems   No relevant active problems       Anesthetic History   No history of anesthetic complications            Review of Systems / Medical History  Patient summary reviewed, nursing notes reviewed and pertinent labs reviewed    Pulmonary            Asthma        Neuro/Psych     seizures: well controlled        Comments: Retinopathy due to secondary diabetes ([facility] (E13.319) Cardiovascular                  Exercise tolerance: >4 METS     GI/Hepatic/Renal     GERD           Endo/Other    Diabetes  Hypothyroidism  Obesity and cancer    Comments: Pelvic mass  Rectal adenocarcinoma ([facility]     Other Findings   Comments: Denies pregnancy           Physical Exam    Airway  Mallampati: III  TM Distance: 4 - 6 cm  Neck ROM: normal range of motion   Mouth opening: Normal     Cardiovascular  Regular rate and rhythm,  S1 and S2 normal,  no murmur, click, rub, or gallop  Rhythm: regular  Rate: normal         Dental  No notable dental hx       Pulmonary  Breath sounds clear to auscultation               Abdominal  GI exam deferred       Other Findings            Anesthetic Plan    ASA: 2  Anesthesia type: MAC          Induction: Intravenous  Anesthetic plan and risks discussed with: Patient

## 2022-09-08 NOTE — H&P
Subjective:      Lito Xavier is a 35 y.o. female with rectal cancer identified during resection of pelvic mass by GYN oncology service 5 weeks ago. Since that time, she has undergone colonoscopy, with confirmation of rectal adenocarcinoma, mid rectum, near obstructing. She has undergone hematology/oncology evaluation, who recommended port placement, initiation of chemotherapy. Patient complains of bloating, feeling of poor stool evacuation despite daily MiraLAX and Colace. She is staying well-hydrated. She denies nausea or vomiting. No abdominal pain.     Patient Active Problem List    Diagnosis Date Noted    Abdominal bloating 2022    Rectal adenocarcinoma ([address] 2022    Pelvic mass 2022    Obesity, morbid ([address] 2018    ANGULO (dyspnea on exertion) 2016    Type 1 diabetes mellitus with complications ([address]     Obesity (BMI 30-39.9) 2016    Pneumonia, organism unspecified(486) 2011    Gall bladder stones 2011     Past Medical History:   Diagnosis Date    Asthma     Cancer ([address]     COLON RECTAL CANCER    Diabetes ([address]     Gastrointestinal disorder     GERD    GERD (gastroesophageal reflux disease)     Retinopathy due to secondary diabetes ([address]     Seizures ([address]     Thyroid disease     HYPERTHYROIDISM      Past Surgical History:   Procedure Laterality Date    COLONOSCOPY N/A 2022    COLONOSCOPY performed by Chepe Yao MD at [facility] ENDOSCOPY    HX APPENDECTOMY       [address]  2012    HX  SECTION  2015    HX CHOLECYSTECTOMY  2013    HX GI      HX HEENT Bilateral     EYE LID SURGERY & SECOND SURGERY ON RIGHT LID    HX HYSTERECTOMY  2022    TUMOR/MASS REMOVED & APPENDEX    HX LIPOMA RESECTION  2010    HX ORTHOPAEDIC Right     Carpul jesus manuel    HX OTHER SURGICAL Right     INJECTION RIGHT EYE FOR DIABETIC RETINOPATHY      Social History     Tobacco Use    Smoking status: Never    Smokeless tobacco: Never Substance Use Topics    Alcohol use: No     Alcohol/week: 0.0 standard drinks      Family History   Problem Relation Age of Onset    Thyroid Disease Mother     Myasthenia Gravis Mother     Heart Disease Father     Cancer Father 61        colon or rectal cancer    Alcohol abuse Father     No Known Problems Sister     Cancer Paternal Aunt         Breast cancer    Arthritis-rheumatoid Paternal Aunt     Breast Cancer Paternal Aunt     Cancer Paternal Aunt         breast cancer    Breast Cancer Paternal Aunt     Breast Cancer Paternal Grandmother     Breast Cancer Other         great paternal aunt    Cancer Other         paternal aunt, ovarian cancer    Anesth Problems Neg Hx       Current Facility-Administered Medications   Medication Dose Route Frequency    ceFAZolin (ANCEF) 2 g in sterile water (preservative free) 20 mL IV syringe  2 g IntraVENous ON CALL TO OR      Allergies   Allergen Reactions    Aspirin Itching    Eliquis [Apixaban] Itching and Swelling    Orange Swelling    Sulfa (Sulfonamide Antibiotics) Nausea and Vomiting    Sudafed [Pseudoephedrine Hcl] Other (comments)     Severe head ache       Review of Systems:    A complete review of systems was negative except as noted in the HPI. Objective:      Visit Vitals  Ht 5' 2\" (1.575 m)   Wt 195 lb (88.5 kg)   LMP 07/14/2022 (Approximate)   BMI 35.67 kg/m²       Physical Exam:  GENERAL: alert, cooperative, no distress, appears stated age, morbidly obese, EYE: negative, THROAT & NECK: normal, LUNG: clear to auscultation bilaterally, HEART: regular rate and rhythm, S1, S2 normal, no murmur. ABDOMEN: Obese, nondistended, soft. Healing midline scar. No pain with palpation, mass, or hernia. Area of skin edge separation is healed. EXTREMITIES:  extremities normal, atraumatic, no cyanosis or edema, SKIN: Normal., NEUROLOGIC: negative    Imaging:  reviewed  CT    Assessment:     Stage IV rectal adenocarcinoma with near obstructing primary lesion.   She will need chronic venous access for initiation of adjuvant chemotherapy. Plan:     1. I recommend proceeding with Port-A-Cath insertion. 2. Discussed aspects of surgical intervention, methods, risks (including by not limited to infection, bleeding, hematoma, injury to surrounding structures, port malfunction, progression of disease, need for diversion), and the risks of the anesthetic. The patient understands the risks; all questions were answered to the patient's satisfaction. 3. Patient wishes to proceed with surgery.

## 2022-09-08 NOTE — PERIOP NOTES
Patient complaining of pain but does not want Fentanyl, Morphine, or Dilaudid. Dr. Linwood Polanco contacted. Telephone order with read back given for 15 mg of IV Toradol one time. 3311 Patient continues to complain of pain. Dr. Linwood Polanco at bedside. Verbal order given with read back for another 15 mg of IV Toradol one time. 1000 Patient complaining of right shoulder pain the radiates down arm. Patient denies chest pain or shortness of breath. Normal sinus rhythm on monitor. Patient endorses numbness and tingling in hand. Dr. Linwood Polanco contacted. Per Dr. Linwood Polanco, will come see patient. Per Dr. Linwood Polanco, inform Dr. Lissett Wall. 1010 Dr Lissett Wall aware. Per Dr. Lissett Wall, give patient tramadol 50 mg PO one time. 80 Dr. Linwood Polanco at bedside. Per Dr. Linwood Polanco give one time dose of PO Tramadol 50 mg.     1022 Tramadol given    1030 Patient states numbness and tingling is going away    1039 I have reviewed discharge instructions in person with the patient and  using teach back method. The patient and  verbalized understanding. Medications, signs, symptoms, and side effects reviewed. Opportunity for questions and clarification allowed. Patient discharging home via private vehicle. Hard-copy of discharge instructions given to patient. Copy of discharge instructions signed and placed on patient's chart. 1047 Chest XR resulted. No acute cardiopulmonary process. Status post right subclavian. Port-A-Cath placement without evidence of pneumothorax.

## 2022-09-09 ENCOUNTER — TELEPHONE (OUTPATIENT)
Dept: SURGERY | Age: 42
End: 2022-09-09

## 2022-09-09 NOTE — OP NOTES
[facility]  OPERATIVE REPORT    Name:  Agus Murillo  MR#:  [id number]  :  1980  ACCOUNT #:  [de-identified]  DATE OF SERVICE:  2022    PREOPERATIVE DIAGNOSIS:  Stage IV rectal cancer. POSTOPERATIVE DIAGNOSIS:  Stage IV rectal cancer. PROCEDURE PERFORMED:  Port-A-Cath insertion (CPT F6231730). SURGEON:  Jackie Velarde MD    ASSISTANT:  Not applicable. ANESTHESIA:  Local with IV sedation. COMPLICATIONS:  None. SPECIMENS REMOVED:  None. IMPLANTS:  Port-A-Cath system. ESTIMATED BLOOD LOSS:  30 mL. DRAINS:  None. COUNTS:  Sponge count correct. Needle count correct. INDICATIONS:  The patient is a 58-year-old white female with type 1 diabetes mellitus, recently diagnosed with stage IV rectal cancer,  with a large mid rectum mass identified at time of resection of the pelvic mass. This was confirmed on colonoscopy, which revealed moderately differentiated, near-obstructing cancer. She will need chronic venous access for chemotherapy. She is taken to the operating room today for Port-A-Cath insertion. FINDINGS:  Insertion of 8-Tristanian Port-A-Cath system into superior vena cava via right subclavian vein. PROCEDURE:  The patient was identified as the correct patient in the preoperative holding area and informed consent was confirmed. After answering the patient's remaining questions, she was taken to the operating room and placed on the operating room table in the supine position. Sequential compression devices were placed on both lower extremities. Following uneventful initiation of intravenous sedation, a shoulder roll was placed behind her upper back, and all potential pressure points were padded with eggcrate. Her arms were tucked to her sides. Her upper chest and both necks were prepped and draped in the usual sterile fashion. Final time-out was performed, and it was confirmed she had received intravenous antibiotics.   The patient was placed in a steep Trendelenburg position. The right subclavian vein was accessed using an 18-Bahamian needle with syringe, and a wire was passed through the needle using Seldinger technique. Fluoroscopy confirmed the wire was within the right ventricle. The needle was removed, and the patient was returned to the supine position. A 2.5-cm skin incision was made inferior to the venipuncture site, after anesthetizing the area with 0.5% Marcaine without epinephrine. The dissection was carried through the subcutaneous fatty tissue, down to the fascia of the pectoralis major. A plane was developed along this layer, to allow room for placement of the port. A tunneler was used to create a tract between the venipuncture site and the port wound, passing the 8-Bahamian catheter between the 2 sites. A sufficient length of catheter was measured, cut, and attached to a port, and the port was secured to the pectoralis fascia with interrupted 2-0 Prolene sutures. The tract along the wire was dilated with a dilator with peel-apart sheath, followed by removal of the dilator and wire. The catheter, after flushing this system, was passed through the peel-apart sheath, followed by removal of the sheath. Satisfactory aspiration of blood and forward flow of heparinized saline was confirmed, and fluoroscopy confirmed the tip of the catheter lying within the superior vena cava. The wound was irrigated with sterile saline. After confirming satisfactory hemostasis, the packing solution was instilled into the system, followed by closure of the deep dermal layer with a running 3-0 Vicryl suture. All skin edges were reapproximated with a combination of subcuticular 4-0 Monocryl suture and Dermabond. The patient tolerated the procedure well. She was transferred to the recovery area in stable condition. The attending surgeon, Dr. Babar Lees, was scrubbed and present for the entire procedure.       Wale Dietrich MD [id number]  D:  09/08/2022 20:03  T:  09/09/2022 2:25  JOB #:  [id number]

## 2022-09-09 NOTE — TELEPHONE ENCOUNTER
Fri 09-Sep-22 07:27a  ======================================  Name: Vianca Penaloza Dr.: Willa Fleming        Patient: Caller          PtDOB: [birth year]          Phone: [phone]        Message: Pls cl.   Redness is  spreading where the port was put in  yesterday,

## 2022-09-09 NOTE — TELEPHONE ENCOUNTER
I called the patient back and she said her skin is red around where her port was placed, she said she took a wash rag and tried to wash of the scrub that was used. She said she has taking an antihistamine which has helped some but now is wondering if it could be the glue. I asked her to send in a picture through my chart and if not we can see her in the office today. Pt said she will send me a picture. Pt in agreement.

## 2022-09-10 ENCOUNTER — HOSPITAL ENCOUNTER (EMERGENCY)
Age: 42
Discharge: HOME | End: 2022-09-10
Attending: STUDENT IN AN ORGANIZED HEALTH CARE EDUCATION/TRAINING PROGRAM
Payer: MEDICAID

## 2022-09-10 VITALS
SYSTOLIC BLOOD PRESSURE: 143 MMHG | OXYGEN SATURATION: 98 % | TEMPERATURE: 98.4 F | HEIGHT: 62 IN | RESPIRATION RATE: 16 BRPM | BODY MASS INDEX: 35.88 KG/M2 | WEIGHT: 195 LBS | HEART RATE: 84 BPM | DIASTOLIC BLOOD PRESSURE: 84 MMHG

## 2022-09-10 DIAGNOSIS — L23.9 ALLERGIC DERMATITIS: Primary | ICD-10-CM

## 2022-09-10 PROCEDURE — 99283 EMERGENCY DEPT VISIT LOW MDM: CPT

## 2022-09-10 PROCEDURE — 74011250637 HC RX REV CODE- 250/637: Performed by: STUDENT IN AN ORGANIZED HEALTH CARE EDUCATION/TRAINING PROGRAM

## 2022-09-10 RX ORDER — TRIAMCINOLONE ACETONIDE 1 MG/G
CREAM TOPICAL 2 TIMES DAILY
Qty: 15 G | Refills: 0 | Status: SHIPPED | OUTPATIENT
Start: 2022-09-10

## 2022-09-10 RX ORDER — HYDROXYZINE 50 MG/1
50 TABLET, FILM COATED ORAL
Qty: 20 TABLET | Refills: 0 | Status: SHIPPED | OUTPATIENT
Start: 2022-09-10 | End: 2022-09-20

## 2022-09-10 RX ORDER — HYDROXYZINE 25 MG/1
50 TABLET, FILM COATED ORAL
Status: COMPLETED | OUTPATIENT
Start: 2022-09-10 | End: 2022-09-10

## 2022-09-10 RX ADMIN — HYDROXYZINE HYDROCHLORIDE 50 MG: 25 TABLET, FILM COATED ORAL at 20:54

## 2022-09-11 NOTE — ED TRIAGE NOTES
Pt arrives from home with CC of a rash. Pt had port placed on Thursday due to recent dx of colon cancer. Pt states in the past 48hrs the port site has become increasingly itchy. She states it is spreading to her neck, surrounding chest area, and into her hairline.

## 2022-09-11 NOTE — ED PROVIDER NOTES
47-year-old female with history of asthma, colorectal cancer, T2DM, GERD, and hypothyroidism presents to ED with concerns for rash. Patient reports that she had a port placed 3 days ago (2022) for chemo for recent diagnosis of colorectal cancer. She notes that since then she has noticed localized itching and an erythematous, papular rash to the area around the port site, radiating into her neck and her R breast. She denies any involvement of her back, abdomen, or extremities. She tried taking antihistamines (OTC, unsure of what kind) with little relief. She has not tried any ointments or creams. She talked to Dr. Bassam Allen (surgeon) who recommended she come in to be evaluated. Denies any SOB, wheezing, cough, fevers, chills. She has no history of similar symptoms and denies any other new exposures. The history is provided by the patient.    Rash        Past Medical History:   Diagnosis Date    Asthma     Cancer ([address]     COLON RECTAL CANCER    Diabetes ([address]     Gastrointestinal disorder     GERD    GERD (gastroesophageal reflux disease)     Retinopathy due to secondary diabetes ([address]     Seizures ([address]     Thyroid disease     HYPERTHYROIDISM       Past Surgical History:   Procedure Laterality Date    COLONOSCOPY N/A 2022    COLONOSCOPY performed by Anh Montes MD at [facility] ENDOSCOPY    HX APPENDECTOMY       [address]  2012    HX  SECTION  2015    HX CHOLECYSTECTOMY  2013    HX GI      HX HEENT Bilateral     EYE LID SURGERY & SECOND SURGERY ON RIGHT LID    HX HYSTERECTOMY  2022    TUMOR/MASS REMOVED & APPENDEX    HX LIPOMA RESECTION  2010    HX ORTHOPAEDIC Right     Carpul jesus manuel    HX OTHER SURGICAL Right     INJECTION RIGHT EYE FOR DIABETIC RETINOPATHY         Family History:   Problem Relation Age of Onset    Thyroid Disease Mother     Myasthenia Gravis Mother     Heart Disease Father     Cancer Father 61        colon or rectal cancer    Alcohol abuse Father No Known Problems Sister     Cancer Paternal Aunt         Breast cancer    Arthritis-rheumatoid Paternal Aunt     Breast Cancer Paternal Aunt     Cancer Paternal Aunt         breast cancer    Breast Cancer Paternal Aunt     Breast Cancer Paternal Grandmother     Breast Cancer Other         great paternal aunt    Cancer Other         paternal aunt, ovarian cancer    Anesth Problems Neg Hx        Social History     Socioeconomic History    Marital status:      Spouse name: Not on file    Number of children: Not on file    Years of education: Not on file    Highest education level: Not on file   Occupational History     Employer: self employed   Tobacco Use    Smoking status: Never    Smokeless tobacco: Never   Vaping Use    Vaping Use: Never used   Substance and Sexual Activity    Alcohol use: No     Alcohol/week: 0.0 standard drinks    Drug use: No    Sexual activity: Yes     Partners: Male     Birth control/protection: Condom     Comment:  has 2 children   Other Topics Concern    Not on file   Social History Narrative    Not on file     Social Determinants of Health     Financial Resource Strain: Not on file   Food Insecurity: Not on file   Transportation Needs: Not on file   Physical Activity: Not on file   Stress: Not on file   Social Connections: Not on file   Intimate Partner Violence: Not on file   Housing Stability: Not on file         ALLERGIES: Aspirin, Eliquis [apixaban], Orange, Sulfa (sulfonamide antibiotics), and Sudafed [pseudoephedrine hcl]    Review of Systems   Constitutional:  Negative for fever. HENT:  Negative for congestion and sinus pressure. Respiratory:  Negative for shortness of breath. Cardiovascular:  Negative for chest pain. Gastrointestinal:  Negative for nausea and vomiting. Genitourinary:  Negative for dysuria. Musculoskeletal:  Negative for myalgias. Skin:  Positive for rash. Neurological:  Negative for dizziness and headaches.    Hematological:  Negative for adenopathy. Psychiatric/Behavioral:  The patient is not nervous/anxious. All other systems reviewed and are negative. Vitals:    09/10/22 2033   BP: (!) 143/84   Pulse: 84   Resp: 16   Temp: 98.4 °F (36.9 °C)   SpO2: 98%            Physical Exam  Vitals and nursing note reviewed. Constitutional:       General: She is not in acute distress. Appearance: Normal appearance. She is normal weight. HENT:      Head: Normocephalic and atraumatic. Eyes:      Extraocular Movements: Extraocular movements intact. Pupils: Pupils are equal, round, and reactive to light. Cardiovascular:      Rate and Rhythm: Normal rate and regular rhythm. Heart sounds: Normal heart sounds. Pulmonary:      Breath sounds: Normal breath sounds. Abdominal:      Palpations: Abdomen is soft. Tenderness: There is no abdominal tenderness. Lymphadenopathy:      Cervical: No cervical adenopathy. Skin:     General: Skin is warm and dry. Findings: Rash present. Rash is papular (erythematous papular rash noted to R chest surrounding new port site radiating into R neck and on R breast. Rest of trunk and extremities spared. ). Comments: Surgical incision site well-healing without surrounding erythema, edema, drainage or signs of dehissence   Neurological:      General: No focal deficit present. Mental Status: She is alert and oriented to person, place, and time. Psychiatric:         Mood and Affect: Mood normal.         Behavior: Behavior normal.         Thought Content: Thought content normal.        MDM  Number of Diagnoses or Management Options  Allergic dermatitis  Diagnosis management comments: 49-year-old female with history of asthma, colorectal cancer, T2DM, GERD, and hypothyroidism presents to ED with concerns for rash. V/s stable in triage and patient afebrile.  PE notable for erythematous papular rash noted to R chest surrounding new port site radiating into R neck and on R breast. Rest of trunk and extremities spared. Also incision site well-healing without surrounding erythema, edema, drainage or signs of dehiscence. Patient received 50 mg of PO atarax in ED with improvement of pruritis. No indication for IV or PO steroids at this time as patient's rash is limited to localized area and no systemic symptoms of SOB, cough, wheezing. Patient will be discharged with Rx for steroid cream, atarax as well as instructions for conservative care, follow up and return precautions.            Procedures

## 2022-09-12 ENCOUNTER — OFFICE VISIT (OUTPATIENT)
Dept: SURGERY | Age: 42
End: 2022-09-12
Payer: MEDICAID

## 2022-09-12 VITALS
HEIGHT: 62 IN | HEART RATE: 104 BPM | BODY MASS INDEX: 37.17 KG/M2 | DIASTOLIC BLOOD PRESSURE: 86 MMHG | SYSTOLIC BLOOD PRESSURE: 126 MMHG | WEIGHT: 202 LBS | RESPIRATION RATE: 18 BRPM | TEMPERATURE: 98.1 F | OXYGEN SATURATION: 96 %

## 2022-09-12 DIAGNOSIS — Z09 POSTOPERATIVE EXAMINATION: Primary | ICD-10-CM

## 2022-09-12 DIAGNOSIS — L25.9 CONTACT DERMATITIS, UNSPECIFIED CONTACT DERMATITIS TYPE, UNSPECIFIED TRIGGER: ICD-10-CM

## 2022-09-12 PROCEDURE — 99024 POSTOP FOLLOW-UP VISIT: CPT

## 2022-09-12 RX ORDER — METHYLPREDNISOLONE 4 MG/1
4 TABLET ORAL
Qty: 1 DOSE PACK | Refills: 0 | Status: SHIPPED | OUTPATIENT
Start: 2022-09-12

## 2022-09-12 NOTE — PROGRESS NOTES
CC: Post Operative state    Subjective:     Baron Cole is a 35 y.o. female presents for postop care 4 days s/p Port-A-Cath insertion for treatment of stage IV rectal cancer. Pt presents with concerns for post op rash. She reports that since her port placement she has noticed localized itching and an erythematous, papular rash to the area around the port site, radiating into her neck and her R breast.  She tried taking OTC antihistamines (benadryl) with minimal relief. She called the office with rash symptoms and was instructed to go to the ED on Saturday to have rash evaluated. She denies any use of any new creams,  lotions, or other new exposures. She was given a prescription for atarax and kenalog cream in ED and was discharged home. She denies any pain or fevers/chills. Denies any SOB, wheezing, or difficulty breathing. Review of Systems:  A comprehensive review of systems was negative except for that written above      Ms. Sarika Coronado has a reminder for a \"due or due soon\" health maintenance. I have asked that she contact her primary care provider for follow-up on this health maintenance. Objective:     Visit Vitals  /86 (BP 1 Location: Left upper arm, BP Patient Position: Sitting, BP Cuff Size: Large adult)   Pulse (!) 104 Comment: Provider Notified   Temp 98.1 °F (36.7 °C) (Oral)   Resp 18   Ht 5' 2\" (1.575 m)   Wt 202 lb (91.6 kg)   LMP 07/14/2022 (Approximate)   SpO2 96%   BMI 36.95 kg/m²       General: alert, cooperative, no distress, appears stated age  CV:S2S1; regular rhythm  Pulmonary: Lungs clear to auscultation; unlabored, no SOB  Right chest Incision:   papular rash noted to R chest surrounding new port site radiating into R neck, rt arm and on R breast, all areas slightly erythematous;  incision well approximated; no drainage, no induration, no signs of infection    Assessment:       ICD-10-CM ICD-9-CM    1. Postoperative examination  Z09 V67.00       2.  Contact dermatitis, unspecified contact dermatitis type, unspecified trigger  L25.9 692.9           Plan:   4 days s/p Port-A-Cath insertion for treatment of stage IV rectal cancer. Medrol dose pack ordered (6  day taper) for contact dermatitis likely due to surgical prep since rash extends to neck, rt arm, and rt breast.  Continue to appt with oncologist for treatment of rectal ca tomorrow as well as scheduled MRI. Jose R Montoya verbalized understanding and questions were answered to the best of my knowledge and ability. Instructed patient to call with any worsening symptoms or with any questions or concerns. Instructed to call 911 or go to emergency room for any difficultly breathing, SOB, or chest pain.    Follow up in 1 week or sooner if needed        > 18 minutes were spent with patient with greater than 50% of that time spent face to face counseling    Rodrigo Phelan NP  [facility]   9/12/2022

## 2022-09-12 NOTE — PROGRESS NOTES
1. Have you been to the ER, urgent care clinic since your last visit? Hospitalized since your last visit? Yes [facility] er, for itching and reaction at port site    2. Have you seen or consulted any other health care providers outside of the [address] [place] [address] since your last visit? Include any pap smears or colon screening.  No

## 2022-09-13 ENCOUNTER — DOCUMENTATION ONLY (OUTPATIENT)
Dept: ONCOLOGY | Age: 42
End: 2022-09-13

## 2022-09-13 ENCOUNTER — OFFICE VISIT (OUTPATIENT)
Dept: ONCOLOGY | Age: 42
End: 2022-09-13
Payer: MEDICAID

## 2022-09-13 ENCOUNTER — HOSPITAL ENCOUNTER (OUTPATIENT)
Dept: MRI IMAGING | Age: 42
Discharge: HOME | End: 2022-09-13
Attending: SURGERY
Payer: MEDICAID

## 2022-09-13 VITALS
HEART RATE: 94 BPM | HEIGHT: 62 IN | OXYGEN SATURATION: 98 % | TEMPERATURE: 97.5 F | WEIGHT: 198.8 LBS | BODY MASS INDEX: 36.58 KG/M2 | SYSTOLIC BLOOD PRESSURE: 135 MMHG | DIASTOLIC BLOOD PRESSURE: 75 MMHG

## 2022-09-13 DIAGNOSIS — C20 RECTAL CANCER METASTASIZED TO PELVIS (HCC): Primary | ICD-10-CM

## 2022-09-13 DIAGNOSIS — E10.8 TYPE 1 DIABETES MELLITUS WITH COMPLICATIONS (HCC): ICD-10-CM

## 2022-09-13 DIAGNOSIS — C20 RECTAL ADENOCARCINOMA (HCC): ICD-10-CM

## 2022-09-13 DIAGNOSIS — C79.89 RECTAL CANCER METASTASIZED TO PELVIS (HCC): Primary | ICD-10-CM

## 2022-09-13 PROCEDURE — 74011250636 HC RX REV CODE- 250/636

## 2022-09-13 PROCEDURE — A9576 INJ PROHANCE MULTIPACK: HCPCS

## 2022-09-13 PROCEDURE — 99215 OFFICE O/P EST HI 40 MIN: CPT | Performed by: INTERNAL MEDICINE

## 2022-09-13 PROCEDURE — 72197 MRI PELVIS W/O & W/DYE: CPT

## 2022-09-13 RX ORDER — LIDOCAINE AND PRILOCAINE 25; 25 MG/G; MG/G
CREAM TOPICAL
Qty: 30 G | Refills: 0 | Status: SHIPPED | OUTPATIENT
Start: 2022-09-13

## 2022-09-13 RX ORDER — DEXAMETHASONE 4 MG/1
TABLET ORAL
Qty: 60 TABLET | Refills: 2 | Status: SHIPPED | OUTPATIENT
Start: 2022-09-13

## 2022-09-13 RX ORDER — PROCHLORPERAZINE MALEATE 5 MG
TABLET ORAL
Qty: 30 TABLET | Refills: 2 | Status: SHIPPED | OUTPATIENT
Start: 2022-09-13

## 2022-09-13 RX ORDER — ONDANSETRON 4 MG/1
4-8 TABLET, ORALLY DISINTEGRATING ORAL
Qty: 60 TABLET | Refills: 2 | Status: SHIPPED | OUTPATIENT
Start: 2022-09-13

## 2022-09-13 RX ADMIN — GADOTERIDOL 20 ML: 279.3 INJECTION, SOLUTION INTRAVENOUS at 21:26

## 2022-09-13 NOTE — PATIENT INSTRUCTIONS
-  Start medrol dose pack (6 days) for rash    -  Continue with atarax and kenalog cream as prescribed    -  Call with any worsening symptoms or with any questions or concerns    -  Go to oncology appt tomorrow    -  Go to MRI tomorrow. Do not use port for MRI    -  Call 911 or go to emergency room for any difficultly breathing, SOB, or chest pain.      -  Follow up in 1 week or sooner if needed

## 2022-09-13 NOTE — PROGRESS NOTES
Richardson Fuentes is a 35 y.o. female  Chief Complaint   Patient presents with    Follow-up     rectal cancer     1. Have you been to the ER, urgent care clinic since your last visit? Hospitalized since your last visit? Yes, patient states she had allergic reacting to port being put in.    2. Have you seen or consulted any other health care providers outside of the [address] [place] [address] since your last visit? Include any pap smears or colon screening.  No

## 2022-09-13 NOTE — LETTER
9/13/2022 10:35 AM    Patient:  Ankush Baker   YOB: 1980  Date of Visit: 9/13/2022      Dear   No Recipients: Thank you for referring Ms. Randy Lagos to me for evaluation/treatment. Below are the relevant portions of my assessment and plan of care. If you have questions, please do not hesitate to call me. I look forward to following Ms. Jenkins along with you.         Sincerely,      Kate Dandy, DO

## 2022-09-13 NOTE — PROGRESS NOTES
Can[facility] [place] at [address], [id number] [place] [address] Road, [address] [place] [address] [place]  W: [phone]  F: [phone]    Reason for Visit:   Ana María Feldman is a 35 y.o. female who is seen in office fu for rectal cancer    Treatment History:   22  Procedure(s): EXPLORATORY LAPAROTOMY, RESECTION OF MASS, TOTAL ABDOMINAL HYSTEROSCOPY, BILATERAL SALPINGOOOPHORECTOMY, OMENTECTOMY, APPENDECTOMY, PROCTOSCOPY    STAGE: 4 with pelvic disease    History of Present Illness:     Pt seen today in office for fu metastatic rectal cancer. Here for chemo set up. Feels ok.    No fevers/ chills/ chest pain/ SOB/ nausea/ vomiting/diarrhea/ pain/fatigue  CEA 68      Past Medical History:   Diagnosis Date    Asthma     Cancer ([address]     COLON RECTAL CANCER    Diabetes ([address]     Gastrointestinal disorder     GERD    GERD (gastroesophageal reflux disease)     Retinopathy due to secondary diabetes ([address]     Seizures ([address]     Thyroid disease     HYPERTHYROIDISM      Past Surgical History:   Procedure Laterality Date    COLONOSCOPY N/A 2022    COLONOSCOPY performed by Joanne Moe MD at [facility] ENDOSCOPY    HX APPENDECTOMY      HX  SECTION  2012    HX  SECTION  2015    HX CHOLECYSTECTOMY  2013    HX GI      HX HEENT Bilateral     EYE LID SURGERY & SECOND SURGERY ON RIGHT LID    HX HYSTERECTOMY  2022    TUMOR/MASS REMOVED & APPENDEX    HX LIPOMA RESECTION  2010    HX ORTHOPAEDIC Right     Carpul jesus manuel    HX OTHER SURGICAL Right     INJECTION RIGHT EYE FOR DIABETIC RETINOPATHY      Social History     Tobacco Use    Smoking status: Never    Smokeless tobacco: Never   Substance Use Topics    Alcohol use: No     Alcohol/week: 0.0 standard drinks      Family History   Problem Relation Age of Onset    Thyroid Disease Mother     Myasthenia Gravis Mother     Heart Disease Father     Cancer Father 61        colon or rectal cancer    Alcohol abuse Father     No Known Problems Sister Cancer Paternal Aunt         Breast cancer    Arthritis-rheumatoid Paternal Aunt     Breast Cancer Paternal Aunt     Cancer Paternal Aunt         breast cancer    Breast Cancer Paternal Aunt     Breast Cancer Paternal Grandmother     Breast Cancer Other         great paternal aunt    Cancer Other         paternal aunt, ovarian cancer    Anesth Problems Neg Hx      Current Outpatient Medications   Medication Sig    methylPREDNISolone (MEDROL DOSEPACK) 4 mg tablet Take 1 Tablet by mouth Specific Days and Specific Times. hydrOXYzine HCL (ATARAX) 50 mg tablet Take 1 Tablet by mouth every six (6) hours as needed for Itching for up to 10 days. triamcinolone acetonide (KENALOG) 0.1 % topical cream Apply  to affected area two (2) times a day. use thin layer    methIMAzole (TAPAZOLE) 10 mg tablet Take 10 mg by mouth every morning. FIASP FLEXTOUCH U-100 INSULIN 100 unit/mL (3 mL) inpn 10 Units by SubCUTAneous route three (3) times daily (with meals). 10-20 UNITS, 3 TIMES DAILY WITH MEALS AS NEEDED    Magnesium Oxide 500 mg cap Take 1 Capsule by mouth every morning. pantoprazole (PROTONIX) 40 mg tablet take 1 tablet by mouth once daily (Patient taking differently: Take 40 mg by mouth every morning. take 1 tablet by mouth once daily)    albuterol (PROVENTIL HFA, VENTOLIN HFA, PROAIR HFA) 90 mcg/actuation inhaler Take 2 Puffs by inhalation every four (4) hours as needed for Wheezing. TOUJEO SOLOSTAR 300 unit/mL (1.5 mL) inpn 46 Units by SubCUTAneous route daily (with lunch). NOVOFINE 30 30 gauge x 1/3\"     ONETOUCH VERIO strip     PNV #08/FMQLBFI FUM/FOLIC ACID (PRENATAL MULTIVIT WITH IRON PO) Take 1 Tablet by mouth every morning. No current facility-administered medications for this visit.       Allergies   Allergen Reactions    Aspirin Itching    Eliquis [Apixaban] Itching and Swelling    Orange Swelling    Sulfa (Sulfonamide Antibiotics) Nausea and Vomiting    Sudafed [Pseudoephedrine Hcl] Other (comments)     Severe head ache        Review of Systems: A complete review of systems was obtained, negative except as described above and as reported on ROS sheet scanned into system. Physical Exam:   Visit Vitals  /75 (BP 1 Location: Left upper arm, BP Patient Position: Sitting) Comment: waiting   Pulse 94   Temp 97.5 °F (36.4 °C) (Temporal)   Ht 5' 2\" (1.575 m)   Wt 198 lb 12.8 oz (90.2 kg)   LMP 07/14/2022 (Approximate)   SpO2 98%   BMI 36.36 kg/m²     ECOG PS:  1  General: No distress  Eyes: anicteric sclerae  HENT: Atraumatic  Neck: Supple  Respiratory: normal respiratory effort  MS: ambulatory  Skin: No rashes, ecchymoses, or petechiae. Normal temperature, turgor, and texture. Psych: Alert, conversant appropriately  Port site ok with chest slight pink rash    Results:     Lab Results   Component Value Date/Time    WBC 5.4 08/12/2022 04:30 PM    HGB 10.2 (L) 08/12/2022 04:30 PM    HCT 32.1 (L) 08/12/2022 04:30 PM    PLATELET 009 28/03/6980 04:30 PM    MCV 87.2 08/12/2022 04:30 PM    ABS. NEUTROPHILS 3.0 08/12/2022 04:30 PM     Lab Results   Component Value Date/Time    Sodium 139 08/12/2022 04:30 PM    Potassium 3.6 08/12/2022 04:30 PM    Chloride 104 08/12/2022 04:30 PM    CO2 29 08/12/2022 04:30 PM    Glucose 120 (H) 08/12/2022 04:30 PM    BUN 8 08/12/2022 04:30 PM    Creatinine 0.54 (L) 08/12/2022 04:30 PM    GFR est AA >60 08/12/2022 04:30 PM    GFR est non-AA >60 08/12/2022 04:30 PM    Calcium 9.5 08/12/2022 04:30 PM    Glucose (POC) 136 (H) 09/08/2022 09:03 AM     Lab Results   Component Value Date/Time    Bilirubin, total 0.2 08/12/2022 04:30 PM    ALT (SGPT) 22 08/12/2022 04:30 PM    Alk.  phosphatase 97 08/12/2022 04:30 PM    Protein, total 7.0 08/12/2022 04:30 PM    Albumin 3.5 08/12/2022 04:30 PM    Globulin 3.5 08/12/2022 04:30 PM       CT Results (most recent):  Results from Hospital Encounter encounter on 09/07/22    CT ABD PELV W CONT    Narrative  Clinical history: Rectal cancer  INDICATION:   Rectal cancer  COMPARISON:  7/27/2022  CONTRAST: 100ml Isovue-370  TECHNIQUE:  Following the uneventful intravenous administration of Isovue-370, thin axial  images were obtained through the chest, abdomen and pelvis. Coronal and sagittal  reconstructions were generated. The patient  was  administered oral contrast material as well. CT dose reduction was achieved through use of a standardized protocol tailored  for this examination and automatic exposure control for dose modulation. Adaptive statistical iterative reconstruction (ASIR) was utilized. FINDINGS:    SUPRACLAVICULAR REGION: Major cervical vasculature within normal limits. No  supraclavicular adenopathy. VASCULATURE:  No aortic aneurysm or dissection. No proximal pulmonary embolism is identified. MEDIASTINUM: No mass or lymphadenopathy. No hilar or mediastinal  lymphadenopathy. No esophageal mass. No endotracheal or endobronchial mass. PLEURA/LUNGS[de-identified] No effusion or pneumothorax. No nodule, mass, or airspace  disease. SOFT TISSUE/ AXILLA:  No mass or lymphadenopathy. LIVER: There is hepatic steatosis. Postcholecystectomy. There is no intrahepatic  duct dilatation. There is no hepatic parenchymal mass. Hepatic enhancement  pattern is within normal limits. Portal vein is patent. GALLBLADDER:  Absent  SPLEEN/PANCREAS: No mass. There is no pancreatic duct dilatation. There is mild  splenomegaly. ADRENALS/KIDNEYS: No mass. There is no hydronephrosis. There is no renal mass. There is no perinephric mass. STOMACH: No dilatation or wall thickening. COLON AND SMALL BOWEL: Status post resection of large pelvic mass. There is  minimal sigmoid colonic wall thickening which abuts loculated fluid in the deep  pelvis. There is minimal loculated fluid present in the deep pelvis this  measures 50 x 42 mm in size at image 3-104 There is no free intraperitoneal air. There is no evidence of incarceration or obstruction.  No mesenteric adenopathy. PERITONEUM: Unremarkable. APPENDIX: Unremarkable. BLADDER/REPRODUCTIVE ORGANS: Status post hysterectomy. The bladder is  unremarkable. .Status post resection of large pelvic mass. There is minimal  loculated fluid present in the deep pelvis this measures 50 x 42 mm in size at  image 3-104    Nonspecific soft tissue density nodule at 3-97 on the left 13 x 17 mm in size  was present on the previous examination. RETROPERITONEUM: Unremarkable. The abdominal aorta is normal in caliber. No  aneurysm. No retroperitoneal adenopathy. OSSEOUS STRUCTURES: No destructive bone lesion. Impression  Patient is status post resection of large cystic pelvic mass. Minimal loculated  fluid present in the deep pelvis measuring 50 x 42 x 39 mm in maximal cranial  caudal dimension. Postprocedural seroma versus contained leak versus small  abscess. Loculated collection is not easily amenable to percutaneous drainage. There is  associated mild thickening of sigmoid colon. Status post hysterectomy. Splenomegaly. No acute intraperitoneal/intrathoracic process is identified. Please see above for additional nonemergent incidental findings. No definite evidence of metastatic disease in the chest, abdomen or pelvis. Records reviewed and summarized above. Pathology report(s) reviewed above. Radiology report(s) reviewed above. Assessment/PLAN:     1) stage 4 rectal cancer RUSS with mets to pelvis  Found at GYN/ONC surgery. 8/1/22  Procedure(s): EXPLORATORY LAPAROTOMY, RESECTION OF MASS, TOTAL ABDOMINAL HYSTEROSCOPY, BILATERAL SALPINGOOOPHORECTOMY, OMENTECTOMY, APPENDECTOMY, PROCTOSCOPY    Seen today for fu office visit. Recovered well post surgery. Had GI eval and has rectal mass and biopsy + for adenocarcinoma. Waiting for molecular markers still. RUSS. Fu CTs good except for post surgery changes. Reviewed with GYN/ONC and pt/  today. Discussed palliative chemo today.    Discussed option of FOLFIRINOX vs FOLFOX chemo. Pt is open to all treatment options. Pt is concerned about T1DM and management of blood sugars. We discussed the risks and benefits of FOLFIRINOX chemotherapy, including potential side effects. These include but are not limited to fatigue, nausea vomiting, diarrhea, neuropathy, taste changes, cold intolerance, esophageal spasm, allergic reactions, alopecia, mucositis, myelosuppression, risk for infection, infertility, and rarely, death. Rarely, a patient may have a condition where they do not metabolize fluorouracil appropriately (called DPD deficiency), and they may have excessive toxicity. A Port-A-Cath will be required in order to deliver the continuous infusion. The patient has consented to beginning therapy. Pt is agreeable to chemo. Labs stable. CEA 68. Got port and has skin reaction at site and on steroids. Seeing surgery for this. Clinically doing great today. Follow up at chemo / pt wants 9/27. Will do 2-4 months of chemo with CT re eval.     2) abdominal pain. Controlled. Monitor. 3) reported colon cancer in Father. Aunts breast cancer  Wants to do genetic testing. Consider genetic counseling     4) asthma/ GERD/ thyroid. Per IM. 5) DM Type 1. Per endocrine. Seeing them this thurs. Will monitor closely on chemo. 6) port site reaction. Seeing surgery for this. On steroids for this. 7) not vaccinated by choice. Understands higher risk of infection during chemo. 8) psychosocial. Mood good. Coping well.  here today. Has two young children. Pt has good support. Fu here 9/27/22  Call if questions    I appreciate the opportunity to participate in Ms. Kayli Jenkins's care.     Signed By: Jb Van DO

## 2022-09-13 NOTE — PROGRESS NOTES
Palliative mFOLFIRINOX ordered in [facility] to start on 9/27/22. Anti-emetics, Decadron and EMLA sent to pharmacy on file.

## 2022-09-13 NOTE — LETTER
9/13/2022    Patient: Saulo Cagle   YOB: 1980   Date of Visit: 9/13/2022     Shayan Upton NP  [address] [facility] [id number]  Via Fax: [phone]    Dear Shayan Upton NP,      Thank you for referring Ms. [place] Camacho to [address] [place] [address] for evaluation. My notes for this consultation are attached. If you have questions, please do not hesitate to call me. I look forward to following your patient along with you.       Sincerely,    Justin Tellez, DO

## 2022-09-13 NOTE — PROGRESS NOTES
Pharmacy Note- Chemotherapy Education    Yesenia Contreras is a  35 y. o.female  diagnosed with rectal cancer here today for chemotherapy counseling and consent. Ms. Faviola Fatima is being treated with mFOLFIRINOX. Provided education on fluorouracil, oxaliplatin, irinotecan, leucovorin and premedications - fosaprepitant, palonosetron and dexamethasone. Side effects of chemotherapy reviewed included s/s infection, anemia, appetite changes, thrombocytopenia, fatigue, hair loss/alopecia, bone pain, skin and nail changes, diarrhea/constipation and nerve sensitivities (cold sensitivity and peripheral neuropathy). Patient given ways to manage these side effects and when to contact office. [address] [facility] Handout of medications provided to patient. Ms. Faviola Fatima verbalized understanding of the information presented and all of the patient's questions were answered.     Myron Clark, PharmD, [facility], [address] [place] [address] in place: No  Recommendation Provided To: Patient/Caregiver: 1 via In person    Intervention Accepted By: Patient/Caregiver: 1  Time Spent (min): 30

## 2022-09-15 ENCOUNTER — TELEPHONE (OUTPATIENT)
Dept: SURGERY | Age: 42
End: 2022-09-15

## 2022-09-15 NOTE — TELEPHONE ENCOUNTER
Attempted to call the patient about needing to reschedule her appointment on  9/20 with Orquidea Gonzalez. This appointment needs to be rescheduled to 9/19 with Robby Holland at any time.

## 2022-09-16 RX ORDER — SODIUM CHLORIDE 0.9 % (FLUSH) 0.9 %
5-40 SYRINGE (ML) INJECTION AS NEEDED
OUTPATIENT
Start: 2022-09-27

## 2022-09-16 RX ORDER — DIPHENHYDRAMINE HYDROCHLORIDE 50 MG/ML
25 INJECTION, SOLUTION INTRAMUSCULAR; INTRAVENOUS AS NEEDED
Start: 2022-09-27

## 2022-09-16 RX ORDER — SODIUM CHLORIDE 9 MG/ML
5-250 INJECTION, SOLUTION INTRAVENOUS AS NEEDED
OUTPATIENT
Start: 2022-09-29

## 2022-09-16 RX ORDER — SODIUM CHLORIDE 9 MG/ML
5-40 INJECTION INTRAMUSCULAR; INTRAVENOUS; SUBCUTANEOUS AS NEEDED
OUTPATIENT
Start: 2022-09-27

## 2022-09-16 RX ORDER — HEPARIN 100 UNIT/ML
500 SYRINGE INTRAVENOUS AS NEEDED
Start: 2022-09-27

## 2022-09-16 RX ORDER — DIPHENHYDRAMINE HYDROCHLORIDE 50 MG/ML
50 INJECTION, SOLUTION INTRAMUSCULAR; INTRAVENOUS AS NEEDED
Start: 2022-09-27

## 2022-09-16 RX ORDER — ATROPINE SULFATE 0.4 MG/ML
0.4 INJECTION, SOLUTION ENDOTRACHEAL; INTRAMEDULLARY; INTRAMUSCULAR; INTRAVENOUS; SUBCUTANEOUS
OUTPATIENT
Start: 2022-09-27

## 2022-09-16 RX ORDER — ALBUTEROL SULFATE 0.83 MG/ML
2.5 SOLUTION RESPIRATORY (INHALATION) AS NEEDED
Start: 2022-09-27

## 2022-09-16 RX ORDER — SODIUM CHLORIDE 9 MG/ML
5-250 INJECTION, SOLUTION INTRAVENOUS AS NEEDED
OUTPATIENT
Start: 2022-09-27

## 2022-09-16 RX ORDER — ACETAMINOPHEN 325 MG/1
650 TABLET ORAL AS NEEDED
Start: 2022-09-27

## 2022-09-16 RX ORDER — HYDROCORTISONE SODIUM SUCCINATE 100 MG/2ML
100 INJECTION, POWDER, FOR SOLUTION INTRAMUSCULAR; INTRAVENOUS AS NEEDED
OUTPATIENT
Start: 2022-09-27

## 2022-09-16 RX ORDER — DEXTROSE MONOHYDRATE 50 MG/ML
5-250 INJECTION, SOLUTION INTRAVENOUS AS NEEDED
OUTPATIENT
Start: 2022-09-27

## 2022-09-16 RX ORDER — SODIUM CHLORIDE 9 MG/ML
5-40 INJECTION INTRAMUSCULAR; INTRAVENOUS; SUBCUTANEOUS AS NEEDED
OUTPATIENT
Start: 2022-09-29

## 2022-09-16 RX ORDER — ONDANSETRON 2 MG/ML
8 INJECTION INTRAMUSCULAR; INTRAVENOUS AS NEEDED
OUTPATIENT
Start: 2022-09-27

## 2022-09-16 RX ORDER — HEPARIN 100 UNIT/ML
500 SYRINGE INTRAVENOUS AS NEEDED
Start: 2022-09-29

## 2022-09-16 RX ORDER — PALONOSETRON 0.05 MG/ML
0.25 INJECTION, SOLUTION INTRAVENOUS ONCE
OUTPATIENT
Start: 2022-09-27 | End: 2022-09-27

## 2022-09-16 RX ORDER — SODIUM CHLORIDE 0.9 % (FLUSH) 0.9 %
5-40 SYRINGE (ML) INJECTION AS NEEDED
OUTPATIENT
Start: 2022-09-29

## 2022-09-16 RX ORDER — EPINEPHRINE 1 MG/ML
0.3 INJECTION, SOLUTION, CONCENTRATE INTRAVENOUS AS NEEDED
OUTPATIENT
Start: 2022-09-27

## 2022-09-19 ENCOUNTER — OFFICE VISIT (OUTPATIENT)
Dept: SURGERY | Age: 42
End: 2022-09-19
Payer: MEDICAID

## 2022-09-19 VITALS
RESPIRATION RATE: 18 BRPM | WEIGHT: 199.4 LBS | HEIGHT: 62 IN | OXYGEN SATURATION: 98 % | DIASTOLIC BLOOD PRESSURE: 84 MMHG | SYSTOLIC BLOOD PRESSURE: 137 MMHG | BODY MASS INDEX: 36.7 KG/M2 | HEART RATE: 79 BPM

## 2022-09-19 DIAGNOSIS — M62.838 MUSCLE SPASMS OF NECK: ICD-10-CM

## 2022-09-19 DIAGNOSIS — G89.18 POST-OPERATIVE PAIN: ICD-10-CM

## 2022-09-19 DIAGNOSIS — Z09 POSTOPERATIVE EXAMINATION: Primary | ICD-10-CM

## 2022-09-19 PROCEDURE — 99024 POSTOP FOLLOW-UP VISIT: CPT

## 2022-09-19 RX ORDER — CYCLOBENZAPRINE HCL 10 MG
10 TABLET ORAL
Qty: 15 TABLET | Refills: 0 | Status: SHIPPED | OUTPATIENT
Start: 2022-09-19

## 2022-09-19 NOTE — PROGRESS NOTES
CC: Post Operative state    Subjective:     Anabel Tee is a 35 y.o. female presents for postop care 11 days s/p Port-A-Cath insertion for treatment of stage IV rectal cancer. Pt was last seen in office 9/13/22 with concerns for post op rash. Reports she completed prescribed medrol dose pack yesterday and has not taken atarax for 24 hours now. She still has some itching at the port site, but no rash present. She c/o rt intermittent rt neck pain that radiates to rt shoulder. She is not currently taking any pain medication and does not like taking narcotics. She reports she believes she has an allergy to titanium because she had a reaction to a titanium ring in the past and is concerned that she may need to have port a cath removed. She states she plans on getting a second opinion from another oncologist for treatment of her rectal ca. Patient denies fever, chest pain, or shortness of breath. Review of Systems:  A comprehensive review of systems was negative except for that written above      Ms. Melanie Renee has a reminder for a \"due or due soon\" health maintenance. I have asked that she contact her primary care provider for follow-up on this health maintenance. Objective:     Visit Vitals  /84 (BP 1 Location: Right upper arm, BP Patient Position: Sitting, BP Cuff Size: Adult)   Pulse 79   Resp 18   Ht 5' 2\" (1.575 m)   Wt 199 lb 6.4 oz (90.4 kg)   LMP 07/14/2022 (Approximate)   SpO2 98%   BMI 36.47 kg/m²       General: alert, cooperative, no distress, appears stated age  CV: Regular rate and rhythm  Pulmonary: Lungs clear to auscultation   Incision to chest:  healing well, no drainage, no erythema or induration, no swelling, no dehiscence, incision well approximated. No rash present     Assessment:       ICD-10-CM ICD-9-CM    1. Postoperative examination  Z09 V67.00       2. Muscle spasms of neck  M62.838 728.85 cyclobenzaprine (FLEXERIL) 10 mg tablet      3.  Post-operative pain  G89.18 338.18 cyclobenzaprine (FLEXERIL) 10 mg tablet          Plan:   11 days s/p Port-A-Cath insertion for treatment of stage IV rectal cancer. Wound care discussed. May use hydrocortisone cream 2 times daily for itching at port site. Instructed to take atarax and call if rash returns. Flexeril prescribed for rt neck pain/ muscle spasms. Discussed patient with Dr. Teddy Means. [facility] verbalized understanding and questions were answered to the best of my knowledge and ability. Instructed patient to call with any worsening symptoms or with any questions or concerns. Instructed to call 911 or go to emergency room for any difficultly breathing, SOB, or chest pain. Follow up in 2 weeks or sooner if needed.         > 15 minutes were spent with patient with greater than 50% of that time spent face to face counseling    Orvel Most, NP  [facility]   9/20/2022

## 2022-09-19 NOTE — PROGRESS NOTES
Chief Complaint   Patient presents with    Follow-up     Pt c/o persistent radiating pain from port site to neck and shoulder    1. Have you been to the ER, urgent care clinic since your last visit? Hospitalized since your last visit? No    2. Have you seen or consulted any other health care providers outside of the [address] [place] [address] since your last visit? Include any pap smears or colon screening.  No

## 2022-09-19 NOTE — PATIENT INSTRUCTIONS
-  Call with any worsening symptoms or with any questions or concerns     -  Continue with oncology appts    -  Take flexeril as prescribed for neck pain /muscle spasms     -  May apply hydrocortisone cream to sx site two times a day. Call if rash returns and take prescribed atarax. -  Call 911 or go to emergency room for any difficultly breathing, SOB, or chest pain.      -  follow up in 2 weeks for wound check and to make sure pain is well controlled and rash does not return

## 2022-09-21 ENCOUNTER — DOCUMENTATION ONLY (OUTPATIENT)
Dept: ONCOLOGY | Age: 42
End: 2022-09-21

## 2022-09-21 DIAGNOSIS — C20 RECTAL ADENOCARCINOMA (HCC): Primary | ICD-10-CM

## 2022-09-21 NOTE — PROGRESS NOTES
Discussed with surgery  MRI reviewed  Pt is for systemic chemo to start next week  Surgery wants referral to Rad/onc and will order  Pt will follow up with us at chemo

## 2022-09-21 NOTE — PROGRESS NOTES
UPDATE:    MA has faxed Radiation Oncology Referral over to the Radiation Oncology office fax number: [phone]! Fax confirmation was received back. Referral in progress. ..   Janessa Few

## 2022-09-22 NOTE — PROGRESS NOTES
UPDATE:    MA has re-faxed Radiation Oncology Referral again to their office fax number today 9/22/22.   Stefan Ceja

## 2022-09-23 NOTE — PROGRESS NOTES
UPDATE:    HIPPA Verified. Called patient off mobile phone number listed. Patient was called to fu on Radiation Oncology Referral. Per MA patient was advise per talking to the Rad/Onc office this morning, the patient sounded confused when she was asked to schedule an appt with them and did not know why she was being referred there. MA was told per Rad/Onc office to give them a call back as to what patient wants to do after MA speak with patient due to the [facility] \"holding an appt slot\" for Obtober 14th at 11:00am for patient in case she decided to proceed with this office. MA was advised per patient that as of right now she does not think she is going to proceed with Radiation Oncology Referral due to planning to begin a Trial she will be going on and also states she wants to cancel next infusion appt/ appt because she wants to be more settled with all of the Trial info. Does not want to begin infusion until she has Trial things figured out under her belt. Stated she will call our office back to get rescheduled with Lucy Moura again.     Toni Bryson

## 2022-09-23 NOTE — PROGRESS NOTES
Call to patient, ID verified X 2. Discussed message that patient was proceeding with Clinical Trial at outside facility. Per patient she has met with Team at [facility] and is awaiting a call regarding Trial, was presented with 3 options at appt on 9/22/22. States will not be proceeding with appts on 9/27/22 for OV with Provider and [facility] to begin treatment. Per patient was never contacted to schedule times, etc, and prefers Trial route. Patient understands to contact RN if Trial option is not available in order to have [facility]/OV appts rescheduled. Update to Provider.

## 2022-09-27 ENCOUNTER — HOSPITAL ENCOUNTER (OUTPATIENT)
Dept: INFUSION THERAPY | Age: 42
End: 2022-09-27

## 2022-09-29 ENCOUNTER — APPOINTMENT (OUTPATIENT)
Dept: INFUSION THERAPY | Age: 42
End: 2022-09-29

## 2022-09-30 ENCOUNTER — TELEPHONE (OUTPATIENT)
Dept: SURGERY | Age: 42
End: 2022-09-30

## 2022-10-03 ENCOUNTER — OFFICE VISIT (OUTPATIENT)
Dept: SURGERY | Age: 42
End: 2022-10-03
Payer: MEDICAID

## 2022-10-03 VITALS
WEIGHT: 203.2 LBS | HEART RATE: 83 BPM | HEIGHT: 62 IN | TEMPERATURE: 97.9 F | OXYGEN SATURATION: 97 % | DIASTOLIC BLOOD PRESSURE: 87 MMHG | BODY MASS INDEX: 37.39 KG/M2 | SYSTOLIC BLOOD PRESSURE: 129 MMHG | RESPIRATION RATE: 15 BRPM

## 2022-10-03 DIAGNOSIS — G89.18 POST-OPERATIVE PAIN: Primary | ICD-10-CM

## 2022-10-03 DIAGNOSIS — Z09 POSTOPERATIVE EXAMINATION: ICD-10-CM

## 2022-10-03 PROCEDURE — 99024 POSTOP FOLLOW-UP VISIT: CPT

## 2022-10-03 NOTE — PROGRESS NOTES
CC: Post Operative state    Subjective:     Adela Woods is a 35 y.o. female presents for postop care 3+weeks s/p Port-A-Cath insertion to receive treatment of stage IV rectal cancer. Pt was last seen in office 9/19/22 with concerns for unresolved post op rash. She presents today with concerns for new pain that she describes as constant pressure and tenderness to site with radiating \"sharp shooting pains\" down her right arm. Itching is sometimes still present at the site, but no rash. Pt states that the port has not been used. She was supposed to start chemotherapy last week, but decided not to because she is waiting to hear back about a clinical trial at [facility]. She denies any drainage from site. Patient denies fever, chest pain, or shortness of breath. Review of Systems:  A comprehensive review of systems was negative except for that written above      Ms. Khadra Regalado has a reminder for a \"due or due soon\" health maintenance. I have asked that she contact her primary care provider for follow-up on this health maintenance. Objective:     Visit Vitals  /87 (BP 1 Location: Left upper arm, BP Patient Position: Sitting, BP Cuff Size: Large adult)   Pulse 83   Temp 97.9 °F (36.6 °C) (Oral)   Resp 15   Ht 5' 2\" (1.575 m)   Wt 203 lb 3.2 oz (92.2 kg)   LMP 07/14/2022 (Approximate)   SpO2 97%   BMI 37.17 kg/m²       General: alert, cooperative, no distress, appears stated age  CV: Regular rate and rhythm  Pulmonary: Lungs clear to auscultation; unlabored   Right chest incision:  site tender to palpation, no drainage, no erythema or induration, no swelling, no dehiscence, incision well approximated. No rash. No signs of infection. No swelling to right upper extremity; palpable pulses; Cap refill to RUE <3 sec. Skin to RUE normal color and normal temp. Assessment:       ICD-10-CM ICD-9-CM    1. Post-operative pain  G89.18 338.18       2.  Postoperative examination  Z09 V67.00           Plan: 3+weeks s/p Port-A-Cath insertion to receive treatment of stage IV rectal cancer. Rt upper extremity venous Doppler  ordered to r/o blood clots. Will call patient with results and further plan. Will d/w Dr. Mel Pisano. Kacy Part verbalized understanding and questions were answered to the best of my knowledge and ability. Instructed patient to call with any questions or concerns. Return precautions discussed. Go to ER or call 911 for any symptoms of chest pain, shortness of breath, or difficulty breathing. Patient instructed to follow-up with Dr. Mel Pisano this week.         > 15 minutes were spent with patient with greater than 50% of that time spent face to face counseling    Donovan Sadler NP  [facility]   10/3/2022

## 2022-10-03 NOTE — PROGRESS NOTES
1. Have you been to the ER, urgent care clinic since your last visit? Hospitalized since your last visit? No    2. Have you seen or consulted any other health care providers outside of the [address] [place] [address] since your last visit? Include any pap smears or colon screening.   Yes [facility]

## 2022-10-03 NOTE — PATIENT INSTRUCTIONS
Please have right upper extremity Doppler completed today. We will call you with results. Make a follow up appt with Dr. Shivam Yao this week to discuss continued pain  Continue with oncology appts.

## 2022-10-05 ENCOUNTER — HOSPITAL ENCOUNTER (OUTPATIENT)
Dept: VASCULAR SURGERY | Age: 42
Discharge: HOME | End: 2022-10-05
Payer: MEDICAID

## 2022-10-05 DIAGNOSIS — G89.18 POST-OPERATIVE PAIN: ICD-10-CM

## 2022-10-05 PROCEDURE — 93971 EXTREMITY STUDY: CPT

## 2022-10-10 ENCOUNTER — TELEPHONE (OUTPATIENT)
Dept: SURGERY | Age: 42
End: 2022-10-10

## 2022-10-10 NOTE — TELEPHONE ENCOUNTER
Called patient to discuss recent right upper extremity duplex results as noted below:    No evidence of vein thrombosis in the right upper extremity. Patient states Port-A-Cath site is still very \"tender and itchy\". Patient instructed to make an appointment with Dr. Aleida Kulkarni this week to discuss options. Patient understands and is in agreement.

## 2022-10-10 NOTE — TELEPHONE ENCOUNTER
Returned call to patient and verified using 2 patient identifiers. She states that she missed a called from Natalie DOWNEY. Informed will forward message to her that she was returning the call and have her reach out to her. Pt voiced understandings and states that she can be reached at [phone].

## 2022-10-11 ENCOUNTER — APPOINTMENT (OUTPATIENT)
Dept: INFUSION THERAPY | Age: 42
End: 2022-10-11

## 2022-10-13 ENCOUNTER — APPOINTMENT (OUTPATIENT)
Dept: INFUSION THERAPY | Age: 42
End: 2022-10-13

## 2022-10-25 ENCOUNTER — APPOINTMENT (OUTPATIENT)
Dept: INFUSION THERAPY | Age: 42
End: 2022-10-25

## 2022-10-27 ENCOUNTER — APPOINTMENT (OUTPATIENT)
Dept: INFUSION THERAPY | Age: 42
End: 2022-10-27

## 2022-11-08 ENCOUNTER — APPOINTMENT (OUTPATIENT)
Dept: INFUSION THERAPY | Age: 42
End: 2022-11-08

## 2022-11-10 ENCOUNTER — APPOINTMENT (OUTPATIENT)
Dept: INFUSION THERAPY | Age: 42
End: 2022-11-10

## 2022-11-22 ENCOUNTER — APPOINTMENT (OUTPATIENT)
Dept: INFUSION THERAPY | Age: 42
End: 2022-11-22

## 2022-11-24 ENCOUNTER — APPOINTMENT (OUTPATIENT)
Dept: INFUSION THERAPY | Age: 42
End: 2022-11-24

## 2022-12-06 ENCOUNTER — APPOINTMENT (OUTPATIENT)
Dept: INFUSION THERAPY | Age: 42
End: 2022-12-06

## 2022-12-08 ENCOUNTER — APPOINTMENT (OUTPATIENT)
Dept: INFUSION THERAPY | Age: 42
End: 2022-12-08

## 2022-12-20 ENCOUNTER — APPOINTMENT (OUTPATIENT)
Dept: INFUSION THERAPY | Age: 42
End: 2022-12-20

## 2022-12-22 ENCOUNTER — APPOINTMENT (OUTPATIENT)
Dept: INFUSION THERAPY | Age: 42
End: 2022-12-22

## 2023-01-03 ENCOUNTER — APPOINTMENT (OUTPATIENT)
Dept: INFUSION THERAPY | Age: 43
End: 2023-01-03

## 2023-01-05 ENCOUNTER — APPOINTMENT (OUTPATIENT)
Dept: INFUSION THERAPY | Age: 43
End: 2023-01-05

## (undated) DEVICE — SYR 20ML LL STRL LF --

## (undated) DEVICE — SYR 10ML LUER LOK 1/5ML GRAD --

## (undated) DEVICE — Device

## (undated) DEVICE — SPONGE LAP 18X18IN STRL -- 5/PK

## (undated) DEVICE — DRESSING HYDROFIBER AQUACEL AG ADVANTAGE 3.5X14 IN

## (undated) DEVICE — C-ARM: Brand: UNBRANDED

## (undated) DEVICE — DECANTER BAG 9": Brand: MEDLINE INDUSTRIES, INC.

## (undated) DEVICE — FCPS RAD JAW 4LC 240CM W/NDL -- BX/20 RADIAL JAW 4

## (undated) DEVICE — GLOVE SURG SZ 6 THK91MIL LTX FREE SYN POLYISOPRENE ANTI

## (undated) DEVICE — DRAPE FLD WRM W44XL66IN C6L FOR INTRATEMP SYS THERMABASIN

## (undated) DEVICE — SUTURE VCRL SZ 0 L36IN ABSRB VLT L36MM CT-1 1/2 CIR J346H

## (undated) DEVICE — REM POLYHESIVE ADULT PATIENT RETURN ELECTRODE: Brand: VALLEYLAB

## (undated) DEVICE — SOLUTION IRRIG 1000ML 0.9% SOD CHL USP POUR PLAS BTL

## (undated) DEVICE — DRAPE,CHEST,FENES,15X10,STERIL: Brand: MEDLINE

## (undated) DEVICE — INTENDED USED TO PROTECT, TAG AND HELP LOCATED SUTURES DURING SURGERY: Brand: STERION®SUTURE AID BOOTIES

## (undated) DEVICE — PACK,BASIC,SIRUS,V: Brand: MEDLINE

## (undated) DEVICE — SOL INJ SOD CL 0.9% 500ML BG --

## (undated) DEVICE — HANDLE LT SNAP ON ULT DURABLE LENS FOR TRUMPF ALC DISPOSABLE

## (undated) DEVICE — TRAP SURG QUAD PARABOLA SLOT DSGN SFTY SCRN TRAPEASE

## (undated) DEVICE — CURVED, LARGE JAW, OPEN SEALER/DIVIDER NANO-COATED: Brand: LIGASURE IMPACT

## (undated) DEVICE — PENCIL SMK EVAC 10 FT BLADE ELECTRD ROCKER FOR TELSCP

## (undated) DEVICE — SURGICAL PROCEDURE PACK TISS 3X5 IN ABSORBABLE SEPRAFILM

## (undated) DEVICE — SUTURE VCRL SZ 2-0 L27IN ABSRB UD L26MM SH 1/2 CIR J417H

## (undated) DEVICE — SUTURE VCRL SZ 3-0 L27IN ABSRB UD L26MM SH 1/2 CIR J416H

## (undated) DEVICE — PAD,SANITARY,11 IN,MAXI,N-STRL,IND WRAP: Brand: MEDLINE

## (undated) DEVICE — BLADE ASSEMB CLP HAIR FINE --

## (undated) DEVICE — TOTAL TRAY, DB, 100% SILI FOLEY, 16FR 10: Brand: MEDLINE

## (undated) DEVICE — SUTURE VCRL SZ 2-0 L36IN ABSRB UD L36MM CT-1 1/2 CIR J945H

## (undated) DEVICE — GARMENT,MEDLINE,DVT,INT,CALF,MED, GEN2: Brand: MEDLINE

## (undated) DEVICE — Device: Brand: SINGLE USE INJECTOR NM600/610

## (undated) DEVICE — SUTURE MCRYL SZ 4-0 L27IN ABSRB UD L19MM PS-2 1/2 CIR PRIM Y426H

## (undated) DEVICE — COVER LT HNDL PLAS RIG 1 PER PK

## (undated) DEVICE — PREP SKN CHLRAPRP APL 26ML STR --

## (undated) DEVICE — GLOVE SURG SZ 75 L1212IN FNGR THK138MIL BRN LTX FREE

## (undated) DEVICE — SUTURE PROL SZ 2-0 L36IN NONABSORBABLE BLU SH L26MM 1/2 CIR 8523H

## (undated) DEVICE — STAIN INDIA INK IN NACL 10ML --

## (undated) DEVICE — POWDER HEMOSTAT GEL 3.0GR -- SURGICEL

## (undated) DEVICE — DERMABOND SKIN ADH 0.7ML -- DERMABOND ADVANCED 12/BX

## (undated) DEVICE — HYPODERMIC SAFETY NEEDLE: Brand: MAGELLAN

## (undated) DEVICE — SUTURE PDS II SZ 1 L96IN ABSRB VLT TP-1 L65MM 1/2 CIR Z880G

## (undated) DEVICE — SUT SLK 2-0SH 30IN BLK --

## (undated) DEVICE — SOLUTION IRRIG 1000ML STRL H2O USP PLAS POUR BTL

## (undated) DEVICE — GLOVE SURG SZ 6 L12IN FNGR THK79MIL GRN LTX FREE

## (undated) DEVICE — BASIN ST MAJOR-NO CAUTERY: Brand: MEDLINE INDUSTRIES, INC.

## (undated) DEVICE — TOWEL SURG W17XL27IN STD BLU COT NONFENESTRATED PREWASHED

## (undated) DEVICE — TUBING HYDR IRR --

## (undated) DEVICE — PREMIUM WET SKIN PREP TRAY: Brand: MEDLINE INDUSTRIES, INC.

## (undated) DEVICE — SURGICAL PROCEDURE PACK GYN ONCOLOGY CUST ST MARYS LF

## (undated) DEVICE — LEGGINGS, PAIR, 31X48, STERILE: Brand: MEDLINE

## (undated) DEVICE — GLOVE SURG SZ 8 L12IN FNGR THK94MIL STD WHT LTX FREE

## (undated) DEVICE — SHEET,DRAPE,UNDERBUTTOCK,GRAD POUCH,PORT: Brand: MEDLINE

## (undated) DEVICE — EXACTO COLD SNARE